# Patient Record
Sex: FEMALE | Race: BLACK OR AFRICAN AMERICAN | Employment: FULL TIME | URBAN - METROPOLITAN AREA
[De-identification: names, ages, dates, MRNs, and addresses within clinical notes are randomized per-mention and may not be internally consistent; named-entity substitution may affect disease eponyms.]

---

## 2017-12-13 ENCOUNTER — HOSPITAL ENCOUNTER (OUTPATIENT)
Dept: PHYSICAL THERAPY | Age: 42
Discharge: HOME | End: 2017-12-31
Admitting: NEUROLOGICAL SURGERY

## 2017-12-14 ENCOUNTER — HOSPITAL ENCOUNTER (OUTPATIENT)
Dept: NEUROLOGY | Age: 42
Discharge: HOME | End: 2017-12-14
Attending: EMERGENCY MEDICINE | Admitting: EMERGENCY MEDICINE

## 2017-12-14 DIAGNOSIS — R20.2 PARESTHESIA OF SKIN: ICD-10-CM

## 2017-12-21 ENCOUNTER — HOSPITAL ENCOUNTER (OUTPATIENT)
Dept: NEUROLOGY | Age: 42
Discharge: HOME | End: 2017-12-21
Attending: EMERGENCY MEDICINE | Admitting: EMERGENCY MEDICINE

## 2017-12-21 DIAGNOSIS — R20.2 PARESTHESIA OF SKIN: ICD-10-CM
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
## 2018-01-01 ENCOUNTER — HOSPITAL ENCOUNTER (OUTPATIENT)
Dept: OTHER | Age: 43
Discharge: HOME | End: 2018-01-01
Attending: NEUROLOGICAL SURGERY | Admitting: NEUROLOGICAL SURGERY

## 2019-09-26 ENCOUNTER — HOSPITAL ENCOUNTER (EMERGENCY)
Age: 44
Discharge: HOME | End: 2019-09-26
Payer: COMMERCIAL

## 2019-09-26 VITALS
SYSTOLIC BLOOD PRESSURE: 173 MMHG | BODY MASS INDEX: 31.56 KG/M2 | OXYGEN SATURATION: 99 % | HEART RATE: 70 BPM | DIASTOLIC BLOOD PRESSURE: 117 MMHG | RESPIRATION RATE: 16 BRPM | HEIGHT: 70 IN | WEIGHT: 220.46 LBS | TEMPERATURE: 97.4 F

## 2019-09-26 DIAGNOSIS — Z02.83 ENCOUNTER FOR DRUG SCREENING: ICD-10-CM

## 2019-09-26 DIAGNOSIS — Z02.83 ENCOUNTER FOR BLOOD-ALCOHOL TEST: Primary | ICD-10-CM

## 2019-09-26 LAB
AMPHETAMINE SCREEN, URINE: NORMAL
BARBITURATE SCREEN URINE: NORMAL
BENZODIAZEPINE SCREEN, URINE: NORMAL
CANNABINOID SCREEN URINE: NORMAL
COCAINE METABOLITE SCREEN URINE: NORMAL
ETHANOL: NORMAL MG/DL (ref 0–0.08)
Lab: NORMAL
METHADONE SCREEN, URINE: NORMAL
OPIATE SCREEN URINE: NORMAL
OXYCODONE URINE: NORMAL
PH UA: 5
PHENCYCLIDINE SCREEN URINE: NORMAL
PROPOXYPHENE SCREEN: NORMAL

## 2019-09-26 PROCEDURE — 80307 DRUG TEST PRSMV CHEM ANLYZR: CPT

## 2019-09-26 PROCEDURE — G0480 DRUG TEST DEF 1-7 CLASSES: HCPCS

## 2019-09-26 PROCEDURE — 99282 EMERGENCY DEPT VISIT SF MDM: CPT

## 2019-09-26 ASSESSMENT — PAIN SCALES - GENERAL: PAINLEVEL_OUTOF10: 0

## 2020-12-27 ENCOUNTER — HOSPITAL ENCOUNTER (EMERGENCY)
Age: 45
Discharge: HOME | DRG: 951 | End: 2020-12-28
Attending: EMERGENCY MEDICINE
Payer: COMMERCIAL

## 2020-12-27 DIAGNOSIS — F10.10 ETOH ABUSE: Primary | ICD-10-CM

## 2020-12-27 PROCEDURE — 99284 EMERGENCY DEPT VISIT MOD MDM: CPT

## 2020-12-27 RX ORDER — BUPRENORPHINE HYDROCHLORIDE AND NALOXONE HYDROCHLORIDE DIHYDRATE 8; 2 MG/1; MG/1
1 TABLET SUBLINGUAL DAILY
Status: ON HOLD | COMMUNITY
Start: 2020-10-12 | End: 2021-01-07 | Stop reason: HOSPADM

## 2020-12-27 RX ORDER — GABAPENTIN 300 MG/1
CAPSULE ORAL
COMMUNITY
Start: 2020-12-16

## 2020-12-27 RX ORDER — CLONIDINE HYDROCHLORIDE 0.1 MG/1
0.1 TABLET ORAL
Status: ON HOLD | COMMUNITY
End: 2020-12-28 | Stop reason: CLARIF

## 2020-12-27 RX ORDER — HYDROCHLOROTHIAZIDE 12.5 MG/1
TABLET ORAL
Status: ON HOLD | COMMUNITY
Start: 2020-09-22 | End: 2021-01-07 | Stop reason: HOSPADM

## 2020-12-27 ASSESSMENT — PAIN DESCRIPTION - FREQUENCY: FREQUENCY: CONTINUOUS

## 2020-12-27 ASSESSMENT — PAIN DESCRIPTION - DESCRIPTORS: DESCRIPTORS: SHARP;SHOOTING;ACHING

## 2020-12-27 ASSESSMENT — PAIN DESCRIPTION - PAIN TYPE: TYPE: ACUTE PAIN

## 2020-12-27 ASSESSMENT — PAIN SCALES - GENERAL: PAINLEVEL_OUTOF10: 7

## 2020-12-27 ASSESSMENT — PAIN - FUNCTIONAL ASSESSMENT: PAIN_FUNCTIONAL_ASSESSMENT: PREVENTS OR INTERFERES SOME ACTIVE ACTIVITIES AND ADLS

## 2020-12-28 ENCOUNTER — APPOINTMENT (OUTPATIENT)
Dept: CT IMAGING | Age: 45
DRG: 951 | End: 2020-12-28
Payer: COMMERCIAL

## 2020-12-28 ENCOUNTER — APPOINTMENT (OUTPATIENT)
Dept: GENERAL RADIOLOGY | Age: 45
DRG: 951 | End: 2020-12-28
Payer: COMMERCIAL

## 2020-12-28 ENCOUNTER — HOSPITAL ENCOUNTER (INPATIENT)
Age: 45
LOS: 10 days | Discharge: HOME | DRG: 951 | End: 2021-01-07
Attending: STUDENT IN AN ORGANIZED HEALTH CARE EDUCATION/TRAINING PROGRAM | Admitting: HOSPITALIST
Payer: COMMERCIAL

## 2020-12-28 VITALS
BODY MASS INDEX: 34.1 KG/M2 | RESPIRATION RATE: 18 BRPM | TEMPERATURE: 98 F | SYSTOLIC BLOOD PRESSURE: 122 MMHG | WEIGHT: 237.66 LBS | DIASTOLIC BLOOD PRESSURE: 71 MMHG | HEART RATE: 71 BPM | OXYGEN SATURATION: 98 %

## 2020-12-28 DIAGNOSIS — M62.82 NON-TRAUMATIC RHABDOMYOLYSIS: ICD-10-CM

## 2020-12-28 DIAGNOSIS — R45.851 SUICIDAL IDEATION: Primary | ICD-10-CM

## 2020-12-28 DIAGNOSIS — K80.20 SYMPTOMATIC CHOLELITHIASIS: ICD-10-CM

## 2020-12-28 DIAGNOSIS — F19.10 DRUG ABUSE (HCC): ICD-10-CM

## 2020-12-28 PROBLEM — F10.939 ALCOHOL WITHDRAWAL SYNDROME WITH COMPLICATION (HCC): Status: ACTIVE | Noted: 2020-12-28

## 2020-12-28 LAB
A/G RATIO: 1.1 (ref 1.1–2.2)
ACETAMINOPHEN LEVEL: <5 UG/ML (ref 10–30)
ALBUMIN SERPL-MCNC: 3.8 G/DL (ref 3.4–5)
ALP BLD-CCNC: 96 U/L (ref 40–129)
ALT SERPL-CCNC: 11 U/L (ref 10–40)
AMPHETAMINE SCREEN, URINE: ABNORMAL
ANION GAP SERPL CALCULATED.3IONS-SCNC: 15 MMOL/L (ref 3–16)
AST SERPL-CCNC: 23 U/L (ref 15–37)
BACTERIA: ABNORMAL /HPF
BARBITURATE SCREEN URINE: ABNORMAL
BASOPHILS ABSOLUTE: 0 K/UL (ref 0–0.2)
BASOPHILS RELATIVE PERCENT: 0.2 %
BENZODIAZEPINE SCREEN, URINE: ABNORMAL
BILIRUB SERPL-MCNC: 0.6 MG/DL (ref 0–1)
BILIRUBIN URINE: NEGATIVE
BLOOD, URINE: ABNORMAL
BUN BLDV-MCNC: 14 MG/DL (ref 7–20)
CALCIUM SERPL-MCNC: 9.1 MG/DL (ref 8.3–10.6)
CANNABINOID SCREEN URINE: ABNORMAL
CHLORIDE BLD-SCNC: 99 MMOL/L (ref 99–110)
CLARITY: CLEAR
CO2: 21 MMOL/L (ref 21–32)
COCAINE METABOLITE SCREEN URINE: POSITIVE
COLOR: YELLOW
CREAT SERPL-MCNC: 1 MG/DL (ref 0.6–1.1)
EOSINOPHILS ABSOLUTE: 0 K/UL (ref 0–0.6)
EOSINOPHILS RELATIVE PERCENT: 0.4 %
EPITHELIAL CELLS, UA: 1 /HPF (ref 0–5)
ETHANOL: 12 MG/DL (ref 0–0.08)
GFR AFRICAN AMERICAN: >60
GFR NON-AFRICAN AMERICAN: 59
GLOBULIN: 3.6 G/DL
GLUCOSE BLD-MCNC: 173 MG/DL (ref 70–99)
GLUCOSE URINE: NEGATIVE MG/DL
HCG(URINE) PREGNANCY TEST: NEGATIVE
HCT VFR BLD CALC: 41.2 % (ref 36–48)
HEMOGLOBIN: 14 G/DL (ref 12–16)
HYALINE CASTS: 0 /LPF (ref 0–8)
KETONES, URINE: NEGATIVE MG/DL
LACTIC ACID: 1.8 MMOL/L (ref 0.4–2)
LEUKOCYTE ESTERASE, URINE: ABNORMAL
LYMPHOCYTES ABSOLUTE: 1.4 K/UL (ref 1–5.1)
LYMPHOCYTES RELATIVE PERCENT: 12.8 %
Lab: ABNORMAL
MAGNESIUM: 1.2 MG/DL (ref 1.8–2.4)
MCH RBC QN AUTO: 31.5 PG (ref 26–34)
MCHC RBC AUTO-ENTMCNC: 33.9 G/DL (ref 31–36)
MCV RBC AUTO: 93.1 FL (ref 80–100)
METHADONE SCREEN, URINE: ABNORMAL
MICROSCOPIC EXAMINATION: YES
MONOCYTES ABSOLUTE: 1.4 K/UL (ref 0–1.3)
MONOCYTES RELATIVE PERCENT: 12.7 %
NEUTROPHILS ABSOLUTE: 8.1 K/UL (ref 1.7–7.7)
NEUTROPHILS RELATIVE PERCENT: 73.9 %
NITRITE, URINE: POSITIVE
OPIATE SCREEN URINE: ABNORMAL
OXYCODONE URINE: ABNORMAL
PDW BLD-RTO: 14.7 % (ref 12.4–15.4)
PH UA: 6
PH UA: 6 (ref 5–8)
PHENCYCLIDINE SCREEN URINE: ABNORMAL
PLATELET # BLD: 132 K/UL (ref 135–450)
PMV BLD AUTO: 9.9 FL (ref 5–10.5)
POTASSIUM REFLEX MAGNESIUM: 3.4 MMOL/L (ref 3.5–5.1)
PROCALCITONIN: 0.25 NG/ML (ref 0–0.15)
PROPOXYPHENE SCREEN: ABNORMAL
PROTEIN UA: 30 MG/DL
RBC # BLD: 4.43 M/UL (ref 4–5.2)
RBC UA: 4 /HPF (ref 0–4)
SALICYLATE, SERUM: <0.3 MG/DL (ref 15–30)
SARS-COV-2, NAAT: NOT DETECTED
SODIUM BLD-SCNC: 135 MMOL/L (ref 136–145)
SPECIFIC GRAVITY UA: >=1.03 (ref 1–1.03)
TOTAL CK: 613 U/L (ref 26–192)
TOTAL CK: 629 U/L (ref 26–192)
TOTAL PROTEIN: 7.4 G/DL (ref 6.4–8.2)
URINE TYPE: ABNORMAL
UROBILINOGEN, URINE: 1 E.U./DL
WBC # BLD: 11 K/UL (ref 4–11)
WBC UA: 343 /HPF (ref 0–5)

## 2020-12-28 PROCEDURE — 2580000003 HC RX 258: Performed by: HOSPITALIST

## 2020-12-28 PROCEDURE — 85025 COMPLETE CBC W/AUTO DIFF WBC: CPT

## 2020-12-28 PROCEDURE — 6370000000 HC RX 637 (ALT 250 FOR IP): Performed by: STUDENT IN AN ORGANIZED HEALTH CARE EDUCATION/TRAINING PROGRAM

## 2020-12-28 PROCEDURE — 99285 EMERGENCY DEPT VISIT HI MDM: CPT

## 2020-12-28 PROCEDURE — 84145 PROCALCITONIN (PCT): CPT

## 2020-12-28 PROCEDURE — 6360000004 HC RX CONTRAST MEDICATION: Performed by: STUDENT IN AN ORGANIZED HEALTH CARE EDUCATION/TRAINING PROGRAM

## 2020-12-28 PROCEDURE — 6360000002 HC RX W HCPCS: Performed by: HOSPITALIST

## 2020-12-28 PROCEDURE — 80307 DRUG TEST PRSMV CHEM ANLYZR: CPT

## 2020-12-28 PROCEDURE — 80053 COMPREHEN METABOLIC PANEL: CPT

## 2020-12-28 PROCEDURE — 96365 THER/PROPH/DIAG IV INF INIT: CPT

## 2020-12-28 PROCEDURE — 1200000000 HC SEMI PRIVATE

## 2020-12-28 PROCEDURE — 84703 CHORIONIC GONADOTROPIN ASSAY: CPT

## 2020-12-28 PROCEDURE — 81001 URINALYSIS AUTO W/SCOPE: CPT

## 2020-12-28 PROCEDURE — 6360000002 HC RX W HCPCS: Performed by: STUDENT IN AN ORGANIZED HEALTH CARE EDUCATION/TRAINING PROGRAM

## 2020-12-28 PROCEDURE — 82550 ASSAY OF CK (CPK): CPT

## 2020-12-28 PROCEDURE — G0480 DRUG TEST DEF 1-7 CLASSES: HCPCS

## 2020-12-28 PROCEDURE — 2580000003 HC RX 258: Performed by: STUDENT IN AN ORGANIZED HEALTH CARE EDUCATION/TRAINING PROGRAM

## 2020-12-28 PROCEDURE — 71045 X-RAY EXAM CHEST 1 VIEW: CPT

## 2020-12-28 PROCEDURE — 6370000000 HC RX 637 (ALT 250 FOR IP): Performed by: HOSPITALIST

## 2020-12-28 PROCEDURE — U0002 COVID-19 LAB TEST NON-CDC: HCPCS

## 2020-12-28 PROCEDURE — 83735 ASSAY OF MAGNESIUM: CPT

## 2020-12-28 PROCEDURE — 74177 CT ABD & PELVIS W/CONTRAST: CPT

## 2020-12-28 PROCEDURE — 83605 ASSAY OF LACTIC ACID: CPT

## 2020-12-28 RX ORDER — LORAZEPAM 1 MG/1
3 TABLET ORAL
Status: DISCONTINUED | OUTPATIENT
Start: 2020-12-28 | End: 2020-12-30

## 2020-12-28 RX ORDER — SPIRONOLACTONE 25 MG/1
50 TABLET ORAL DAILY
Status: DISCONTINUED | OUTPATIENT
Start: 2020-12-28 | End: 2021-01-07 | Stop reason: HOSPADM

## 2020-12-28 RX ORDER — SODIUM CHLORIDE 0.9 % (FLUSH) 0.9 %
10 SYRINGE (ML) INJECTION EVERY 12 HOURS SCHEDULED
Status: DISCONTINUED | OUTPATIENT
Start: 2020-12-28 | End: 2021-01-04 | Stop reason: SDUPTHER

## 2020-12-28 RX ORDER — POLYETHYLENE GLYCOL 3350 17 G/17G
17 POWDER, FOR SOLUTION ORAL DAILY PRN
Status: DISCONTINUED | OUTPATIENT
Start: 2020-12-28 | End: 2021-01-07 | Stop reason: HOSPADM

## 2020-12-28 RX ORDER — SERTRALINE HYDROCHLORIDE 100 MG/1
100 TABLET, FILM COATED ORAL DAILY
Status: DISCONTINUED | OUTPATIENT
Start: 2020-12-28 | End: 2020-12-29

## 2020-12-28 RX ORDER — GAUZE BANDAGE 2" X 2"
100 BANDAGE TOPICAL DAILY
Status: DISCONTINUED | OUTPATIENT
Start: 2020-12-28 | End: 2021-01-07 | Stop reason: HOSPADM

## 2020-12-28 RX ORDER — 0.9 % SODIUM CHLORIDE 0.9 %
1000 INTRAVENOUS SOLUTION INTRAVENOUS ONCE
Status: COMPLETED | OUTPATIENT
Start: 2020-12-28 | End: 2020-12-28

## 2020-12-28 RX ORDER — GABAPENTIN 300 MG/1
300 CAPSULE ORAL 2 TIMES DAILY
Status: DISCONTINUED | OUTPATIENT
Start: 2020-12-28 | End: 2021-01-07 | Stop reason: HOSPADM

## 2020-12-28 RX ORDER — LORAZEPAM 2 MG/ML
4 INJECTION INTRAMUSCULAR
Status: DISCONTINUED | OUTPATIENT
Start: 2020-12-28 | End: 2020-12-30

## 2020-12-28 RX ORDER — LORAZEPAM 2 MG/ML
1 INJECTION INTRAMUSCULAR
Status: DISCONTINUED | OUTPATIENT
Start: 2020-12-28 | End: 2020-12-30

## 2020-12-28 RX ORDER — PANTOPRAZOLE SODIUM 40 MG/1
40 TABLET, DELAYED RELEASE ORAL
Status: DISCONTINUED | OUTPATIENT
Start: 2020-12-29 | End: 2021-01-02

## 2020-12-28 RX ORDER — LORAZEPAM 2 MG/ML
2 INJECTION INTRAMUSCULAR
Status: DISCONTINUED | OUTPATIENT
Start: 2020-12-28 | End: 2020-12-30

## 2020-12-28 RX ORDER — LORAZEPAM 1 MG/1
1 TABLET ORAL
Status: DISCONTINUED | OUTPATIENT
Start: 2020-12-28 | End: 2020-12-30

## 2020-12-28 RX ORDER — ATENOLOL 50 MG/1
100 TABLET ORAL DAILY
Status: DISCONTINUED | OUTPATIENT
Start: 2020-12-28 | End: 2021-01-07 | Stop reason: HOSPADM

## 2020-12-28 RX ORDER — PROMETHAZINE HYDROCHLORIDE 25 MG/1
12.5 TABLET ORAL EVERY 6 HOURS PRN
Status: DISCONTINUED | OUTPATIENT
Start: 2020-12-28 | End: 2021-01-07 | Stop reason: HOSPADM

## 2020-12-28 RX ORDER — LORAZEPAM 1 MG/1
2 TABLET ORAL
Status: DISCONTINUED | OUTPATIENT
Start: 2020-12-28 | End: 2020-12-30

## 2020-12-28 RX ORDER — LORAZEPAM 1 MG/1
4 TABLET ORAL
Status: DISCONTINUED | OUTPATIENT
Start: 2020-12-28 | End: 2020-12-30

## 2020-12-28 RX ORDER — SODIUM CHLORIDE 0.9 % (FLUSH) 0.9 %
10 SYRINGE (ML) INJECTION PRN
Status: DISCONTINUED | OUTPATIENT
Start: 2020-12-28 | End: 2021-01-04 | Stop reason: SDUPTHER

## 2020-12-28 RX ORDER — ACETAMINOPHEN 325 MG/1
650 TABLET ORAL ONCE
Status: COMPLETED | OUTPATIENT
Start: 2020-12-28 | End: 2020-12-28

## 2020-12-28 RX ORDER — MULTIVITAMIN WITH IRON
1 TABLET ORAL DAILY
Status: DISCONTINUED | OUTPATIENT
Start: 2020-12-28 | End: 2021-01-07 | Stop reason: HOSPADM

## 2020-12-28 RX ORDER — FOLIC ACID 1 MG/1
1 TABLET ORAL DAILY
Status: DISCONTINUED | OUTPATIENT
Start: 2020-12-28 | End: 2021-01-07 | Stop reason: HOSPADM

## 2020-12-28 RX ORDER — LORAZEPAM 2 MG/ML
3 INJECTION INTRAMUSCULAR
Status: DISCONTINUED | OUTPATIENT
Start: 2020-12-28 | End: 2020-12-30

## 2020-12-28 RX ORDER — ACETAMINOPHEN 325 MG/1
650 TABLET ORAL EVERY 6 HOURS PRN
Status: DISCONTINUED | OUTPATIENT
Start: 2020-12-28 | End: 2021-01-07 | Stop reason: HOSPADM

## 2020-12-28 RX ORDER — ONDANSETRON 2 MG/ML
4 INJECTION INTRAMUSCULAR; INTRAVENOUS EVERY 6 HOURS PRN
Status: DISCONTINUED | OUTPATIENT
Start: 2020-12-28 | End: 2021-01-07 | Stop reason: HOSPADM

## 2020-12-28 RX ADMIN — SPIRONOLACTONE 50 MG: 25 TABLET ORAL at 14:40

## 2020-12-28 RX ADMIN — ACETAMINOPHEN 650 MG: 325 TABLET ORAL at 08:20

## 2020-12-28 RX ADMIN — SODIUM CHLORIDE, PRESERVATIVE FREE 10 ML: 5 INJECTION INTRAVENOUS at 22:46

## 2020-12-28 RX ADMIN — ACETAMINOPHEN 650 MG: 325 TABLET ORAL at 14:41

## 2020-12-28 RX ADMIN — GABAPENTIN 300 MG: 300 CAPSULE ORAL at 14:41

## 2020-12-28 RX ADMIN — ATENOLOL 100 MG: 50 TABLET ORAL at 14:40

## 2020-12-28 RX ADMIN — THIAMINE HCL TAB 100 MG 100 MG: 100 TAB at 14:41

## 2020-12-28 RX ADMIN — SODIUM CHLORIDE 1000 ML: 9 INJECTION, SOLUTION INTRAVENOUS at 09:00

## 2020-12-28 RX ADMIN — LORAZEPAM 1 MG: 2 INJECTION INTRAMUSCULAR; INTRAVENOUS at 22:44

## 2020-12-28 RX ADMIN — CEFTRIAXONE 1 G: 1 INJECTION, POWDER, FOR SOLUTION INTRAMUSCULAR; INTRAVENOUS at 10:59

## 2020-12-28 RX ADMIN — SODIUM CHLORIDE 1000 ML: 9 INJECTION, SOLUTION INTRAVENOUS at 10:58

## 2020-12-28 RX ADMIN — FOLIC ACID 1 MG: 1 TABLET ORAL at 14:41

## 2020-12-28 RX ADMIN — GABAPENTIN 300 MG: 300 CAPSULE ORAL at 20:29

## 2020-12-28 RX ADMIN — IOPAMIDOL 75 ML: 755 INJECTION, SOLUTION INTRAVENOUS at 10:39

## 2020-12-28 RX ADMIN — ACETAMINOPHEN 650 MG: 325 TABLET ORAL at 20:29

## 2020-12-28 RX ADMIN — LORAZEPAM 4 MG: 2 INJECTION INTRAMUSCULAR; INTRAVENOUS at 14:51

## 2020-12-28 ASSESSMENT — PAIN DESCRIPTION - PAIN TYPE
TYPE: ACUTE PAIN
TYPE: ACUTE PAIN

## 2020-12-28 ASSESSMENT — PAIN SCALES - GENERAL
PAINLEVEL_OUTOF10: 0
PAINLEVEL_OUTOF10: 4
PAINLEVEL_OUTOF10: 0
PAINLEVEL_OUTOF10: 8
PAINLEVEL_OUTOF10: 0

## 2020-12-28 ASSESSMENT — PAIN DESCRIPTION - ONSET: ONSET: GRADUAL

## 2020-12-28 ASSESSMENT — PAIN DESCRIPTION - LOCATION
LOCATION: HEAD

## 2020-12-28 ASSESSMENT — PAIN DESCRIPTION - FREQUENCY: FREQUENCY: CONTINUOUS

## 2020-12-28 ASSESSMENT — PAIN DESCRIPTION - DESCRIPTORS
DESCRIPTORS: ACHING
DESCRIPTORS: ACHING

## 2020-12-28 NOTE — ED NOTES
RN at bedside pt is AOX4 and walking around room with a steady gait. Pt updated on discharge plan of care.       Jack Abdullahi RN  12/28/20 9147

## 2020-12-28 NOTE — ED NOTES
Pt to CT with .       Keyur Danielson  12/28/20 8689 continue on zosyn  f/u cultures  pct >1 cont emp antibx and dc planning

## 2020-12-28 NOTE — ED PROVIDER NOTES
connections     Talks on phone: None     Gets together: None     Attends [employer] service: None     Active member of club or organization: None     Attends meetings of clubs or organizations: None     Relationship status: None    Intimate partner violence     Fear of current or ex partner: None     Emotionally abused: None     Physically abused: None     Forced sexual activity: None   Other Topics Concern    None   Social History Narrative    None        Review of Systems   10 total systems reviewed and found to be negative unless otherwise noted in HPI     Physical Exam   BP (!) 152/93   Pulse 66   Temp 99.2 °F (37.3 °C) (Oral)   Resp 18   Wt 239 lb 6.7 oz (108.6 kg)   SpO2 98%   BMI 34.35 kg/m²      CONSTITUTIONAL: Well appearing, in no acute distress   HEAD: atraumatic, normocephalic   EYES: PERRL, No injection, discharge or scleral icterus. ENT: Moist mucous membranes. NECK: Normal ROM, NO LAD   CARDIOVASCULAR: Regular rate and rhythm. No murmurs or gallop. PULMONARY/CHEST: Airway patent. No retractions. Breath sounds clear with good air entry bilaterally. ABDOMEN: Soft, Non-distended and non-tender, without guarding or rebound. SKIN: Acyanotic, warm, dry   MUSCULOSKELETAL: No swelling, tenderness or deformity   NEUROLOGICAL: Awake and oriented x 3. Pulses intact. Grossly nonfocal   Nursing note and vitals reviewed.      ED Course & Medical Decision Making   Medications   acetaminophen (TYLENOL) tablet 650 mg (650 mg Oral Given 12/29/20 1200)   atenolol (TENORMIN) tablet 100 mg (100 mg Oral Given 12/29/20 0838)   gabapentin (NEURONTIN) capsule 300 mg (300 mg Oral Given 12/29/20 2100)   pantoprazole (PROTONIX) tablet 40 mg (40 mg Oral Given 12/29/20 0512)   spironolactone (ALDACTONE) tablet 50 mg (50 mg Oral Given 12/29/20 0838)   sodium chloride flush 0.9 % injection 10 mL (10 mLs Intravenous Not Given 12/29/20 2114)   sodium chloride flush 0.9 % injection 10 mL (10 mLs Intravenous Given 12/28/20 1148)   iopamidol (ISOVUE-370) 76 % injection 75 mL (75 mLs Intravenous Given 12/28/20 1039)   acetaminophen (TYLENOL) tablet 650 mg (650 mg Oral Given 12/29/20 0114)   0.9 % sodium chloride infusion ( Intravenous New Bag 12/29/20 0113)   magnesium sulfate 4 g in 100 mL IVPB premix (0 g Intravenous Stopped 12/29/20 1844)      Labs Reviewed   CBC WITH AUTO DIFFERENTIAL - Abnormal; Notable for the following components:       Result Value    Platelets 513 (*)     Neutrophils Absolute 8.1 (*)     Monocytes Absolute 1.4 (*)     All other components within normal limits    Narrative:     Performed at:  [address] [facility] [employer]   Phone [phone]   COMPREHENSIVE METABOLIC PANEL W/ REFLEX TO MG FOR LOW K - Abnormal; Notable for the following components:    Sodium 135 (*)     Potassium reflex Magnesium 3.4 (*)     Glucose 173 (*)     GFR Non- 59 (*)     All other components within normal limits    Narrative:     Performed at:  [address] [facility] [employer]   Phone [phone]   [address] - Abnormal; Notable for the following components:    Cocaine Metabolite Screen, Urine POSITIVE (*)     All other components within normal limits    Narrative:     Performed at:  [address] [employer]   Phone [phone]   URINALYSIS - Abnormal; Notable for the following components:    Blood, Urine MODERATE (*)     Protein, UA 30 (*)     Nitrite, Urine POSITIVE (*)     Leukocyte Esterase, Urine SMALL (*)     All other components within normal limits    Narrative:     Performed at:  [address] [employer]   Phone [phone]   ACETAMINOPHEN LEVEL - Abnormal; Notable for the following components:    Acetaminophen Level <5 (*)     All other components components:    Sodium 133 (*)     Potassium reflex Magnesium 3.4 (*)     Glucose 140 (*)     GFR Non-African American 53 (*)     Calcium 7.8 (*)     Total Protein 6.2 (*)     Alb 3.1 (*)     Albumin/Globulin Ratio 1.0 (*)     All other components within normal limits    Narrative:     Performed at:  [facility]  [address] [facility] [employer] [address]   Phone [phone]   CBC WITH AUTO DIFFERENTIAL - Abnormal; Notable for the following components:    Platelets 155 (*)     Lymphocytes Absolute 0.9 (*)     All other components within normal limits    Narrative:     Performed at:  [address] [employer] [address]   Phone [phone]   PROTIME-INR - Abnormal; Notable for the following components:    Protime 14.4 (*)     INR 1.24 (*)     All other components within normal limits    Narrative:     Performed at:  [address] [employer] [address]   Phone [phone]   MAGNESIUM - Abnormal; Notable for the following components:    Magnesium 1.30 (*)     All other components within normal limits    Narrative:     Performed at:  [facility]  [address] [facility] [employer] [address]   Phone [phone]   ETHANOL    Narrative:     Performed at:  [facility]  [address] [place] [address] [employer] [address]   Phone [phone]   PREGNANCY, URINE    Narrative:     Performed at:  [facility]  [address] [place] [address] [employer] [address]   Phone [phone]    Narrative:     Performed at:  [facility]  [address] [place] [address] [employer] [address]   Phone [phone]   LACTIC ACID, PLASMA    Narrative:     Performed at:  [address] [employer] [address]   Phone [phone]   BASIC METABOLIC PANEL   MAGNESIUM      CT ABDOMEN PELVIS W IV CONTRAST Additional Contrast? None   Final Result   1. Acute left pyelonephritis; correlate with urinalysis. XR CHEST PORTABLE   Final Result   No active cardiopulmonary disease            PROCEDURES:   Procedures    ASSESSMENT AND PLAN:  Joseline Haynes is a 54 y.o. female with history of hypertension, gastric ulcers presenting this morning complaining of depression. States that she does not want to give her borders anybody and just wanted to run her car of the zion. On exam patient was very tearful and depressed. On her presentation she was evaluated for psychiatry admission. Labs were obtained including UDS and CK because she has some tremors. So far labs positive for cocaine and elevated CK. UA also showed UTI which patient has been given a dose of Rocephin. The rest of the labs are unremarkable. Given the findings of CK concerning for rhabdo it would be difficult to medically cleared patient for psychiatry admission. She remained suicidal with elevated CK. I am recommending she be admitted and transferred to psych when she is medically cleared. Specialist consulted patient admitted to their service for further evaluation and treatment. ClINICAL IMPRESSION:  1. Suicidal ideation    2. Drug abuse ([address]    3. Non-traumatic rhabdomyolysis        DISPOSITION Admitted 12/28/2020 12:43:37 PM   -Findings and recommendations explained to patient. She expressed understanding and agreed with the plan.   Soco Newberry MD (electronically signed)  12/29/2020  _________________________________________________________________________________________  Amount and/or Complexity of Data Reviewed:  Clinical lab tests: ordered and reviewed   Tests in the radiology section of CPT®: ordered and reviewed   Tests in the medicine section of CPT®: ordered and reviewed   Decide to obtain previous medical records or to obtain history from someone other than the patient: no  Obtain history from someone other than the patient: no  Review and summarize past medical records:yes  I looked up the patient in our electronic medical record:yes  Discuss the patient with other providers:yes  Independent visualization of images, tracings, or specimens:yes  Risk of Complications, Morbidity, and/or Mortality:Moderate  Presenting problems: moderate Diagnostic procedures: moderate yes Management options: moderate     _________________________________________________________________________________________  This record is transcribed utilizing voice recognition technology. There are inherent limitations in this technology. In addition, there may be limitations in editing of this report. If there are any discrepancies, please contact me directly.                Lauryn Narvaez MD  12/29/20 7969

## 2020-12-28 NOTE — ED NOTES
at bedside. Room made safe by removing all hazards. Bottle of alcohol found in pt's coat and given to care-giving RN.      Ayo Danielson  12/28/20 [place]  12/28/20 3994

## 2020-12-28 NOTE — ED NOTES
[facility] to be transported to Room 3254 via stretcher by ED tech. Patient transported with bedside cardiac monitor and with IV medications infusing. IV site clean, dry, and intact. MEWS score and pain assessed as 8/10 and documented. Updated patient on plan of care.  Awaiting call back about sitter     Azul Foote RN  12/28/20 6788

## 2020-12-28 NOTE — ED NOTES
Pt came in to the ER very tearful. She states that she is going to drive into traffic she states that everyone will better without me. She states her last drink of alcohol was last night, her last cocaine use was 2 days ago, and fentanyl was last week. She reports she snorts both. Her states she w3ants both.      When charge nurse RN, walked pt to her room she wrapped the BP cord around her neck       Sanjuanita Erazo RN  12/28/20 2045

## 2020-12-28 NOTE — ED NOTES
Pt had a bottle of alcohol in her coat jacket. Security called .       Anjali Melara RN  12/28/20 6840

## 2020-12-28 NOTE — PROGRESS NOTES
4 Eyes Skin Assessment     NAME:  Idania Betancourt OF BIRTH:  1975  MEDICAL RECORD NUMBER:  [id number]    The patient is being assess for  Admission    I agree that 2 RN's have performed a thorough Head to Toe Skin Assessment on the patient. ALL assessment sites listed below have been assessed. Areas assessed by both nurses:    Head, Face, Ears, Shoulders, Back, Chest, Arms, Elbows, Hands, Sacrum. Buttock, Coccyx, Ischium and Legs. Feet and Heels        Does the Patient have a Wound?  No noted wound(s)       Moe Prevention initiated:  No   Wound Care Orders initiated:  NA    Pressure Injury (Stage 3,4, Unstageable, DTI, NWPT, and Complex wounds) if present place consult order under [de-identified] No    New and Established Ostomies if present place consult order under : NA      Nurse 1 eSignature: Electronically signed by Dino Dumont RN on 12/28/20 at 3:09 PM EST    **SHARE this note so that the co-signing nurse is able to place an eSignature**    Nurse 2 eSignature: Electronically signed by Rhiannon Solomon RN on 12/28/20 at 3:11 PM EST

## 2020-12-28 NOTE — ED NOTES
Care-giving RN finished with triage. PIV placed and blood drawn per RN.       Mike Danielson  12/28/20 5016

## 2020-12-28 NOTE — ED NOTES
RN at bedside pt will awake to name, but is very sleepy. Pt was able to answer triage questions approprietly but kept falling asleep.       Geovany Marinelli RN  12/28/20 3638

## 2020-12-28 NOTE — LETTER
[facility]  [address] [address]  Phone: [phone]             January 7, 2021    Patient: Yasemin Vicente   YOB: 1975   Date of Visit: 12/28/2020       To Whom It May Concern:    Mayda Ward was seen and treated in our facility  beginning 12/28/2020 until 1/7/2021.       Sincerely,       Anderson Mock RN         Signature:__________________________________
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
## 2020-12-28 NOTE — ED NOTES
RN at bedside pt given two warm blankets and updated on plan of care.       Carmen Downey RN  12/28/20 0782
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
## 2020-12-28 NOTE — ED NOTES
Pt states that she started with abd about 30  mins ago rates pain 8/10 RUQ     Devante Fuentes, HITESH  12/28/20 0218

## 2020-12-28 NOTE — ED PROVIDER NOTES
Triage Chief Complaint:   Alcohol Problem (pt states she drinks a bottle of vodka every day and wants help. states she drank a bottle before coming to the ed tonight ) and Abdominal Pain    [place]:  Delma Heard is a 54 y.o. female that presents requesting \"alcohol treatment\". The patient states she is a chronic alcoholic. She states she drank a bottle of vodka before coming to the ED tonight. Despite triage note stating she has abdominal pain she has no abdominal pain at this time. ROS:  At least 12 systems reviewed and otherwise acutely negative except as in the [address] Past Medical History:   Diagnosis Date    Gastric ulcer     Hypertension      Past Surgical History:   Procedure Laterality Date     SECTION      ENDOMETRIAL ABLATION      GASTRIC BYPASS SURGERY       No family history on file.   Social History     Socioeconomic History    Marital status: Single     Spouse name: Not on file    Number of children: Not on file    Years of education: Not on file    Highest education level: Not on file   Occupational History    Not on file   Social Needs    Financial resource strain: Not on file    Food insecurity     Worry: Not on file     Inability: Not on file    Transportation needs     Medical: Not on file     Non-medical: Not on file   Tobacco Use    Smoking status: Current Every Day Smoker     Packs/day: 0.50     Years: 20.00     Pack years: 10.00     Types: Cigarettes   Substance and Sexual Activity    Alcohol use: No     Comment: sober 120 days    Drug use: No    Sexual activity: Not on file   Lifestyle    Physical activity     Days per week: Not on file     Minutes per session: Not on file    Stress: Not on file   Relationships    Social connections     Talks on phone: Not on file     Gets together: Not on file     Attends Ep[employer]opalian service: Not on file     Active member of club or organization: Not on file     Attends meetings of clubs or organizations: Not on file intoxicated but cooperative  HEAD: Normocephalic. Atraumatic. EYES: EOM's grossly intact. Sclera anicteric. ENT: Mucous membranes are moist. Tolerates saliva. No trismus. NECK: Supple. No meningismus. Trachea midline. HEART: RRR. Radial pulses 2+. LUNGS: Respirations unlabored. CTAB  ABDOMEN: Soft. Non-tender. No guarding or rebound. EXTREMITIES: No acute deformities. SKIN: Warm and dry. NEUROLOGICAL: No gross facial drooping. Moves all 4 extremities spontaneously. PSYCHIATRIC: Normal mood. Clinically intoxicated but cooperative    I have reviewed and interpreted all of the currently available lab results from this visit (if applicable):  No results found for this visit on 12/27/20. Radiographs (if obtained):  [] The following radiograph was interpreted by myself in the absence of a radiologist:  [x] Radiologist's Report Reviewed:  n/a    EKG (if obtained): (All EKG's are interpreted by myself in the absence of a cardiologist)  Initial EKG on my interpretation shows n/a    MDM:  Differential diagnosis: Intoxication, chronic alcohol abuse, request for assistance with alcohol detox    I have told the patient that we do not offer inpatient alcohol detox here at [facility].      As the patient is clinically intoxicated I kept her here for observation for an extended period of time, over 7 hours, and she is now clinically sober. Will discharge with outpatient resources to help her address her alcohol abuse issue. I estimate there is LOW risk for SUICIDAL BEHAVIOR, HOMICIDAL BEHAVIOR, PSYCHOSIS, DANGEROUS OR VIOLENT BEHAVIOR, DISORIENTATION, OR MENTAL HEALTH CONDITION REQUIRING HOSPITALIZATION, thus I consider the discharge disposition reasonable. The patient is clinically sober upon discharge. Patient discharged clinically sober with no evidence of intoxication or active withdrawal    Old records reviewed. Labs and imaging reviewed and results discussed with patient. .        Patient was given scripts for the following medications. I counseled patient how to take these medications. New Prescriptions    No medications on file         CRITICAL CARE TIME   Total Critical Care time was 0 minutes, excluding separately reportable procedures. There was a high probability of clinically significant/life threatening deterioration in the patient's condition which required my urgent intervention.       Clinical Impression:  Chronic alcohol abuse  (Please note that portions of this note may have been completed with a voice recognition program. Efforts were made to edit the dictations but occasionally words are mis-transcribed.)    MD Marcela Lea MD  12/28/20 8815

## 2020-12-28 NOTE — H&P
Hospital Medicine History & Physical      PCP: Delia Booth MD    Date of Admission: 2020    Date of Service: Pt seen/examined on 2020 and Admitted to Inpatient with expected LOS greater than two midnights once medically cleared would transition to inpatient psychiatric unit    Chief Complaint: Depression with suicidal thoughts      History Of Present Illness:    54 y.o. female who presented to [facility] complaining of depression stating that she is going to drive herself into traffic according to the notes everyone would be better without her. When the nurse walked her to her room she wrapped the blood pressure cord around her neck. Stated her last drink of alcohol was last night last used cocaine 2 days ago and fentanyl last week. Reportedly snorts both substances and she had a bottle of alcohol in her coat jacket. She requested alcohol treatment acknowledges being a chronic alcoholic and drank a bottle of vodka before coming to the ER. Complained of abdominal pain, has history of gastric ulcer  U tox positive cocaine, acetaminophen and salicylate negative, EtOH 12, LFT normal mild thrombocytopenia platelet 814. CD19 negative UA positive nitrite small LE, 343 WBC consistent with acute urinary tract infection. Beta-hCG negative    Past Medical History:          Diagnosis Date    Gastric ulcer     Hypertension     Suicidal ideation        Past Surgical History:          Procedure Laterality Date     SECTION      ENDOMETRIAL ABLATION      GASTRIC BYPASS SURGERY         Medications Prior to Admission:      Prior to Admission medications    Medication Sig Start Date End Date Taking? Authorizing Provider   buprenorphine-naloxone (SUBOXONE) 8-2 MG SUBL SL tablet 1 tablet daily.   10/12/20   Historical Provider, MD   gabapentin (NEURONTIN) 300 MG capsule  20   Historical Provider, MD   hydroCHLOROthiazide (HYDRODIURIL) 12.5 MG tablet  20   Historical Provider, MD   lansoprazole (PREVACID) 30 MG capsule Take 30 mg by mouth daily    Historical Provider, MD   ibuprofen (ADVIL;MOTRIN) 600 MG tablet Take 1 tablet by mouth every 6 hours as needed for Pain 9/5/16   Hung Yadav MD   acetaminophen (TYLENOL) 325 MG tablet Take 2 tablets by mouth every 6 hours as needed for Pain 8/5/16   Thomas ROSY Morales   sucralfate (CARAFATE) 1 GM/10ML suspension Take 10 mLs by mouth 4 times daily (before meals and nightly) for 10 days 7/3/16 7/13/16  Thomas ROSY Morales   oxyCODONE-acetaminophen (PERCOCET) 5-325 MG per tablet Take 1 tablet by mouth every 8 hours as needed for Pain 7/3/16   Thomas ROSY Carrillo   atenolol (TENORMIN) 100 MG tablet Take 100 mg by mouth daily    Historical Provider, MD   hydrOXYzine (VISTARIL) 25 MG capsule Take 25 mg by mouth 2 times daily    Historical Provider, MD   sertraline (ZOLOFT) 100 MG tablet Take 100 mg by mouth daily    Historical Provider, MD   naltrexone (DEPADE) 50 MG tablet Take 50 mg by mouth daily    Historical Provider, MD   spironolactone (ALDACTONE) 50 MG tablet Take 50 mg by mouth daily    Historical Provider, MD       Allergies:  Ace inhibitors, Ibuprofen, Pcn [penicillins], and Pork (porcine) protein    Social History:      TOBACCO:   reports that she has been smoking cigarettes. She has a 10.00 pack-year smoking history. She has never used smokeless tobacco.  ETOH:   reports current alcohol use. E-Cigarettes/Vaping Use     Questions Responses    E-Cigarette/Vaping Use     Start Date     Passive Exposure     Quit Date     Counseling Given     Comments             Family History:        History reviewed. No pertinent family history. REVIEW OF SYSTEMS:   Pertinent positives as noted in the HPI. Right upper quadrant abdominal pain all other systems reviewed and negative.     PHYSICAL EXAM PERFORMED:    /76   Pulse 70   Temp 100 °F (37.8 °C) (Oral)   Resp 18   Wt 233 lb 4 oz (105.8 kg)   SpO2 97%   BMI 33.47 kg/m² General appearance:  No distress, appears stated age and cooperative. HEENT:  Normal cephalic, atraumatic without obvious deformity. Pupils equal, round, and reactive to light. Extra ocular muscles intact. anicteric  Neck: Supple, full range of motion. No jugular venous distention. Trachea midline. Respiratory:  Normal effort. Clear to auscultation, bilaterally   Cardiovascular:  Regular rate and rhythm with normal S1/S2 without murmurs, rubs or gallops. Abdomen: Soft, non-tender, non-distended with normal bowel sounds. Musculoskeletal:  No clubbing, cyanosis or edema bilaterally. Full range of motion without deformity. Skin: Skin color, texture, turgor normal.  No rashes or lesions. Neurologic:  Cranial nerves: II-XII intact, grossly non-focal.  Psychiatric:  Alert and oriented,  Peripheral Pulses: +2 palpable, equal bilaterally       Labs:     Recent Labs     12/28/20  0803   WBC 11.0   HGB 14.0   HCT 41.2   *     Recent Labs     12/28/20  0803   *   K 3.4*   CL 99   CO2 21   BUN 14   CREATININE 1.0   CALCIUM 9.1     Recent Labs     12/28/20  0803   AST 23   ALT 11   BILITOT 0.6   ALKPHOS 96     No results for input(s): INR in the last 72 hours. Recent Labs     12/28/20  0803   CKTOTAL 629*  613*       Urinalysis:      Lab Results   Component Value Date    NITRU POSITIVE 12/28/2020    WBCUA 343 12/28/2020    BACTERIA 4+ 12/28/2020    RBCUA 4 12/28/2020    BLOODU MODERATE 12/28/2020    SPECGRAV >=1.030 12/28/2020    GLUCOSEU Negative 12/28/2020       Radiology:         CT ABDOMEN PELVIS W IV CONTRAST Additional Contrast? None   Final Result   1. Acute left pyelonephritis; correlate with urinalysis.          XR CHEST PORTABLE   Final Result   No active cardiopulmonary disease             ASSESSMENT:    Active Hospital Problems    Diagnosis Date Noted    Alcohol withdrawal syndrome with complication [facility]) [M43.219] 12/28/2020    Suicidal ideation [R45.851] 12/28/2020   Suicidal ideation  Fever  Acute urinary tract infection  Essential hypertension  Alcohol abuse  Cocaine dependence/abuse  Opioid dependence/abuse    --CIWA protocol  --MVI thiamin e folate po daily  --treat IV rocephin for UTI  --Can convert to po if still needs further tx by dc  --FU urine cx  --1:1 sitter strict (she tried to wrap a cord around her neck in ER)   --when cleared to go to inpt psych   --no hrt dz should not need tele       DVT Prophylaxis: SCD  Diet: DIET GENERAL;  Dietary Nutrition Supplements: Other Oral Supplement (see comment)  Code Status: Full Code    PT/OT Eval Status: independent     Dispo - to inpt psych when medically cleared     75 minutes    Tomy Allen MD    Thank you Mike Funk MD for the opportunity to be involved in this patient's care. If you have any questions or concerns please feel free to contact me at [phone].

## 2020-12-29 PROBLEM — N12 PYELONEPHRITIS: Status: ACTIVE | Noted: 2020-12-29

## 2020-12-29 LAB
A/G RATIO: 1 (ref 1.1–2.2)
ALBUMIN SERPL-MCNC: 3.1 G/DL (ref 3.4–5)
ALP BLD-CCNC: 63 U/L (ref 40–129)
ALT SERPL-CCNC: 10 U/L (ref 10–40)
ANION GAP SERPL CALCULATED.3IONS-SCNC: 9 MMOL/L (ref 3–16)
AST SERPL-CCNC: 17 U/L (ref 15–37)
BASOPHILS ABSOLUTE: 0 K/UL (ref 0–0.2)
BASOPHILS RELATIVE PERCENT: 0.6 %
BILIRUB SERPL-MCNC: 0.6 MG/DL (ref 0–1)
BUN BLDV-MCNC: 15 MG/DL (ref 7–20)
CALCIUM SERPL-MCNC: 7.8 MG/DL (ref 8.3–10.6)
CHLORIDE BLD-SCNC: 100 MMOL/L (ref 99–110)
CO2: 24 MMOL/L (ref 21–32)
CREAT SERPL-MCNC: 1.1 MG/DL (ref 0.6–1.1)
EOSINOPHILS ABSOLUTE: 0 K/UL (ref 0–0.6)
EOSINOPHILS RELATIVE PERCENT: 0.3 %
GFR AFRICAN AMERICAN: >60
GFR NON-AFRICAN AMERICAN: 53
GLOBULIN: 3.1 G/DL
GLUCOSE BLD-MCNC: 140 MG/DL (ref 70–99)
HCT VFR BLD CALC: 39.3 % (ref 36–48)
HEMOGLOBIN: 13 G/DL (ref 12–16)
INR BLD: 1.24 (ref 0.86–1.14)
LYMPHOCYTES ABSOLUTE: 0.9 K/UL (ref 1–5.1)
LYMPHOCYTES RELATIVE PERCENT: 12.8 %
MAGNESIUM: 1.3 MG/DL (ref 1.8–2.4)
MCH RBC QN AUTO: 30.9 PG (ref 26–34)
MCHC RBC AUTO-ENTMCNC: 32.9 G/DL (ref 31–36)
MCV RBC AUTO: 93.8 FL (ref 80–100)
MONOCYTES ABSOLUTE: 0.6 K/UL (ref 0–1.3)
MONOCYTES RELATIVE PERCENT: 9.1 %
NEUTROPHILS ABSOLUTE: 5.4 K/UL (ref 1.7–7.7)
NEUTROPHILS RELATIVE PERCENT: 77.2 %
PDW BLD-RTO: 15 % (ref 12.4–15.4)
PLATELET # BLD: 127 K/UL (ref 135–450)
PMV BLD AUTO: 9.9 FL (ref 5–10.5)
POTASSIUM REFLEX MAGNESIUM: 3.4 MMOL/L (ref 3.5–5.1)
PROTHROMBIN TIME: 14.4 SEC (ref 10–13.2)
RBC # BLD: 4.19 M/UL (ref 4–5.2)
SODIUM BLD-SCNC: 133 MMOL/L (ref 136–145)
TOTAL PROTEIN: 6.2 G/DL (ref 6.4–8.2)
WBC # BLD: 7 K/UL (ref 4–11)

## 2020-12-29 PROCEDURE — 80053 COMPREHEN METABOLIC PANEL: CPT

## 2020-12-29 PROCEDURE — 99222 1ST HOSP IP/OBS MODERATE 55: CPT | Performed by: PSYCHIATRY & NEUROLOGY

## 2020-12-29 PROCEDURE — 85610 PROTHROMBIN TIME: CPT

## 2020-12-29 PROCEDURE — 1200000000 HC SEMI PRIVATE

## 2020-12-29 PROCEDURE — 2580000003 HC RX 258: Performed by: ANESTHESIOLOGY

## 2020-12-29 PROCEDURE — 6360000002 HC RX W HCPCS: Performed by: HOSPITALIST

## 2020-12-29 PROCEDURE — 83735 ASSAY OF MAGNESIUM: CPT

## 2020-12-29 PROCEDURE — 6370000000 HC RX 637 (ALT 250 FOR IP): Performed by: PSYCHIATRY & NEUROLOGY

## 2020-12-29 PROCEDURE — 6370000000 HC RX 637 (ALT 250 FOR IP): Performed by: ANESTHESIOLOGY

## 2020-12-29 PROCEDURE — 6370000000 HC RX 637 (ALT 250 FOR IP): Performed by: HOSPITALIST

## 2020-12-29 PROCEDURE — 2580000003 HC RX 258: Performed by: HOSPITALIST

## 2020-12-29 PROCEDURE — 36415 COLL VENOUS BLD VENIPUNCTURE: CPT

## 2020-12-29 PROCEDURE — 85025 COMPLETE CBC W/AUTO DIFF WBC: CPT

## 2020-12-29 RX ORDER — MAGNESIUM SULFATE 1 G/100ML
1 INJECTION INTRAVENOUS PRN
Status: DISCONTINUED | OUTPATIENT
Start: 2020-12-29 | End: 2021-01-07 | Stop reason: HOSPADM

## 2020-12-29 RX ORDER — SODIUM CHLORIDE 9 MG/ML
INJECTION, SOLUTION INTRAVENOUS CONTINUOUS
Status: DISCONTINUED | OUTPATIENT
Start: 2020-12-29 | End: 2020-12-30

## 2020-12-29 RX ORDER — MIRTAZAPINE 15 MG/1
7.5 TABLET, FILM COATED ORAL NIGHTLY
Status: DISCONTINUED | OUTPATIENT
Start: 2020-12-29 | End: 2021-01-07 | Stop reason: HOSPADM

## 2020-12-29 RX ORDER — ACETAMINOPHEN 325 MG/1
650 TABLET ORAL ONCE
Status: COMPLETED | OUTPATIENT
Start: 2020-12-29 | End: 2020-12-29

## 2020-12-29 RX ORDER — SODIUM CHLORIDE 9 MG/ML
INJECTION, SOLUTION INTRAVENOUS ONCE
Status: COMPLETED | OUTPATIENT
Start: 2020-12-29 | End: 2020-12-29

## 2020-12-29 RX ORDER — SODIUM CHLORIDE 9 MG/ML
INJECTION, SOLUTION INTRAVENOUS ONCE
Status: DISCONTINUED | OUTPATIENT
Start: 2020-12-29 | End: 2020-12-29

## 2020-12-29 RX ORDER — MAGNESIUM SULFATE IN WATER 40 MG/ML
4 INJECTION, SOLUTION INTRAVENOUS ONCE
Status: COMPLETED | OUTPATIENT
Start: 2020-12-29 | End: 2020-12-29

## 2020-12-29 RX ORDER — POTASSIUM CHLORIDE 20 MEQ/1
40 TABLET, EXTENDED RELEASE ORAL PRN
Status: DISCONTINUED | OUTPATIENT
Start: 2020-12-29 | End: 2021-01-07 | Stop reason: HOSPADM

## 2020-12-29 RX ADMIN — LORAZEPAM 2 MG: 2 INJECTION INTRAMUSCULAR; INTRAVENOUS at 08:55

## 2020-12-29 RX ADMIN — THIAMINE HCL TAB 100 MG 100 MG: 100 TAB at 08:38

## 2020-12-29 RX ADMIN — LORAZEPAM 2 MG: 2 INJECTION INTRAMUSCULAR; INTRAVENOUS at 16:11

## 2020-12-29 RX ADMIN — Medication 10 ML: at 18:48

## 2020-12-29 RX ADMIN — Medication 10 ML: at 18:51

## 2020-12-29 RX ADMIN — LORAZEPAM 4 MG: 1 TABLET ORAL at 21:11

## 2020-12-29 RX ADMIN — ATENOLOL 100 MG: 50 TABLET ORAL at 08:38

## 2020-12-29 RX ADMIN — LORAZEPAM 2 MG: 2 INJECTION INTRAMUSCULAR; INTRAVENOUS at 18:50

## 2020-12-29 RX ADMIN — GABAPENTIN 300 MG: 300 CAPSULE ORAL at 08:37

## 2020-12-29 RX ADMIN — SODIUM CHLORIDE: 9 INJECTION, SOLUTION INTRAVENOUS at 01:13

## 2020-12-29 RX ADMIN — MIRTAZAPINE 7.5 MG: 15 TABLET, FILM COATED ORAL at 21:00

## 2020-12-29 RX ADMIN — ACETAMINOPHEN 650 MG: 325 TABLET ORAL at 05:10

## 2020-12-29 RX ADMIN — GABAPENTIN 300 MG: 300 CAPSULE ORAL at 21:00

## 2020-12-29 RX ADMIN — ACETAMINOPHEN 650 MG: 325 TABLET ORAL at 01:14

## 2020-12-29 RX ADMIN — Medication 10 ML: at 16:14

## 2020-12-29 RX ADMIN — Medication 10 ML: at 16:10

## 2020-12-29 RX ADMIN — ACETAMINOPHEN 650 MG: 325 TABLET ORAL at 12:00

## 2020-12-29 RX ADMIN — LORAZEPAM 1 MG: 2 INJECTION INTRAMUSCULAR; INTRAVENOUS at 05:12

## 2020-12-29 RX ADMIN — FOLIC ACID 1 MG: 1 TABLET ORAL at 08:37

## 2020-12-29 RX ADMIN — PANTOPRAZOLE SODIUM 40 MG: 40 TABLET, DELAYED RELEASE ORAL at 05:12

## 2020-12-29 RX ADMIN — SODIUM CHLORIDE: 9 INJECTION, SOLUTION INTRAVENOUS at 13:12

## 2020-12-29 RX ADMIN — POTASSIUM CHLORIDE 40 MEQ: 1500 TABLET, EXTENDED RELEASE ORAL at 14:30

## 2020-12-29 RX ADMIN — SPIRONOLACTONE 50 MG: 25 TABLET ORAL at 08:38

## 2020-12-29 RX ADMIN — CEFTRIAXONE 1 G: 1 INJECTION, POWDER, FOR SOLUTION INTRAMUSCULAR; INTRAVENOUS at 09:02

## 2020-12-29 RX ADMIN — SODIUM CHLORIDE, PRESERVATIVE FREE 10 ML: 5 INJECTION INTRAVENOUS at 09:07

## 2020-12-29 RX ADMIN — MAGNESIUM SULFATE HEPTAHYDRATE 4 G: 40 INJECTION, SOLUTION INTRAVENOUS at 14:37

## 2020-12-29 ASSESSMENT — PAIN DESCRIPTION - LOCATION: LOCATION: ABDOMEN

## 2020-12-29 ASSESSMENT — PAIN SCALES - GENERAL: PAINLEVEL_OUTOF10: 2
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
## 2020-12-29 NOTE — PROGRESS NOTES
Pt awake and c/o 8/10 H/A and CIWA 11. Medicated with Tylenol per prn orders. T 100.0 as well. HL to R FA removed after attempting to flush without success. Ativan to be given per CIWA score once new HL inserted. Attempted x2 into R FA without success. Notified Charge RN and [id number] [address] Supervisor to try. Pt tolerating well. Pt admitted with suicidal thoughts and etoh withdrawal. Suicide precautions in place and sitter in room. Lungs clear. No cough. No sob noted. On RA. On telemetry. Belly round and soft with active BS. Voids without difficulty. No edema. Pt up to BR with A x1.

## 2020-12-29 NOTE — CARE COORDINATION
ANTON rec'd message from bedside RN who states patient voiced desire to go to [facility] upon discharge. ANTON reviewed chart. Per Dr Thomas Stacy Note, she will need inpt psych unit for dc plan. MD ordered psych eval today.   [facility]     Case Management   [phone]    12/29/2020  9:18 AM

## 2020-12-29 NOTE — PROGRESS NOTES
States she should qualify for Ativan, unsure if answering questions to get Ativan, will redo CIWA scale.
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
## 2020-12-29 NOTE — PLAN OF CARE
Problem: Suicide risk  Goal: Provide patient with safe environment  Description: Provide patient with safe environment  Outcome: Ongoing  Note: Has sitter, no attempts, has been pleasant. Problem: Falls - Risk of:  Goal: Will remain free from falls  Description: Will remain free from falls  Outcome: Ongoing  Note: Needs assist to bedside commode, can be sleepy after Ativan.

## 2020-12-29 NOTE — PROGRESS NOTES
Late entry for 12/28 1800: rested in bed this shift. CIWA 25 with 4mg IVP ativan given per scale with positive effects. Sitter at bedside this shift with no attempts made to harm self. Sitter remains at bedside.

## 2020-12-29 NOTE — PROGRESS NOTES
Recheck temp and 100.7 oral. Medicated with Tylenol per order. Pt up to BR to void urine. CMU called and reported 8 beats SVT while up to BR. Pt asymptomatic. 0.9% NS @ 75ml/hr started in L FA HL. Will monitor.

## 2020-12-30 LAB
ANION GAP SERPL CALCULATED.3IONS-SCNC: 11 MMOL/L (ref 3–16)
BUN BLDV-MCNC: 12 MG/DL (ref 7–20)
CALCIUM SERPL-MCNC: 7.9 MG/DL (ref 8.3–10.6)
CHLORIDE BLD-SCNC: 100 MMOL/L (ref 99–110)
CO2: 20 MMOL/L (ref 21–32)
CREAT SERPL-MCNC: 1 MG/DL (ref 0.6–1.1)
GFR AFRICAN AMERICAN: >60
GFR NON-AFRICAN AMERICAN: 59
GLUCOSE BLD-MCNC: 116 MG/DL (ref 70–99)
MAGNESIUM: 1.8 MG/DL (ref 1.8–2.4)
POTASSIUM SERPL-SCNC: 3.8 MMOL/L (ref 3.5–5.1)
SODIUM BLD-SCNC: 131 MMOL/L (ref 136–145)

## 2020-12-30 PROCEDURE — 83735 ASSAY OF MAGNESIUM: CPT

## 2020-12-30 PROCEDURE — 6370000000 HC RX 637 (ALT 250 FOR IP): Performed by: NURSE PRACTITIONER

## 2020-12-30 PROCEDURE — 99231 SBSQ HOSP IP/OBS SF/LOW 25: CPT | Performed by: PSYCHIATRY & NEUROLOGY

## 2020-12-30 PROCEDURE — 80048 BASIC METABOLIC PNL TOTAL CA: CPT

## 2020-12-30 PROCEDURE — 2580000003 HC RX 258: Performed by: HOSPITALIST

## 2020-12-30 PROCEDURE — 1200000000 HC SEMI PRIVATE

## 2020-12-30 PROCEDURE — 36415 COLL VENOUS BLD VENIPUNCTURE: CPT

## 2020-12-30 PROCEDURE — 6360000002 HC RX W HCPCS: Performed by: HOSPITALIST

## 2020-12-30 PROCEDURE — 6370000000 HC RX 637 (ALT 250 FOR IP): Performed by: PSYCHIATRY & NEUROLOGY

## 2020-12-30 PROCEDURE — 6370000000 HC RX 637 (ALT 250 FOR IP): Performed by: HOSPITALIST

## 2020-12-30 RX ORDER — LORAZEPAM 2 MG/ML
1 INJECTION INTRAMUSCULAR EVERY 4 HOURS PRN
Status: DISCONTINUED | OUTPATIENT
Start: 2020-12-30 | End: 2021-01-07 | Stop reason: HOSPADM

## 2020-12-30 RX ORDER — NICOTINE 21 MG/24HR
1 PATCH, TRANSDERMAL 24 HOURS TRANSDERMAL DAILY
Status: DISCONTINUED | OUTPATIENT
Start: 2020-12-30 | End: 2021-01-07 | Stop reason: HOSPADM

## 2020-12-30 RX ORDER — HYDROXYZINE HYDROCHLORIDE 10 MG/1
10 TABLET, FILM COATED ORAL 4 TIMES DAILY PRN
Status: DISCONTINUED | OUTPATIENT
Start: 2020-12-30 | End: 2021-01-07 | Stop reason: HOSPADM

## 2020-12-30 RX ORDER — ESCITALOPRAM OXALATE 10 MG/1
10 TABLET ORAL DAILY
Status: DISCONTINUED | OUTPATIENT
Start: 2020-12-30 | End: 2021-01-06

## 2020-12-30 RX ADMIN — GABAPENTIN 300 MG: 300 CAPSULE ORAL at 09:11

## 2020-12-30 RX ADMIN — MIRTAZAPINE 7.5 MG: 15 TABLET, FILM COATED ORAL at 20:20

## 2020-12-30 RX ADMIN — FOLIC ACID 1 MG: 1 TABLET ORAL at 09:11

## 2020-12-30 RX ADMIN — THIAMINE HCL TAB 100 MG 100 MG: 100 TAB at 09:12

## 2020-12-30 RX ADMIN — THERA TABS 1 TABLET: TAB at 09:11

## 2020-12-30 RX ADMIN — SODIUM CHLORIDE, PRESERVATIVE FREE 10 ML: 5 INJECTION INTRAVENOUS at 20:26

## 2020-12-30 RX ADMIN — LORAZEPAM 2 MG: 1 TABLET ORAL at 05:07

## 2020-12-30 RX ADMIN — ATENOLOL 100 MG: 50 TABLET ORAL at 09:12

## 2020-12-30 RX ADMIN — LORAZEPAM 1 MG: 2 INJECTION INTRAMUSCULAR; INTRAVENOUS at 20:20

## 2020-12-30 RX ADMIN — LORAZEPAM 3 MG: 2 INJECTION INTRAMUSCULAR; INTRAVENOUS at 09:24

## 2020-12-30 RX ADMIN — SPIRONOLACTONE 50 MG: 25 TABLET ORAL at 09:11

## 2020-12-30 RX ADMIN — HYDROXYZINE HYDROCHLORIDE 10 MG: 10 TABLET, FILM COATED ORAL at 16:36

## 2020-12-30 RX ADMIN — SODIUM CHLORIDE, PRESERVATIVE FREE 10 ML: 5 INJECTION INTRAVENOUS at 09:29

## 2020-12-30 RX ADMIN — PANTOPRAZOLE SODIUM 40 MG: 40 TABLET, DELAYED RELEASE ORAL at 05:07

## 2020-12-30 RX ADMIN — ESCITALOPRAM OXALATE 10 MG: 10 TABLET ORAL at 20:20

## 2020-12-30 RX ADMIN — CEFTRIAXONE 1 G: 1 INJECTION, POWDER, FOR SOLUTION INTRAMUSCULAR; INTRAVENOUS at 09:30

## 2020-12-30 RX ADMIN — GABAPENTIN 300 MG: 300 CAPSULE ORAL at 20:20

## 2020-12-30 NOTE — PROGRESS NOTES
States feeling better, but still very concerned about Ativan, explained new orders and new medication.

## 2020-12-30 NOTE — PROGRESS NOTES
Did have shower, asking for pills but unable to tell nurse what she wants, gave Atarax handout, c/o difficulty swallowing but does not have head of bed up.

## 2020-12-30 NOTE — CARE COORDINATION
LSW reviewed chart. Dr Linnea Otero has seen this patient and is recommending Inpt Psych Unit if discharging in near future. If not ready for DC, he recommends inpt Substance [facility]. Pt has told staff she wants [facility] following for DC disposition.   Vi Benedict [facility]     Case Management   [phone]    12/30/2020  10:19 AM

## 2020-12-30 NOTE — PROGRESS NOTES
GFR Non- 59 (A) >60    GFR African American >60 >60    Calcium 9.1 8.3 - 10.6 mg/dL    Total Protein 7.4 6.4 - 8.2 g/dL    Alb 3.8 3.4 - 5.0 g/dL    Albumin/Globulin Ratio 1.1 1.1 - 2.2    Total Bilirubin 0.6 0.0 - 1.0 mg/dL    Alkaline Phosphatase 96 40 - 129 U/L    ALT 11 10 - 40 U/L    AST 23 15 - 37 U/L    Globulin 3.6 g/dL   Ethanol    Collection Time: 12/28/20  8:03 AM   Result Value Ref Range    Ethanol Lvl 12 mg/dL   Acetaminophen (TYLENOL) level    Collection Time: 12/28/20  8:03 AM   Result Value Ref Range    Acetaminophen Level <5 (L) 10 - 30 ug/mL   Salicylate    Collection Time: 12/28/20  8:03 AM   Result Value Ref Range    Salicylate, Serum <8.6 (L) 15.0 - 30.0 mg/dL   COVID-19    Collection Time: 12/28/20  8:03 AM   Result Value Ref Range    SARS-CoV-2, NAAT Not Detected Not Detected   CK    Collection Time: 12/28/20  8:03 AM   Result Value Ref Range    Total  (H) 26 - 192 U/L   Lactic Acid, Plasma    Collection Time: 12/28/20  8:03 AM   Result Value Ref Range    Lactic Acid 1.8 0.4 - 2.0 mmol/L   Procalcitonin    Collection Time: 12/28/20  8:03 AM   Result Value Ref Range    Procalcitonin 0.25 (H) 0.00 - 0.15 ng/mL   Magnesium    Collection Time: 12/28/20  8:03 AM   Result Value Ref Range    Magnesium 1.20 (L) 1.80 - 2.40 mg/dL   CK    Collection Time: 12/28/20  8:03 AM   Result Value Ref Range    Total  (H) 26 - 192 U/L   DRUG SCREEN MULTI URINE    Collection Time: 12/28/20  8:30 AM   Result Value Ref Range    Amphetamine Screen, Urine Neg Negative <1000ng/mL    Barbiturate Screen, Ur Neg Negative <200 ng/mL    Benzodiazepine Screen, Urine Neg Negative <200 ng/mL    Cannabinoid Scrn, Ur Neg Negative <50 ng/mL    Cocaine Metabolite Screen, Urine POSITIVE (A) Negative <300 ng/mL    Opiate Scrn, Ur Neg Negative <300 ng/mL    PCP Screen, Urine Neg Negative <25 ng/mL    Methadone Screen, Urine Neg Negative <300 ng/mL    Propoxyphene Scrn, Ur Neg Negative <300 ng/mL Oxycodone Urine Neg Negative <100 ng/ml    pH, UA 6.0     Drug Screen Comment: see below    Urinalysis    Collection Time: 12/28/20  8:30 AM   Result Value Ref Range    Color, UA Yellow Straw/Yellow    Clarity, UA Clear Clear    Glucose, Ur Negative Negative mg/dL    Bilirubin Urine Negative Negative    Ketones, Urine Negative Negative mg/dL    Specific Gravity, UA >=1.030 1.005 - 1.030    Blood, Urine MODERATE (A) Negative    pH, UA 6.0 5.0 - 8.0    Protein, UA 30 (A) Negative mg/dL    Urobilinogen, Urine 1.0 <2.0 E.U./dL    Nitrite, Urine POSITIVE (A) Negative    Leukocyte Esterase, Urine SMALL (A) Negative    Microscopic Examination YES     Urine Type NotGiven    Pregnancy, urine    Collection Time: 12/28/20  8:30 AM   Result Value Ref Range    HCG(Urine) Pregnancy Test Negative Detects HCG level >20 MIU/mL   Microscopic Urinalysis    Collection Time: 12/28/20  8:30 AM   Result Value Ref Range    Bacteria, UA 4+ (A) None Seen /HPF    Hyaline Casts, UA 0 0 - 8 /LPF    WBC,  (H) 0 - 5 /HPF    RBC, UA 4 0 - 4 /HPF    Epithelial Cells, UA 1 0 - 5 /HPF   Comprehensive Metabolic Panel w/ Reflex to MG    Collection Time: 12/29/20  7:28 AM   Result Value Ref Range    Sodium 133 (L) 136 - 145 mmol/L    Potassium reflex Magnesium 3.4 (L) 3.5 - 5.1 mmol/L    Chloride 100 99 - 110 mmol/L    CO2 24 21 - 32 mmol/L    Anion Gap 9 3 - 16    Glucose 140 (H) 70 - 99 mg/dL    BUN 15 7 - 20 mg/dL    CREATININE 1.1 0.6 - 1.1 mg/dL    GFR Non- 53 (A) >60    GFR African American >60 >60    Calcium 7.8 (L) 8.3 - 10.6 mg/dL    Total Protein 6.2 (L) 6.4 - 8.2 g/dL    Alb 3.1 (L) 3.4 - 5.0 g/dL    Albumin/Globulin Ratio 1.0 (L) 1.1 - 2.2    Total Bilirubin 0.6 0.0 - 1.0 mg/dL    Alkaline Phosphatase 63 40 - 129 U/L    ALT 10 10 - 40 U/L    AST 17 15 - 37 U/L    Globulin 3.1 g/dL   CBC auto differential    Collection Time: 12/29/20  7:28 AM   Result Value Ref Range    WBC 7.0 4.0 - 11.0 K/uL    RBC 4.19 4.00 - 5.20 M/uL    Hemoglobin 13.0 12.0 - 16.0 g/dL    Hematocrit 39.3 36.0 - 48.0 %    MCV 93.8 80.0 - 100.0 fL    MCH 30.9 26.0 - 34.0 pg    MCHC 32.9 31.0 - 36.0 g/dL    RDW 15.0 12.4 - 15.4 %    Platelets 959 (L) 593 - 450 K/uL    MPV 9.9 5.0 - 10.5 fL    Neutrophils % 77.2 %    Lymphocytes % 12.8 %    Monocytes % 9.1 %    Eosinophils % 0.3 %    Basophils % 0.6 %    Neutrophils Absolute 5.4 1.7 - 7.7 K/uL    Lymphocytes Absolute 0.9 (L) 1.0 - 5.1 K/uL    Monocytes Absolute 0.6 0.0 - 1.3 K/uL    Eosinophils Absolute 0.0 0.0 - 0.6 K/uL    Basophils Absolute 0.0 0.0 - 0.2 K/uL   Protime-INR    Collection Time: 12/29/20  7:28 AM   Result Value Ref Range    Protime 14.4 (H) 10.0 - 13.2 sec    INR 1.24 (H) 0.86 - 1.14   Magnesium    Collection Time: 12/29/20  7:28 AM   Result Value Ref Range    Magnesium 1.30 (L) 1.80 - 2.40 mg/dL   Basic Metabolic Panel    Collection Time: 12/30/20  7:43 AM   Result Value Ref Range    Sodium 131 (L) 136 - 145 mmol/L    Potassium 3.8 3.5 - 5.1 mmol/L    Chloride 100 99 - 110 mmol/L    CO2 20 (L) 21 - 32 mmol/L    Anion Gap 11 3 - 16    Glucose 116 (H) 70 - 99 mg/dL    BUN 12 7 - 20 mg/dL    CREATININE 1.0 0.6 - 1.1 mg/dL    GFR Non- 59 (A) >60    GFR African American >60 >60    Calcium 7.9 (L) 8.3 - 10.6 mg/dL   Magnesium    Collection Time: 12/30/20  7:43 AM   Result Value Ref Range    Magnesium 1.80 1.80 - 2.40 mg/dL       Current Facility-Administered Medications   Medication Dose Route Frequency Provider Last Rate Last Admin    LORazepam (ATIVAN) injection 1 mg  1 mg Intravenous Q4H PRN Cece Oviedo MD        hydrOXYzine (ATARAX) tablet 10 mg  10 mg Oral 4x Daily PRN Cece Oviedo MD   10 mg at 12/30/20 1636    cefTRIAXone (ROCEPHIN) 1 g IVPB in 50 mL D5W minibag  1 g Intravenous Daily Cece Oviedo MD   Stopped at 12/30/20 1025    potassium chloride (KLOR-CON M) extended release tablet 40 mEq  40 mEq Oral PRN Cece Oviedo MD   40 mEq at 12/29/20 1430    Or    potassium bicarb-citric acid (EFFER-K) effervescent tablet 40 mEq  40 mEq Oral PRN Aditi Maher MD        magnesium sulfate 1 g in dextrose 5% 100 mL IVPB  1 g Intravenous PRN Aditi Maher MD        mirtazapine (REMERON) tablet 7.5 mg  7.5 mg Oral Nightly Suzann Osler, MD   7.5 mg at 12/29/20 2100    acetaminophen (TYLENOL) tablet 650 mg  650 mg Oral Q6H PRN Aditi Maher MD   650 mg at 12/29/20 1200    atenolol (TENORMIN) tablet 100 mg  100 mg Oral Daily Aditi Maher MD   100 mg at 12/30/20 0912    gabapentin (NEURONTIN) capsule 300 mg  300 mg Oral BID Aditi Maher MD   300 mg at 12/30/20 0911    pantoprazole (PROTONIX) tablet 40 mg  40 mg Oral QAM AC Aditi Maher MD   40 mg at 12/30/20 0507    spironolactone (ALDACTONE) tablet 50 mg  50 mg Oral Daily Aditi Maher MD   50 mg at 12/30/20 0911    sodium chloride flush 0.9 % injection 10 mL  10 mL Intravenous 2 times per day Aditi Maher MD   10 mL at 12/30/20 0929    sodium chloride flush 0.9 % injection 10 mL  10 mL Intravenous PRN Aditi Maher MD   10 mL at 12/29/20 1851    promethazine (PHENERGAN) tablet 12.5 mg  12.5 mg Oral Q6H PRN Aditi Maher MD        Or    ondansetron [facility] injection 4 mg  4 mg Intravenous Q6H PRN Aditi Maher MD        polyethylene glycol [facility]) packet 17 g  17 g Oral Daily PRN Aditi Maher MD        thiamine mononitrate tablet 100 mg  100 mg Oral Daily Aditi Maher MD   100 mg at 12/30/20 3324    multivitamin 1 tablet  1 tablet Oral Daily Aditi Maher MD   1 tablet at 75/20/02 5287    folic acid (FOLVITE) tablet 1 mg  1 mg Oral Daily Aditi Maher MD   1 mg at 12/30/20 0911       ROS:  No tremor, gait is a little tentative. MSE:  A-in gowns, good Ec, pleasant and engageable  A-tearful  M-depressed  S-grossly A + O  I/J-fair/fair  T-linear, goal-directed. Speech c dec tone and amnt. SI at times. No resp to int stim. Recs:  1.   Depression NOS, suicidality-This pt is still pretty depressed and suicidal.  If medically stable anytime soon she'll still require psych admit. At this point pt would like to try lexapro instead of zoloft, so I'll put her on 10. I agree with hydroxyzine. 2.  Drug abuse-This pt desperately needs sobriety. She is apparently rx'd suboxone as an o/p. She would like to be on it again, though I'm unsure of the rules of rxing it in this setting. I'll leave that to the primary team if you feel it's warranted.

## 2020-12-30 NOTE — PLAN OF CARE
Problem: Suicide risk  Goal: Provide patient with safe environment  Description: Provide patient with safe environment  Outcome: Ongoing  Note: Has sitter, monitor safety. Problem: Falls - Risk of:  Goal: Will remain free from falls  Description: Will remain free from falls  Outcome: Ongoing  Note: Has sitter, did walk to bathroom with assist and take shower.

## 2020-12-30 NOTE — PROGRESS NOTES
Hospitalist Progress Note      PCP: Jj Corral MD    Date of Admission: 12/28/2020    Chief Complaint: Depression with suicidal thoughts    Hospital Course:   54 y.o. female presented to [facility] tearful complaining of depression stating that she is going to drive herself into traffic according to the notes everyone would be better without her. When the nurse walked her to her room she wrapped the blood pressure cord around her neck. Stated her last drink of alcohol was last nigh,t last used cocaine 2 days ago and fentanyl last week. Reportedly snorts both substances. she had a bottle of alcohol in her coat jacket. She requested alcohol treatment, acknowledges being a chronic alcoholic, and drank a bottle of vodka before coming to the ER. Complained of abdominal pain, has history of gastric ulcer. U tox positive cocaine. acetaminophen and salicylate negative, EtOH 12, LFT normal mild thrombocytopenia platelet 627. CV19 negative, UA + nitrite, small LE, 343 WBC consistent with acute urinary tract infection. Beta-hCG negative. CT abdomen shows stranding left perinephric or pyelonephritis. Given rocephin IVPB and 2L IV NS bolus in ER. Admitted inpatient status with 1:1 sitter. Treated IV rocephin for pyelonephritis, hydration IV  NS. Placed on CIWA protocol. Seen by psychiatry who recommends inpatient psych placement when medically cleared.        Subjective:  She is asking for the high doses of ativan despite no tremors. Seems less withdrawal related more anxiety self medicating and eye rollback dozes off cant remember events when on this. Discussed wit RN will dc CIWA change to a low dose ativan 1mg IV Q4hr prn anxiety and can try instead to use hydroxyzine low dose  all other systems neg      substance abuse and social history obtained:  Retired [employer]    drinks 1/5 of vodka per day was residing in sober living facility [address] [place] [address],[address] for women. Is otherwise homeless.   Has a chloride flush  10 mL Intravenous 2 times per day    thiamine mononitrate  100 mg Oral Daily    multivitamin  1 tablet Oral Daily    folic acid  1 mg Oral Daily     PRN Meds: LORazepam, potassium chloride **OR** potassium alternative oral replacement, magnesium sulfate, acetaminophen, sodium chloride flush, promethazine **OR** ondansetron, polyethylene glycol      Intake/Output Summary (Last 24 hours) at 12/30/2020 0958  Last data filed at 12/30/2020 0854  Gross per 24 hour   Intake 1075.28 ml   Output --   Net 1075.28 ml       Physical Exam Performed:    /78   Pulse 70   Temp 99.1 °F (37.3 °C) (Axillary)   Resp 16   Wt 239 lb 10.2 oz (108.7 kg)   SpO2 97%   BMI 34.38 kg/m²     General appearance: medicated , HOB elevated  HEENT: Pupils equal, round, and reactive to light. Anicteric sclerae. Her eyes roll up she arouses then dozes back off   Neck: Supple full range of motion. No JVD. Respiratory:  Normal  effort. Clear to auscultation   Cardiovascular:  S1/S2 RRR no m/r/g   Abdomen: Soft, non-tender, non-distended with normal bowel sounds. Musculoskeletal: No clubbing, cyanosis or edema bilaterally. Skin: Skin color, texture, turgor normal.  No rashes or lesions.   Neurologic:   Cranial nerves: II-XII intact, grossly non-focal.no tremors noted   Psychiatric: Alert and oriented, poor insight  Sedated will dc the CIWA ativan    Peripheral Pulses: +2 palpable, equal bilaterally       Labs:   Recent Labs     12/28/20  0803 12/29/20  0728   WBC 11.0 7.0   HGB 14.0 13.0   HCT 41.2 39.3   * 127*     Recent Labs     12/28/20  0803 12/29/20  0728 12/30/20  0743   * 133* 131*   K 3.4* 3.4* 3.8   CL 99 100 100   CO2 21 24 20*   BUN 14 15 12   CREATININE 1.0 1.1 1.0   CALCIUM 9.1 7.8* 7.9*     Recent Labs     12/28/20  0803 12/29/20  0728   AST 23 17   ALT 11 10   BILITOT 0.6 0.6   ALKPHOS 96 63     Recent Labs     12/29/20  0728   INR 1.24*     Recent Labs     12/28/20  0803   CKTOTAL 629* 613*       Urinalysis:      Lab Results   Component Value Date    NITRU POSITIVE 12/28/2020    WBCUA 343 12/28/2020    BACTERIA 4+ 12/28/2020    RBCUA 4 12/28/2020    BLOODU MODERATE 12/28/2020    SPECGRAV >=1.030 12/28/2020    GLUCOSEU Negative 12/28/2020       Radiology:  CT ABDOMEN PELVIS W IV CONTRAST Additional Contrast? None   Final Result   1. Acute left pyelonephritis; correlate with urinalysis. XR CHEST PORTABLE   Final Result   No active cardiopulmonary disease           Assessment/Plan:    Active Hospital Problems    Diagnosis    Pyelonephritis [N12]    Alcohol withdrawal syndrome with complication ([facility] [address] [E70.743]    Suicidal ideation [R45.851]     Suicidal ideation  --1:1sitter-strict  --plan when medically cleared to go to inpatient psych  --she tried to grab a cord in ER and wrap it around her neck   Psychiatrist restarted her zoloft  And added mirtazapine    PTSD    Pyelonephritis left  --UA+, per CT stranding left perinephric  --IV Rocephin  Urine cx wasn't sent on admission from ER clinically she is improving , temps low grade 99.1  Will need 10 day additional course po abx on dc    Cocaine abuse  --utox +, needs substance abuse counseling and support on dc as well  --consult MSW  Would benefit from a drug rehab program inpt as well but will be after her psych hospitalization (she has already attended multipe programs in past)     Alcohol dependence  Acute alcohol withdrawal  -no tremors she is simply self medicating with ativan at this point  --dc CIWA  Ativan 1mg iV Q4hr prn anxiety/tremors  --try hydroxyzine low dose 10mg QID prn    Reassess mid day if stable for clearance to inpt psych   DVT Prophylaxis: SCD  Diet: DIET GENERAL;  Dietary Nutrition Supplements: Other Oral Supplement (see comment)  Code Status: Full Code    PT/OT Eval Status: independent    Dispo - TBD .  MSW for referrals to inpt psych placement   40 minutes time     Jakob Tripp MD

## 2020-12-30 NOTE — PROGRESS NOTES
Pt noted to be agitated earlier in shift. CIWA score of 23 noted. 4mg of ativan given PO. IV medication attempted however lost IV access during flushing of IV. Pt currently resting in bed with sitter at bedside r/t to suicidal ideation. Pt monitored frequently.

## 2020-12-30 NOTE — PROGRESS NOTES
Very agitated, wants to smoke, did stand up and PCA reports she lost balance and PCA did assist, patient on phone, per PCA patient talking about leaving AMA, nurse trying to offer medication but will not answer.

## 2020-12-30 NOTE — PROGRESS NOTES
Friend here now, patient earlier stated wanted to leave AMA, wants to smoke, Dr. Zafar Treviño aware. Patient being supervised by nurse.

## 2020-12-31 PROCEDURE — 2580000003 HC RX 258: Performed by: HOSPITALIST

## 2020-12-31 PROCEDURE — 6370000000 HC RX 637 (ALT 250 FOR IP): Performed by: HOSPITALIST

## 2020-12-31 PROCEDURE — 6370000000 HC RX 637 (ALT 250 FOR IP): Performed by: NURSE PRACTITIONER

## 2020-12-31 PROCEDURE — 6360000002 HC RX W HCPCS: Performed by: FAMILY MEDICINE

## 2020-12-31 PROCEDURE — 87086 URINE CULTURE/COLONY COUNT: CPT

## 2020-12-31 PROCEDURE — 6370000000 HC RX 637 (ALT 250 FOR IP): Performed by: PSYCHIATRY & NEUROLOGY

## 2020-12-31 PROCEDURE — 6360000002 HC RX W HCPCS: Performed by: HOSPITALIST

## 2020-12-31 PROCEDURE — 1200000000 HC SEMI PRIVATE

## 2020-12-31 RX ORDER — LEVOFLOXACIN 5 MG/ML
750 INJECTION, SOLUTION INTRAVENOUS EVERY 24 HOURS
Status: DISCONTINUED | OUTPATIENT
Start: 2020-12-31 | End: 2021-01-03

## 2020-12-31 RX ADMIN — ATENOLOL 100 MG: 50 TABLET ORAL at 08:08

## 2020-12-31 RX ADMIN — PANTOPRAZOLE SODIUM 40 MG: 40 TABLET, DELAYED RELEASE ORAL at 05:24

## 2020-12-31 RX ADMIN — ACETAMINOPHEN 650 MG: 325 TABLET ORAL at 14:57

## 2020-12-31 RX ADMIN — HYDROXYZINE HYDROCHLORIDE 10 MG: 10 TABLET, FILM COATED ORAL at 05:24

## 2020-12-31 RX ADMIN — CEFTRIAXONE 1 G: 1 INJECTION, POWDER, FOR SOLUTION INTRAMUSCULAR; INTRAVENOUS at 08:08

## 2020-12-31 RX ADMIN — LEVOFLOXACIN 750 MG: 5 INJECTION, SOLUTION INTRAVENOUS at 16:34

## 2020-12-31 RX ADMIN — ESCITALOPRAM OXALATE 10 MG: 10 TABLET ORAL at 08:08

## 2020-12-31 RX ADMIN — LORAZEPAM 1 MG: 2 INJECTION INTRAMUSCULAR; INTRAVENOUS at 08:07

## 2020-12-31 RX ADMIN — LORAZEPAM 1 MG: 2 INJECTION INTRAMUSCULAR; INTRAVENOUS at 20:48

## 2020-12-31 RX ADMIN — GABAPENTIN 300 MG: 300 CAPSULE ORAL at 20:48

## 2020-12-31 RX ADMIN — THIAMINE HCL TAB 100 MG 100 MG: 100 TAB at 08:08

## 2020-12-31 RX ADMIN — GABAPENTIN 300 MG: 300 CAPSULE ORAL at 08:08

## 2020-12-31 RX ADMIN — FOLIC ACID 1 MG: 1 TABLET ORAL at 08:08

## 2020-12-31 RX ADMIN — SODIUM CHLORIDE, PRESERVATIVE FREE 10 ML: 5 INJECTION INTRAVENOUS at 20:48

## 2020-12-31 RX ADMIN — THERA TABS 1 TABLET: TAB at 08:08

## 2020-12-31 RX ADMIN — SODIUM CHLORIDE, PRESERVATIVE FREE 10 ML: 5 INJECTION INTRAVENOUS at 08:45

## 2020-12-31 RX ADMIN — SPIRONOLACTONE 50 MG: 25 TABLET ORAL at 08:08

## 2020-12-31 RX ADMIN — ACETAMINOPHEN 650 MG: 325 TABLET ORAL at 05:24

## 2020-12-31 RX ADMIN — ACETAMINOPHEN 650 MG: 325 TABLET ORAL at 20:48

## 2020-12-31 RX ADMIN — LORAZEPAM 1 MG: 2 INJECTION INTRAMUSCULAR; INTRAVENOUS at 14:58

## 2020-12-31 RX ADMIN — MIRTAZAPINE 7.5 MG: 15 TABLET, FILM COATED ORAL at 20:48

## 2020-12-31 ASSESSMENT — PAIN DESCRIPTION - LOCATION: LOCATION: HEAD

## 2020-12-31 ASSESSMENT — PAIN SCALES - GENERAL
PAINLEVEL_OUTOF10: 4
PAINLEVEL_OUTOF10: 0
PAINLEVEL_OUTOF10: 1

## 2020-12-31 ASSESSMENT — PAIN DESCRIPTION - ONSET
ONSET: ON-GOING
ONSET: ON-GOING

## 2020-12-31 ASSESSMENT — PAIN DESCRIPTION - FREQUENCY
FREQUENCY: INTERMITTENT
FREQUENCY: INTERMITTENT

## 2020-12-31 ASSESSMENT — PAIN DESCRIPTION - PAIN TYPE: TYPE: ACUTE PAIN

## 2020-12-31 ASSESSMENT — PAIN - FUNCTIONAL ASSESSMENT: PAIN_FUNCTIONAL_ASSESSMENT: PREVENTS OR INTERFERES SOME ACTIVE ACTIVITIES AND ADLS

## 2020-12-31 NOTE — PROGRESS NOTES
Pt wanting to go outside and smoke. Getting agitated that she cant leave for a \"smoke break\".   Perfect serve message sent requesting nicotine patch per pt request.

## 2020-12-31 NOTE — PROGRESS NOTES
Patient lying in bed sleeping, RR 14/min. NO pain or discomfort noted, sitter at bedside. Will cont to monitor.

## 2020-12-31 NOTE — CARE COORDINATION
PHILLIPW reviewed chart. PHILLIPW spoke with Dr Placido León who reports she had a fever this morning and is not ready for transfer. She does anticipate Psych Inpt at discharge. She continues with sitter due to suicide ideations.   Latonia Garcia [place]     Case Management   [phone]    12/31/2020  4:47 PM

## 2020-12-31 NOTE — PROGRESS NOTES
Order received for nicotine patch. Place to left upper arm. Pt thankful. Pt becoming less anxious. Will continue to monitor.

## 2020-12-31 NOTE — PROGRESS NOTES
Hospitalist Progress Note      PCP: Jeannette Betancourt MD    Date of Admission: 12/28/2020      Hospital Course:   54 y.o. female presented to [facility] complaining of depression stating that she is going to drive herself into traffic according to the notes everyone would be better without her.  When the nurse walked her to her room she wrapped the blood pressure cord around her neck.  Stated her last drink of alcohol was last nigh,t last used cocaine 2 days ago and fentanyl last week.  Reportedly snorts both substances. she had a bottle of alcohol in her coat jacket. She requested alcohol treatment, acknowledges being a chronic alcoholic, and drank a bottle of vodka before coming to the ER.  Complained of abdominal pain, has history of gastric ulcer. U tox positive cocaine. acetaminophen and salicylate negative, EtOH 12, LFT normal mild thrombocytopenia platelet 123.  CV52 negative, UA + nitrite, small LE, 343 WBC consistent with acute urinary tract infection.  Beta-hCG negative. CT abdomen shows stranding left perinephric or pyelonephritis. Given rocephin IVPB and 2L IV NS bolus in ER.       Admitted inpatient status with 1:1 sitter. Treated IV rocephin for pyelonephritis, hydration IV  NS. Placed on CIWA protocol. Seen by psychiatry who recommends inpatient psych placement when medically cleared. 12/31 - low grade fever today, abx switched to Levaquin    Subjective:    Patient seen and examined. Complains of dysuria and low back pain worse on left. No n/v. Also complains of RUQ pain.      Medications:  Reviewed    Infusion Medications   Scheduled Medications    levofloxacin  750 mg Intravenous Q24H    escitalopram  10 mg Oral Daily    nicotine  1 patch Transdermal Daily    mirtazapine  7.5 mg Oral Nightly    atenolol  100 mg Oral Daily    gabapentin  300 mg Oral BID    pantoprazole  40 mg Oral QAM AC    spironolactone  50 mg Oral Daily    sodium chloride flush  10 mL Intravenous 2 times per day    thiamine mononitrate  100 mg Oral Daily    multivitamin  1 tablet Oral Daily    folic acid  1 mg Oral Daily     PRN Meds: LORazepam, hydrOXYzine, potassium chloride **OR** potassium alternative oral replacement, magnesium sulfate, acetaminophen, sodium chloride flush, promethazine **OR** ondansetron, polyethylene glycol      Intake/Output Summary (Last 24 hours) at 12/31/2020 1827  Last data filed at 12/31/2020 0800  Gross per 24 hour   Intake 670 ml   Output --   Net 670 ml       Physical Exam Performed:    /80   Pulse 53   Temp 97.5 °F (36.4 °C) (Oral)   Resp 16   Ht 5' 10\" (1.778 m)   Wt 247 lb 9.2 oz (112.3 kg)   SpO2 98%   BMI 35.52 kg/m²       General appearance: No apparent distress, alert and cooperative. HEENT: Conjunctivae/corneas clear, neck supple w/ full ROM  Respiratory:  Normal respiratory effort. Clear to auscultation, bilaterally without Rales/Wheezes/Rhonchi. Cardiovascular: Regular rate and rhythm, normal S1/S2, no murmurs  Abdomen: Soft, RUQ ttp, non-distended with normal bowel sounds. Musculoskeletal: No edema bilaterally, mild low back bilateral ttp  Neurologic:  No new focal deficits  Psychiatric: Alert and oriented, normal insight  Capillary Refill: Brisk,< 3 seconds   Peripheral Pulses: +2 palpable, equal bilaterally       Labs:   Recent Labs     12/29/20  0728   WBC 7.0   HGB 13.0   HCT 39.3   *     Recent Labs     12/29/20  0728 12/30/20  0743   * 131*   K 3.4* 3.8    100   CO2 24 20*   BUN 15 12   CREATININE 1.1 1.0   CALCIUM 7.8* 7.9*     Recent Labs     12/29/20  0728   AST 17   ALT 10   BILITOT 0.6   ALKPHOS 63     Recent Labs     12/29/20  0728   INR 1.24*     No results for input(s): [place] Mcelroy in the last 72 hours.     Urinalysis:      Lab Results   Component Value Date    NITRU POSITIVE 12/28/2020    WBCUA 343 12/28/2020    BACTERIA 4+ 12/28/2020    RBCUA 4 12/28/2020    BLOODU MODERATE 12/28/2020    SPECGRAV >=1.030 12/28/2020    GLUCOSEU Negative 12/28/2020       Radiology:  CT ABDOMEN PELVIS W IV CONTRAST Additional Contrast? None   Final Result   1. Acute left pyelonephritis; correlate with urinalysis.          XR CHEST PORTABLE   Final Result   No active cardiopulmonary disease                 Assessment/Plan:    Active Hospital Problems    Diagnosis    Pyelonephritis [N12]    Alcohol withdrawal syndrome with complication ([facility] [address] [Y91.792]    Suicidal ideation [R45.851]     Left Pyelonephritis  - febrile this morning  - will switch rocephin to levaquin  - urine culture sent    LUQ pain  - repeat lfts   - US ordered    Suicidal ideation  - 1:1 sitter  - IP psych when medically stable for discharge    PTSD  Cocaine abuse  - would benefit from drug rehab program  - previously on subutex, recommend OP f/u    Alcohol dependence  - no in withdrawal  - ativn prn for anxiety      DVT Prophylaxis: scd  Diet: DIET GENERAL;  Dietary Nutrition Supplements: Other Oral Supplement (see comment)  Code Status: Full Code      Dispo - continue care, IP psych on discharge    Kenzie Miles MD

## 2021-01-01 LAB
A/G RATIO: 0.9 (ref 1.1–2.2)
ALBUMIN SERPL-MCNC: 3 G/DL (ref 3.4–5)
ALP BLD-CCNC: 64 U/L (ref 40–129)
ALT SERPL-CCNC: 9 U/L (ref 10–40)
ANION GAP SERPL CALCULATED.3IONS-SCNC: 12 MMOL/L (ref 3–16)
AST SERPL-CCNC: 16 U/L (ref 15–37)
BASOPHILS ABSOLUTE: 0 K/UL (ref 0–0.2)
BASOPHILS RELATIVE PERCENT: 0.7 %
BILIRUB SERPL-MCNC: 0.3 MG/DL (ref 0–1)
BUN BLDV-MCNC: 12 MG/DL (ref 7–20)
CALCIUM SERPL-MCNC: 7.9 MG/DL (ref 8.3–10.6)
CHLORIDE BLD-SCNC: 102 MMOL/L (ref 99–110)
CO2: 20 MMOL/L (ref 21–32)
CREAT SERPL-MCNC: 0.7 MG/DL (ref 0.6–1.1)
EOSINOPHILS ABSOLUTE: 0.2 K/UL (ref 0–0.6)
EOSINOPHILS RELATIVE PERCENT: 3.5 %
GFR AFRICAN AMERICAN: >60
GFR NON-AFRICAN AMERICAN: >60
GLOBULIN: 3.3 G/DL
GLUCOSE BLD-MCNC: 90 MG/DL (ref 70–99)
HCT VFR BLD CALC: 39.4 % (ref 36–48)
HEMOGLOBIN: 13 G/DL (ref 12–16)
LYMPHOCYTES ABSOLUTE: 2.3 K/UL (ref 1–5.1)
LYMPHOCYTES RELATIVE PERCENT: 35.7 %
MCH RBC QN AUTO: 30.8 PG (ref 26–34)
MCHC RBC AUTO-ENTMCNC: 32.9 G/DL (ref 31–36)
MCV RBC AUTO: 93.6 FL (ref 80–100)
MONOCYTES ABSOLUTE: 1.1 K/UL (ref 0–1.3)
MONOCYTES RELATIVE PERCENT: 18.1 %
NEUTROPHILS ABSOLUTE: 2.7 K/UL (ref 1.7–7.7)
NEUTROPHILS RELATIVE PERCENT: 42 %
ORGANISM: ABNORMAL
PDW BLD-RTO: 14.9 % (ref 12.4–15.4)
PLATELET # BLD: 157 K/UL (ref 135–450)
PMV BLD AUTO: 10 FL (ref 5–10.5)
POTASSIUM SERPL-SCNC: 3.8 MMOL/L (ref 3.5–5.1)
RBC # BLD: 4.21 M/UL (ref 4–5.2)
SODIUM BLD-SCNC: 134 MMOL/L (ref 136–145)
TOTAL PROTEIN: 6.3 G/DL (ref 6.4–8.2)
URINE CULTURE, ROUTINE: ABNORMAL
WBC # BLD: 6.3 K/UL (ref 4–11)

## 2021-01-01 PROCEDURE — 6360000002 HC RX W HCPCS: Performed by: FAMILY MEDICINE

## 2021-01-01 PROCEDURE — 99231 SBSQ HOSP IP/OBS SF/LOW 25: CPT | Performed by: PSYCHIATRY & NEUROLOGY

## 2021-01-01 PROCEDURE — 80053 COMPREHEN METABOLIC PANEL: CPT

## 2021-01-01 PROCEDURE — 6370000000 HC RX 637 (ALT 250 FOR IP): Performed by: PSYCHIATRY & NEUROLOGY

## 2021-01-01 PROCEDURE — 85025 COMPLETE CBC W/AUTO DIFF WBC: CPT

## 2021-01-01 PROCEDURE — 6370000000 HC RX 637 (ALT 250 FOR IP): Performed by: NURSE PRACTITIONER

## 2021-01-01 PROCEDURE — 1200000000 HC SEMI PRIVATE

## 2021-01-01 PROCEDURE — 36415 COLL VENOUS BLD VENIPUNCTURE: CPT

## 2021-01-01 PROCEDURE — 2580000003 HC RX 258: Performed by: HOSPITALIST

## 2021-01-01 PROCEDURE — 6370000000 HC RX 637 (ALT 250 FOR IP): Performed by: HOSPITALIST

## 2021-01-01 PROCEDURE — 6360000002 HC RX W HCPCS: Performed by: HOSPITALIST

## 2021-01-01 RX ADMIN — ESCITALOPRAM OXALATE 10 MG: 10 TABLET ORAL at 11:35

## 2021-01-01 RX ADMIN — FOLIC ACID 1 MG: 1 TABLET ORAL at 11:35

## 2021-01-01 RX ADMIN — THIAMINE HCL TAB 100 MG 100 MG: 100 TAB at 11:35

## 2021-01-01 RX ADMIN — ACETAMINOPHEN 650 MG: 325 TABLET ORAL at 19:48

## 2021-01-01 RX ADMIN — MIRTAZAPINE 7.5 MG: 15 TABLET, FILM COATED ORAL at 19:48

## 2021-01-01 RX ADMIN — LORAZEPAM 1 MG: 2 INJECTION INTRAMUSCULAR; INTRAVENOUS at 19:48

## 2021-01-01 RX ADMIN — ATENOLOL 100 MG: 50 TABLET ORAL at 11:34

## 2021-01-01 RX ADMIN — GABAPENTIN 300 MG: 300 CAPSULE ORAL at 19:48

## 2021-01-01 RX ADMIN — LORAZEPAM 1 MG: 2 INJECTION INTRAMUSCULAR; INTRAVENOUS at 13:01

## 2021-01-01 RX ADMIN — ACETAMINOPHEN 650 MG: 325 TABLET ORAL at 05:33

## 2021-01-01 RX ADMIN — LEVOFLOXACIN 750 MG: 5 INJECTION, SOLUTION INTRAVENOUS at 15:51

## 2021-01-01 RX ADMIN — LORAZEPAM 1 MG: 2 INJECTION INTRAMUSCULAR; INTRAVENOUS at 05:33

## 2021-01-01 RX ADMIN — SODIUM CHLORIDE, PRESERVATIVE FREE 10 ML: 5 INJECTION INTRAVENOUS at 11:46

## 2021-01-01 RX ADMIN — PANTOPRAZOLE SODIUM 40 MG: 40 TABLET, DELAYED RELEASE ORAL at 05:33

## 2021-01-01 RX ADMIN — GABAPENTIN 300 MG: 300 CAPSULE ORAL at 11:35

## 2021-01-01 RX ADMIN — SPIRONOLACTONE 50 MG: 25 TABLET ORAL at 11:34

## 2021-01-01 RX ADMIN — Medication 10 ML: at 13:00

## 2021-01-01 RX ADMIN — HYDROXYZINE HYDROCHLORIDE 10 MG: 10 TABLET, FILM COATED ORAL at 11:43

## 2021-01-01 RX ADMIN — SODIUM CHLORIDE, PRESERVATIVE FREE 10 ML: 5 INJECTION INTRAVENOUS at 19:48

## 2021-01-01 ASSESSMENT — PAIN - FUNCTIONAL ASSESSMENT: PAIN_FUNCTIONAL_ASSESSMENT: ACTIVITIES ARE NOT PREVENTED

## 2021-01-01 ASSESSMENT — PAIN DESCRIPTION - ONSET: ONSET: GRADUAL

## 2021-01-01 ASSESSMENT — PAIN DESCRIPTION - LOCATION: LOCATION: HEAD

## 2021-01-01 ASSESSMENT — PAIN DESCRIPTION - PROGRESSION: CLINICAL_PROGRESSION: GRADUALLY IMPROVING

## 2021-01-01 ASSESSMENT — PAIN SCALES - GENERAL
PAINLEVEL_OUTOF10: 0
PAINLEVEL_OUTOF10: 8

## 2021-01-01 NOTE — PROGRESS NOTES
Dr. Connie Law here, no longer needs suicide precautions, alek zamorano, reviewed with patient safety concerns at to call staff for needs.

## 2021-01-01 NOTE — PROGRESS NOTES
Pt requesting assistance for bathroom. SBA provided to bathroom. Continent of urine. IV to LFA patent. IV ATB ordered. Pt educated regarding NPO status at midnight for US tomorrow. Pt verbalizes understanding. Sitter no longer required. Call light within in reach. Pt educated to use. Will monitor.

## 2021-01-01 NOTE — PLAN OF CARE
Problem: Suicide risk  Goal: Provide patient with safe environment  Description: Provide patient with safe environment  Outcome: Completed     Problem: Falls - Risk of:  Goal: Will remain free from falls  Description: Will remain free from falls  Outcome: Ongoing  Note: No longer has sitter, reviewed safety concerns to call for staff.

## 2021-01-01 NOTE — PROGRESS NOTES
Hospitalist Progress Note      PCP: Mt Ratliff MD    Date of Admission: 12/28/2020      Hospital Course:   54 y.o. female presented to [facility] complaining of depression stating that she is going to drive herself into traffic according to the notes everyone would be better without her.  When the nurse walked her to her room she wrapped the blood pressure cord around her neck.  Stated her last drink of alcohol was last nigh,t last used cocaine 2 days ago and fentanyl last week.  Reportedly snorts both substances. she had a bottle of alcohol in her coat jacket. She requested alcohol treatment, acknowledges being a chronic alcoholic, and drank a bottle of vodka before coming to the ER.  Complained of abdominal pain, has history of gastric ulcer. U tox positive cocaine. acetaminophen and salicylate negative, EtOH 12, LFT normal mild thrombocytopenia platelet 099.  JR10 negative, UA + nitrite, small LE, 343 WBC consistent with acute urinary tract infection.  Beta-hCG negative. CT abdomen shows stranding left perinephric or pyelonephritis. Given rocephin IVPB and 2L IV NS bolus in ER.       Admitted inpatient status with 1:1 sitter. Treated IV rocephin for pyelonephritis, hydration IV  NS. Placed on CIWA protocol. Seen by psychiatry who recommends inpatient psych placement when medically cleared. 12/31 - low grade fever today, abx switched to [address]    12/1 - having right sided abdominal pain, intermittent, 8/10, with food making the pain better    Subjective:    Patient seen and examined. Complains of right sided abdominal pain. Better with eating. Still some lower left back pain. No fevers overnight.     Medications:  Reviewed    Infusion Medications   Scheduled Medications    levofloxacin  750 mg Intravenous Q24H    escitalopram  10 mg Oral Daily    nicotine  1 patch Transdermal Daily    mirtazapine  7.5 mg Oral Nightly    atenolol  100 mg Oral Daily    gabapentin  300 mg Oral BID    pantoprazole  40 mg Oral QAM AC    spironolactone  50 mg Oral Daily    sodium chloride flush  10 mL Intravenous 2 times per day    thiamine mononitrate  100 mg Oral Daily    multivitamin  1 tablet Oral Daily    folic acid  1 mg Oral Daily     PRN Meds: LORazepam, hydrOXYzine, potassium chloride **OR** potassium alternative oral replacement, magnesium sulfate, acetaminophen, sodium chloride flush, promethazine **OR** ondansetron, polyethylene glycol    No intake or output data in the 24 hours ending 01/01/21 0955    Physical Exam Performed:    BP (!) 147/96   Pulse 70   Temp 97.3 °F (36.3 °C) (Oral)   Resp 17   Ht 5' 10\" (1.778 m)   Wt 248 lb 0.3 oz (112.5 kg)   SpO2 98%   BMI 35.59 kg/m²       General appearance: No apparent distress, alert and cooperative. HEENT: Conjunctivae/corneas clear, neck supple w/ full ROM  Respiratory:  Normal respiratory effort. Clear to auscultation, bilaterally without Rales/Wheezes/Rhonchi. Cardiovascular: Regular rate and rhythm, normal S1/S2, no murmurs  Abdomen: Soft, RUQ ttp, negative Powers's sign, non-distended with normal bowel sounds. Musculoskeletal: No edema bilaterally, mild low back bilateral ttp  Neurologic:  No new focal deficits  Psychiatric: Alert and oriented, normal insight  Capillary Refill: Brisk,< 3 seconds   Peripheral Pulses: +2 palpable, equal bilaterally       Labs:   Recent Labs     01/01/21  0814   WBC 6.3   HGB 13.0   HCT 39.4        Recent Labs     12/30/20  0743 01/01/21  0814   * 134*   K 3.8 3.8    102   CO2 20* 20*   BUN 12 12   CREATININE 1.0 0.7   CALCIUM 7.9* 7.9*     Recent Labs     01/01/21  0814   AST 16   ALT 9*   BILITOT 0.3   ALKPHOS 64     No results for input(s): INR in the last 72 hours. No results for input(s): Neldon Docker in the last 72 hours.     Urinalysis:      Lab Results   Component Value Date    NITRU POSITIVE 12/28/2020    WBCUA 343 12/28/2020    BACTERIA 4+ 12/28/2020    RBCUA 4 12/28/2020    BLOODU MODERATE 12/28/2020    SPECGRAV >=1.030 12/28/2020    GLUCOSEU Negative 12/28/2020       Radiology:  CT ABDOMEN PELVIS W IV CONTRAST Additional Contrast? None   Final Result   1. Acute left pyelonephritis; correlate with urinalysis.          XR CHEST PORTABLE   Final Result   No active cardiopulmonary disease         US GALLBLADDER RUQ    (Results Pending)           Assessment/Plan:    Active Hospital Problems    Diagnosis    Pyelonephritis [N12]    Alcohol withdrawal syndrome with complication ([facility] [address] [H97.765]    Suicidal ideation [R45.851]     Left Pyelonephritis  - continue IV Levaquin  - repeat urine culture sent as patient had been spiking fevers on Abx    RUQ pain  - repeat lfts   - US ordered, NPO until procedure completed; less likely cholecystitis given normal LFTs and negative Powers's sign on exam    Suicidal ideation  - 1:1 sitter  - IP psych when medically stable for discharge    PTSD  Cocaine abuse  - would benefit from drug rehab program  - previously on subutex, recommend OP f/u    Alcohol dependence  - no in withdrawal  - ativn prn for anxiety      DVT Prophylaxis: scd  Diet: DIET GENERAL;  Dietary Nutrition Supplements: Other Oral Supplement (see comment)  Code Status: Full Code      Dispo - continue care, IP psych on discharge    Monica Street MD

## 2021-01-01 NOTE — PROGRESS NOTES
Has set off bed alarm several times since sitter no longer in room, again reviewed safety issues, \" I just forget\".

## 2021-01-01 NOTE — CONSULTS
Psych Consult Progress Note    01/01/21      Hue Thrasher  [id number]  Chief Complaint   Patient presents with    Suicidal     states she doesnt feel well. she states she will drive into traffic     Fever     103.2 in er      I have reviewed recent documentation. Hue Thrasher is a 54 y.o. female  With  has a past medical history of Gastric ulcer, Hypertension, and Suicidal ideation. Subjective/Interval Hx:  Pt is feeling much better. Mood continues to improve. Not feeling suicidal.  Mostly would like to go from our hospital straight to a rehab program, if possible. Smiling, had a nice, productive conversation with a friend. Concerned appropriately about some medical issues. Quality:  Depression anxiety  Severity:  mod  Duration:  Weeks to months. Context:  As above.   Location:  Brain    Objective:  Vitals:    01/01/21 1119   BP: 133/80   Pulse:    Resp:    Temp:    SpO2:      Recent Results (from the past 168 hour(s))   CBC Auto Differential    Collection Time: 12/28/20  8:03 AM   Result Value Ref Range    WBC 11.0 4.0 - 11.0 K/uL    RBC 4.43 4.00 - 5.20 M/uL    Hemoglobin 14.0 12.0 - 16.0 g/dL    Hematocrit 41.2 36.0 - 48.0 %    MCV 93.1 80.0 - 100.0 fL    MCH 31.5 26.0 - 34.0 pg    MCHC 33.9 31.0 - 36.0 g/dL    RDW 14.7 12.4 - 15.4 %    Platelets 144 (L) 652 - 450 K/uL    MPV 9.9 5.0 - 10.5 fL    Neutrophils % 73.9 %    Lymphocytes % 12.8 %    Monocytes % 12.7 %    Eosinophils % 0.4 %    Basophils % 0.2 %    Neutrophils Absolute 8.1 (H) 1.7 - 7.7 K/uL    Lymphocytes Absolute 1.4 1.0 - 5.1 K/uL    Monocytes Absolute 1.4 (H) 0.0 - 1.3 K/uL    Eosinophils Absolute 0.0 0.0 - 0.6 K/uL    Basophils Absolute 0.0 0.0 - 0.2 K/uL   Comprehensive Metabolic Panel w/ Reflex to MG    Collection Time: 12/28/20  8:03 AM   Result Value Ref Range    Sodium 135 (L) 136 - 145 mmol/L    Potassium reflex Magnesium 3.4 (L) 3.5 - 5.1 mmol/L    Chloride 99 99 - 110 mmol/L    CO2 21 21 - 32 mmol/L    Anion Gap 15 3 - 16    Glucose 173 (H) 70 - 99 mg/dL    BUN 14 7 - 20 mg/dL    CREATININE 1.0 0.6 - 1.1 mg/dL    GFR Non- 59 (A) >60    GFR African American >60 >60    Calcium 9.1 8.3 - 10.6 mg/dL    Total Protein 7.4 6.4 - 8.2 g/dL    Alb 3.8 3.4 - 5.0 g/dL    Albumin/Globulin Ratio 1.1 1.1 - 2.2    Total Bilirubin 0.6 0.0 - 1.0 mg/dL    Alkaline Phosphatase 96 40 - 129 U/L    ALT 11 10 - 40 U/L    AST 23 15 - 37 U/L    Globulin 3.6 g/dL   Ethanol    Collection Time: 12/28/20  8:03 AM   Result Value Ref Range    Ethanol Lvl 12 mg/dL   Acetaminophen (TYLENOL) level    Collection Time: 12/28/20  8:03 AM   Result Value Ref Range    Acetaminophen Level <5 (L) 10 - 30 ug/mL   Salicylate    Collection Time: 12/28/20  8:03 AM   Result Value Ref Range    Salicylate, Serum <2.1 (L) 15.0 - 30.0 mg/dL   COVID-19    Collection Time: 12/28/20  8:03 AM   Result Value Ref Range    SARS-CoV-2, NAAT Not Detected Not Detected   CK    Collection Time: 12/28/20  8:03 AM   Result Value Ref Range    Total  (H) 26 - 192 U/L   Lactic Acid, Plasma    Collection Time: 12/28/20  8:03 AM   Result Value Ref Range    Lactic Acid 1.8 0.4 - 2.0 mmol/L   Procalcitonin    Collection Time: 12/28/20  8:03 AM   Result Value Ref Range    Procalcitonin 0.25 (H) 0.00 - 0.15 ng/mL   Magnesium    Collection Time: 12/28/20  8:03 AM   Result Value Ref Range    Magnesium 1.20 (L) 1.80 - 2.40 mg/dL   CK    Collection Time: 12/28/20  8:03 AM   Result Value Ref Range    Total  (H) 26 - 192 U/L   DRUG SCREEN MULTI URINE    Collection Time: 12/28/20  8:30 AM   Result Value Ref Range    Amphetamine Screen, Urine Neg Negative <1000ng/mL    Barbiturate Screen, Ur Neg Negative <200 ng/mL    Benzodiazepine Screen, Urine Neg Negative <200 ng/mL    Cannabinoid Scrn, Ur Neg Negative <50 ng/mL    Cocaine Metabolite Screen, Urine POSITIVE (A) Negative <300 ng/mL    Opiate Scrn, Ur Neg Negative <300 ng/mL    PCP Screen, Urine Neg Negative <25 ng/mL Methadone Screen, Urine Neg Negative <300 ng/mL    Propoxyphene Scrn, Ur Neg Negative <300 ng/mL    Oxycodone Urine Neg Negative <100 ng/ml    pH, UA 6.0     Drug Screen Comment: see below    Urinalysis    Collection Time: 12/28/20  8:30 AM   Result Value Ref Range    Color, UA Yellow Straw/Yellow    Clarity, UA Clear Clear    Glucose, Ur Negative Negative mg/dL    Bilirubin Urine Negative Negative    Ketones, Urine Negative Negative mg/dL    Specific Gravity, UA >=1.030 1.005 - 1.030    Blood, Urine MODERATE (A) Negative    pH, UA 6.0 5.0 - 8.0    Protein, UA 30 (A) Negative mg/dL    Urobilinogen, Urine 1.0 <2.0 E.U./dL    Nitrite, Urine POSITIVE (A) Negative    Leukocyte Esterase, Urine SMALL (A) Negative    Microscopic Examination YES     Urine Type NotGiven    Pregnancy, urine    Collection Time: 12/28/20  8:30 AM   Result Value Ref Range    HCG(Urine) Pregnancy Test Negative Detects HCG level >20 MIU/mL   Microscopic Urinalysis    Collection Time: 12/28/20  8:30 AM   Result Value Ref Range    Bacteria, UA 4+ (A) None Seen /HPF    Hyaline Casts, UA 0 0 - 8 /LPF    WBC,  (H) 0 - 5 /HPF    RBC, UA 4 0 - 4 /HPF    Epithelial Cells, UA 1 0 - 5 /HPF   Comprehensive Metabolic Panel w/ Reflex to MG    Collection Time: 12/29/20  7:28 AM   Result Value Ref Range    Sodium 133 (L) 136 - 145 mmol/L    Potassium reflex Magnesium 3.4 (L) 3.5 - 5.1 mmol/L    Chloride 100 99 - 110 mmol/L    CO2 24 21 - 32 mmol/L    Anion Gap 9 3 - 16    Glucose 140 (H) 70 - 99 mg/dL    BUN 15 7 - 20 mg/dL    CREATININE 1.1 0.6 - 1.1 mg/dL    GFR Non- 53 (A) >60    GFR African American >60 >60    Calcium 7.8 (L) 8.3 - 10.6 mg/dL    Total Protein 6.2 (L) 6.4 - 8.2 g/dL    Alb 3.1 (L) 3.4 - 5.0 g/dL    Albumin/Globulin Ratio 1.0 (L) 1.1 - 2.2    Total Bilirubin 0.6 0.0 - 1.0 mg/dL    Alkaline Phosphatase 63 40 - 129 U/L    ALT 10 10 - 40 U/L    AST 17 15 - 37 U/L    Globulin 3.1 g/dL   CBC auto differential    Collection Time: 12/29/20  7:28 AM   Result Value Ref Range    WBC 7.0 4.0 - 11.0 K/uL    RBC 4.19 4.00 - 5.20 M/uL    Hemoglobin 13.0 12.0 - 16.0 g/dL    Hematocrit 39.3 36.0 - 48.0 %    MCV 93.8 80.0 - 100.0 fL    MCH 30.9 26.0 - 34.0 pg    MCHC 32.9 31.0 - 36.0 g/dL    RDW 15.0 12.4 - 15.4 %    Platelets 044 (L) 116 - 450 K/uL    MPV 9.9 5.0 - 10.5 fL    Neutrophils % 77.2 %    Lymphocytes % 12.8 %    Monocytes % 9.1 %    Eosinophils % 0.3 %    Basophils % 0.6 %    Neutrophils Absolute 5.4 1.7 - 7.7 K/uL    Lymphocytes Absolute 0.9 (L) 1.0 - 5.1 K/uL    Monocytes Absolute 0.6 0.0 - 1.3 K/uL    Eosinophils Absolute 0.0 0.0 - 0.6 K/uL    Basophils Absolute 0.0 0.0 - 0.2 K/uL   Protime-INR    Collection Time: 12/29/20  7:28 AM   Result Value Ref Range    Protime 14.4 (H) 10.0 - 13.2 sec    INR 1.24 (H) 0.86 - 1.14   Magnesium    Collection Time: 12/29/20  7:28 AM   Result Value Ref Range    Magnesium 1.30 (L) 1.80 - 2.40 mg/dL   Basic Metabolic Panel    Collection Time: 12/30/20  7:43 AM   Result Value Ref Range    Sodium 131 (L) 136 - 145 mmol/L    Potassium 3.8 3.5 - 5.1 mmol/L    Chloride 100 99 - 110 mmol/L    CO2 20 (L) 21 - 32 mmol/L    Anion Gap 11 3 - 16    Glucose 116 (H) 70 - 99 mg/dL    BUN 12 7 - 20 mg/dL    CREATININE 1.0 0.6 - 1.1 mg/dL    GFR Non- 59 (A) >60    GFR African American >60 >60    Calcium 7.9 (L) 8.3 - 10.6 mg/dL   Magnesium    Collection Time: 12/30/20  7:43 AM   Result Value Ref Range    Magnesium 1.80 1.80 - 2.40 mg/dL   Comprehensive Metabolic Panel    Collection Time: 01/01/21  8:14 AM   Result Value Ref Range    Sodium 134 (L) 136 - 145 mmol/L    Potassium 3.8 3.5 - 5.1 mmol/L    Chloride 102 99 - 110 mmol/L    CO2 20 (L) 21 - 32 mmol/L    Anion Gap 12 3 - 16    Glucose 90 70 - 99 mg/dL    BUN 12 7 - 20 mg/dL    CREATININE 0.7 0.6 - 1.1 mg/dL    GFR Non-African American >60 >60    GFR African American >60 >60    Calcium 7.9 (L) 8.3 - 10.6 mg/dL    Total Protein 6.3 (L) 6.4 - 8.2 g/dL    Alb 3.0 (L) 3.4 - 5.0 g/dL    Albumin/Globulin Ratio 0.9 (L) 1.1 - 2.2    Total Bilirubin 0.3 0.0 - 1.0 mg/dL    Alkaline Phosphatase 64 40 - 129 U/L    ALT 9 (L) 10 - 40 U/L    AST 16 15 - 37 U/L    Globulin 3.3 g/dL   CBC Auto Differential    Collection Time: 01/01/21  8:14 AM   Result Value Ref Range    WBC 6.3 4.0 - 11.0 K/uL    RBC 4.21 4.00 - 5.20 M/uL    Hemoglobin 13.0 12.0 - 16.0 g/dL    Hematocrit 39.4 36.0 - 48.0 %    MCV 93.6 80.0 - 100.0 fL    MCH 30.8 26.0 - 34.0 pg    MCHC 32.9 31.0 - 36.0 g/dL    RDW 14.9 12.4 - 15.4 %    Platelets 582 901 - 928 K/uL    MPV 10.0 5.0 - 10.5 fL    Neutrophils % 42.0 %    Lymphocytes % 35.7 %    Monocytes % 18.1 %    Eosinophils % 3.5 %    Basophils % 0.7 %    Neutrophils Absolute 2.7 1.7 - 7.7 K/uL    Lymphocytes Absolute 2.3 1.0 - 5.1 K/uL    Monocytes Absolute 1.1 0.0 - 1.3 K/uL    Eosinophils Absolute 0.2 0.0 - 0.6 K/uL    Basophils Absolute 0.0 0.0 - 0.2 K/uL       Current Facility-Administered Medications   Medication Dose Route Frequency Provider Last Rate Last Admin    levoFLOXacin (LEVAQUIN) 750 MG/150ML infusion 750 mg  750 mg Intravenous Q24H Keri Castro MD   Stopped at 12/31/20 1809    LORazepam (ATIVAN) injection 1 mg  1 mg Intravenous Q4H PRN Jessica Heredia MD   1 mg at 01/01/21 1301    hydrOXYzine (ATARAX) tablet 10 mg  10 mg Oral 4x Daily PRN Jessica Heredia MD   10 mg at 01/01/21 1143    escitalopram (LEXAPRO) tablet 10 mg  10 mg Oral Daily Adele Blue MD   10 mg at 01/01/21 1135    nicotine (NICODERM CQ) 21 MG/24HR 1 patch  1 patch Transdermal Daily Darion De Luna APRN - CNP   1 patch at 01/01/21 1139    potassium chloride (KLOR-CON M) extended release tablet 40 mEq  40 mEq Oral PRN Jessica Heredia MD   40 mEq at 12/29/20 1430    Or    potassium bicarb-citric acid (EFFER-K) effervescent tablet 40 mEq  40 mEq Oral PRN Jessica Heredia MD        magnesium sulfate 1 g in dextrose 5% 100 mL IVPB  1 g Intravenous PRN Jessica Heredia MD  mirtazapine (REMERON) tablet 7.5 mg  7.5 mg Oral Nightly Rob Frey MD   7.5 mg at 12/31/20 2048    acetaminophen (TYLENOL) tablet 650 mg  650 mg Oral Q6H PRN Stephie Das MD   650 mg at 01/01/21 0533    atenolol (TENORMIN) tablet 100 mg  100 mg Oral Daily Stephie Das MD   100 mg at 01/01/21 1134    gabapentin (NEURONTIN) capsule 300 mg  300 mg Oral BID Stephie Das MD   300 mg at 01/01/21 1135    pantoprazole (PROTONIX) tablet 40 mg  40 mg Oral QAM AC Stephie Das MD   40 mg at 01/01/21 0533    spironolactone (ALDACTONE) tablet 50 mg  50 mg Oral Daily Stephie Das MD   50 mg at 01/01/21 1134    sodium chloride flush 0.9 % injection 10 mL  10 mL Intravenous 2 times per day Stephie Das MD   10 mL at 01/01/21 1146    sodium chloride flush 0.9 % injection 10 mL  10 mL Intravenous PRN Stephie Dsa MD   10 mL at 01/01/21 1300    promethazine (PHENERGAN) tablet 12.5 mg  12.5 mg Oral Q6H PRN Stephie Das MD        Or    ondansetron [facility]) injection 4 mg  4 mg Intravenous Q6H PRN Stephie Das MD        polyethylene glycol [facility]) packet 17 g  17 g Oral Daily PRN Stephie Das MD        thiamine mononitrate tablet 100 mg  100 mg Oral Daily Stephie Das MD   100 mg at 01/01/21 1135    multivitamin 1 tablet  1 tablet Oral Daily Stephie Das MD   1 tablet at 51/37/83 7153    folic acid (FOLVITE) tablet 1 mg  1 mg Oral Daily Stephie Das MD   1 mg at 01/01/21 1135     ROS:  No tremor, gait is slow    MSE:  A-in gowns, good EC, pleasant and engageable  A-restricted, but had nice spontaneous smile x 2  M-improving depression  S-grossly A + O  I/J-fair/fair  T-linear, goal-directed. Speech c dec tone, vol, amnt. No SI, no resp to int stim. Recs:    1. Depression NOS-Imminent dangerous seems to have resolved, and pt looks clinically better. At this point she doesn't feel like she needs psych inpatient, and I agree.   What she'd really like is to go right from here into a drug rehab program, which sounds like a great idea if such a thing can be done. I strongly suspect a drug-induced aspect to this case, given her rapid improvement with relatively little intervention. This patient has a number of RF for future danger:  Mood d/o history, drug hx, hx of dangerousness, isolation/poor social support, chronic medical illness. This patient is long term high risk for future danger. However, now clinically sober, stable, and with a reasonable follow up plan to limit risk. Thus, no imminent risk. 2.  Drug abuse-This pt desperately needs sobriety.

## 2021-01-02 ENCOUNTER — APPOINTMENT (OUTPATIENT)
Dept: ULTRASOUND IMAGING | Age: 46
DRG: 951 | End: 2021-01-02
Payer: COMMERCIAL

## 2021-01-02 PROCEDURE — 6360000002 HC RX W HCPCS: Performed by: HOSPITALIST

## 2021-01-02 PROCEDURE — 76705 ECHO EXAM OF ABDOMEN: CPT

## 2021-01-02 PROCEDURE — 6370000000 HC RX 637 (ALT 250 FOR IP): Performed by: NURSE PRACTITIONER

## 2021-01-02 PROCEDURE — 6370000000 HC RX 637 (ALT 250 FOR IP): Performed by: PSYCHIATRY & NEUROLOGY

## 2021-01-02 PROCEDURE — 6370000000 HC RX 637 (ALT 250 FOR IP): Performed by: HOSPITALIST

## 2021-01-02 PROCEDURE — 6360000002 HC RX W HCPCS: Performed by: FAMILY MEDICINE

## 2021-01-02 PROCEDURE — 6370000000 HC RX 637 (ALT 250 FOR IP): Performed by: SURGERY

## 2021-01-02 PROCEDURE — 6370000000 HC RX 637 (ALT 250 FOR IP): Performed by: INTERNAL MEDICINE

## 2021-01-02 PROCEDURE — 1200000000 HC SEMI PRIVATE

## 2021-01-02 PROCEDURE — 2580000003 HC RX 258: Performed by: HOSPITALIST

## 2021-01-02 PROCEDURE — 99254 IP/OBS CNSLTJ NEW/EST MOD 60: CPT | Performed by: SURGERY

## 2021-01-02 RX ORDER — PANTOPRAZOLE SODIUM 40 MG/1
40 TABLET, DELAYED RELEASE ORAL
Status: DISCONTINUED | OUTPATIENT
Start: 2021-01-03 | End: 2021-01-04

## 2021-01-02 RX ORDER — SUCRALFATE 1 G/1
1 TABLET ORAL
Status: DISCONTINUED | OUTPATIENT
Start: 2021-01-02 | End: 2021-01-04

## 2021-01-02 RX ORDER — FLUCONAZOLE 100 MG/1
150 TABLET ORAL ONCE
Status: COMPLETED | OUTPATIENT
Start: 2021-01-02 | End: 2021-01-02

## 2021-01-02 RX ADMIN — SODIUM CHLORIDE, PRESERVATIVE FREE 10 ML: 5 INJECTION INTRAVENOUS at 22:22

## 2021-01-02 RX ADMIN — HYDROXYZINE HYDROCHLORIDE 10 MG: 10 TABLET, FILM COATED ORAL at 20:38

## 2021-01-02 RX ADMIN — ACETAMINOPHEN 650 MG: 325 TABLET ORAL at 17:51

## 2021-01-02 RX ADMIN — ATENOLOL 100 MG: 50 TABLET ORAL at 10:01

## 2021-01-02 RX ADMIN — ACETAMINOPHEN 650 MG: 325 TABLET ORAL at 10:22

## 2021-01-02 RX ADMIN — SUCRALFATE 1 G: 1 TABLET ORAL at 20:38

## 2021-01-02 RX ADMIN — ACETAMINOPHEN 650 MG: 325 TABLET ORAL at 23:43

## 2021-01-02 RX ADMIN — SPIRONOLACTONE 50 MG: 25 TABLET ORAL at 10:00

## 2021-01-02 RX ADMIN — GABAPENTIN 300 MG: 300 CAPSULE ORAL at 10:00

## 2021-01-02 RX ADMIN — SUCRALFATE 1 G: 1 TABLET ORAL at 17:45

## 2021-01-02 RX ADMIN — LORAZEPAM 1 MG: 2 INJECTION INTRAMUSCULAR; INTRAVENOUS at 13:00

## 2021-01-02 RX ADMIN — LORAZEPAM 1 MG: 2 INJECTION INTRAMUSCULAR; INTRAVENOUS at 22:22

## 2021-01-02 RX ADMIN — LORAZEPAM 1 MG: 2 INJECTION INTRAMUSCULAR; INTRAVENOUS at 17:51

## 2021-01-02 RX ADMIN — ESCITALOPRAM OXALATE 10 MG: 10 TABLET ORAL at 10:01

## 2021-01-02 RX ADMIN — THIAMINE HCL TAB 100 MG 100 MG: 100 TAB at 10:00

## 2021-01-02 RX ADMIN — GABAPENTIN 300 MG: 300 CAPSULE ORAL at 20:38

## 2021-01-02 RX ADMIN — SODIUM CHLORIDE, PRESERVATIVE FREE 10 ML: 5 INJECTION INTRAVENOUS at 10:01

## 2021-01-02 RX ADMIN — THERA TABS 1 TABLET: TAB at 10:01

## 2021-01-02 RX ADMIN — LEVOFLOXACIN 750 MG: 5 INJECTION, SOLUTION INTRAVENOUS at 16:13

## 2021-01-02 RX ADMIN — MIRTAZAPINE 7.5 MG: 15 TABLET, FILM COATED ORAL at 20:38

## 2021-01-02 RX ADMIN — FLUCONAZOLE 150 MG: 100 TABLET ORAL at 10:22

## 2021-01-02 RX ADMIN — FOLIC ACID 1 MG: 1 TABLET ORAL at 10:01

## 2021-01-02 ASSESSMENT — PAIN DESCRIPTION - LOCATION: LOCATION: HEAD

## 2021-01-02 ASSESSMENT — PAIN SCALES - GENERAL
PAINLEVEL_OUTOF10: 0
PAINLEVEL_OUTOF10: 3
PAINLEVEL_OUTOF10: 3

## 2021-01-02 ASSESSMENT — PAIN DESCRIPTION - PAIN TYPE: TYPE: ACUTE PAIN

## 2021-01-02 NOTE — CONSULTS
General Surgery Consult Note      Dhaval Gordon   : 1975 MRN: [id number]  Date of Admission: 2020  Admitting Marcel Quintero MD  Primary Care Physician: Jj Corral MD    Chief Complaint   Patient presents with    Suicidal     states she doesnt feel well. she states she will drive into traffic     Fever     103.2 in er        Reason for Consult: possible symptomatic cholelithiasis    Diagnosis Present on Admission:   Alcohol withdrawal syndrome with complication ([address]   Suicidal ideation   Pyelonephritis      History of Present Illness  Dhaval Gordon is a 54 y.o. female admitted on 2020 for suicidal ideation, depression, and alcohol/cocaine abuse. She states that she had a history of gastric bypass in  with Dr. Saurabh Goldman at [facility]. She went from 320 lbs down to 220 lbs, but has put 20 lbs on over the past 9 months due to inactivity during covid. She states that she had ulcers related to her bypass in . Over the past few months, she has noticed post prandial pain that occurs almost instantly after drinking any liquids (including water) or eating any solids. Pain lasts for an hour or two before going away. Denies any nausea or vomiting. No fevers or chills. Past Medical History:   Diagnosis Date    Gastric ulcer     Hypertension     Suicidal ideation      Past Surgical History:   Procedure Laterality Date     SECTION      ENDOMETRIAL ABLATION      GASTRIC BYPASS SURGERY       Family history reviewed and otherwise negative. History reviewed. No pertinent family history.   Social History     Socioeconomic History    Marital status: Single     Spouse name: Not on file    Number of children: Not on file    Years of education: Not on file    Highest education level: Not on file   Occupational History    Not on file   Social Needs    Financial resource strain: Not on file    Food insecurity     Worry: Not on file     Inability: Not on file   [employer] needs     Medical: Not on file     Non-medical: Not on file   Tobacco Use    Smoking status: Current Every Day Smoker     Packs/day: 0.50     Years: 20.00     Pack years: 10.00     Types: Cigarettes    Smokeless tobacco: Never Used   Substance and Sexual Activity    Alcohol use: Yes     Comment: 5th a day     Drug use: Yes     Types: Cocaine, Marijuana, Opiates      Comment: snorts     Sexual activity: Not on file   Lifestyle    Physical activity     Days per week: Not on file     Minutes per session: Not on file    Stress: Not on file   Relationships    Social connections     Talks on phone: Not on file     Gets together: Not on file     Attends Gnosticism service: Not on file     Active member of club or organization: Not on file     Attends meetings of clubs or organizations: Not on file     Relationship status: Not on file    Intimate partner violence     Fear of current or ex partner: Not on file     Emotionally abused: Not on file     Physically abused: Not on file     Forced sexual activity: Not on file   Other Topics Concern    Not on file   Social History Narrative    Not on file     Allergies   Allergen Reactions    Ace Inhibitors Anaphylaxis     \"Throat and Mouth Swelled UP\"  Angioedema   Angioedema       Ibuprofen      Stomach ulcers    Pcn [Penicillins] Itching    Pork (Porcine) Protein      Presybeterian     Prior to Admission medications    Medication Sig Start Date End Date Taking? Authorizing Provider   buprenorphine-naloxone (SUBOXONE) 8-2 MG SUBL SL tablet 1 tablet daily.   10/12/20   Historical Provider, MD   gabapentin (NEURONTIN) 300 MG capsule  12/16/20   Historical Provider, MD   hydroCHLOROthiazide (HYDRODIURIL) 12.5 MG tablet  9/22/20   Historical Provider, MD   lansoprazole (PREVACID) 30 MG capsule Take 30 mg by mouth daily    Historical Provider, MD   ibuprofen (ADVIL;MOTRIN) 600 MG tablet Take 1 tablet by mouth every 6 hours as needed for Pain 9/5/16 01/01/21  0814   GLOB 3.3   AST 16   ALT 9*     Invalid input(s): [facility], [facility], [facility], [place], [facility], [place], [place], [place], [place], [facility]    Imaging  US GALLBLADDER RUQ   Final Result   1. Cholelithiasis without evidence of acute cholecystitis. CT ABDOMEN PELVIS W IV CONTRAST Additional Contrast? None   Final Result   1. Acute left pyelonephritis; correlate with urinalysis. XR CHEST PORTABLE   Final Result   No active cardiopulmonary disease             Assessment  Yakelin Rodrigues is a 54 y.o. female admitted for depression, alcohol/cocaine abuse, pyelonephritis, and history of post prandial abdominal pain    Plan    1. Post prandial abdominal pain  · Hx of gastric bypass 2004 along with ulcers in 2008  · Cholelithiasis on US, but symptoms not completely consistent with symptomatic cholelithiasis  · Will make PPI BID and add carafate. · Will consult GI for EGD to evaluate for marginal ulcers. If negative, may benefit from laparoscopic cholecystectomy with cholangiogram.  2. Depression, alcohol/cocaine abuse  · Plans to transfer to rehab facility after discharge    3.   Pyelonephritis - continue IV abx per primary team    Adriana Mohs, MD

## 2021-01-02 NOTE — PROGRESS NOTES
Hospitalist Progress Note      PCP: Poonam Monterroso MD    Date of Admission: 12/28/2020      Hospital Course:   54 y.o. female presented to [facility] complaining of depression stating that she is going to drive herself into traffic according to the notes everyone would be better without her.  When the nurse walked her to her room she wrapped the blood pressure cord around her neck.  Stated her last drink of alcohol was last nigh,t last used cocaine 2 days ago and fentanyl last week.  Reportedly snorts both substances. she had a bottle of alcohol in her coat jacket. She requested alcohol treatment, acknowledges being a chronic alcoholic, and drank a bottle of vodka before coming to the ER.  Complained of abdominal pain, has history of gastric ulcer. U tox positive cocaine. acetaminophen and salicylate negative, EtOH 12, LFT normal mild thrombocytopenia platelet 163.  VQ81 negative, UA + nitrite, small LE, 343 WBC consistent with acute urinary tract infection.  Beta-hCG negative. CT abdomen shows stranding left perinephric or pyelonephritis. Given rocephin IVPB and 2L IV NS bolus in ER.       Admitted inpatient status with 1:1 sitter. Treated IV rocephin for pyelonephritis, hydration IV  NS. Placed on CIWA protocol. Seen by psychiatry who recommends inpatient psych placement when medically cleared. 12/31 - low grade fever today, abx switched to Levaquin    1/1 - having right sided abdominal pain, intermittent, 8/10, with food making the pain better    1/2 - still with right sided pain, now says much worse with eating. Stated that when the ultrasound tech was pressing the probe in the RUQ, she was having pain. Subjective:    Patient seen and examined. Complains of right sided abdominal pain. Worse with eating. Ultrasound results reviewed with patient. Still some discomfort in her left lower back.     Medications:  Reviewed    Infusion Medications   Scheduled Medications    levofloxacin 750 mg Intravenous Q24H    escitalopram  10 mg Oral Daily    nicotine  1 patch Transdermal Daily    mirtazapine  7.5 mg Oral Nightly    atenolol  100 mg Oral Daily    gabapentin  300 mg Oral BID    pantoprazole  40 mg Oral QAM AC    spironolactone  50 mg Oral Daily    sodium chloride flush  10 mL Intravenous 2 times per day    thiamine mononitrate  100 mg Oral Daily    multivitamin  1 tablet Oral Daily    folic acid  1 mg Oral Daily     PRN Meds: LORazepam, hydrOXYzine, potassium chloride **OR** potassium alternative oral replacement, magnesium sulfate, acetaminophen, sodium chloride flush, promethazine **OR** ondansetron, polyethylene glycol      Intake/Output Summary (Last 24 hours) at 1/2/2021 1011  Last data filed at 1/1/2021 1948  Gross per 24 hour   Intake 730 ml   Output --   Net 730 ml       Physical Exam Performed:    BP (!) 145/96   Pulse 58   Temp 98.2 °F (36.8 °C) (Oral)   Resp 16   Ht 5' 10\" (1.778 m)   Wt 238 lb 1.6 oz (108 kg)   SpO2 98%   BMI 34.16 kg/m²       General appearance: No apparent distress, alert and cooperative. HEENT: Conjunctivae/corneas clear, neck supple w/ full ROM  Respiratory:  Normal respiratory effort. Clear to auscultation, bilaterally without Rales/Wheezes/Rhonchi. Cardiovascular: Regular rate and rhythm, normal S1/S2, no murmurs  Abdomen: Soft, RUQ ttp, negative Powers's sign, non-distended with normal bowel sounds.   Musculoskeletal: No edema bilaterally, mild low back bilateral ttp  Neurologic:  No new focal deficits  Psychiatric: Alert and oriented, normal insight  Capillary Refill: Brisk,< 3 seconds   Peripheral Pulses: +2 palpable, equal bilaterally       Labs:   Recent Labs     01/01/21 0814   WBC 6.3   HGB 13.0   HCT 39.4        Recent Labs     01/01/21 0814   *   K 3.8      CO2 20*   BUN 12   CREATININE 0.7   CALCIUM 7.9*     Recent Labs     01/01/21 0814   AST 16   ALT 9*   BILITOT 0.3   ALKPHOS 64     No results for input(s): INR in the last 72 hours. No results for input(s): Tosin Purcell in the last 72 hours. Urinalysis:      Lab Results   Component Value Date    NITRU POSITIVE 12/28/2020    WBCUA 343 12/28/2020    BACTERIA 4+ 12/28/2020    RBCUA 4 12/28/2020    BLOODU MODERATE 12/28/2020    SPECGRAV >=1.030 12/28/2020    GLUCOSEU Negative 12/28/2020       Radiology:  US GALLBLADDER RUQ   Final Result   1. Cholelithiasis without evidence of acute cholecystitis. CT ABDOMEN PELVIS W IV CONTRAST Additional Contrast? None   Final Result   1. Acute left pyelonephritis; correlate with urinalysis.          XR CHEST PORTABLE   Final Result   No active cardiopulmonary disease                 Assessment/Plan:    Active Hospital Problems    Diagnosis    Pyelonephritis [N12]    Alcohol withdrawal syndrome with complication ([facility] [address] [W26.791]    Suicidal ideation [R45.851]     Left Pyelonephritis  - continue IV Levaquin  - repeat urine culture positive for yeast only  - will give dose of PO fluconazole    Symptomatic cholelithiasis  - repeat lfts   - US showed cholelithiasis but no evidence of cholecystitis  - given her symptoms and the fact that she has a lot of discomfort with eating, will ask general surgery to review the ultrasound and discuss with patient    Suicidal ideation - improved  - psychiatry consulted  - no longer meets criteria for inpatient psych and sitter discontinued    PTSD  Cocaine abuse  - would benefit from drug rehab program  - previously on subutex, recommend OP f/u    Alcohol dependence  - no in withdrawal  - ativn prn for anxiety      DVT Prophylaxis: scd  Diet: DIET GENERAL;  Code Status: Full Code      Dispo - continue care, await general surgery elizabeth Sousa MD

## 2021-01-03 LAB
A/G RATIO: 0.9 (ref 1.1–2.2)
ALBUMIN SERPL-MCNC: 3.5 G/DL (ref 3.4–5)
ALP BLD-CCNC: 86 U/L (ref 40–129)
ALT SERPL-CCNC: 9 U/L (ref 10–40)
ANION GAP SERPL CALCULATED.3IONS-SCNC: 11 MMOL/L (ref 3–16)
AST SERPL-CCNC: 16 U/L (ref 15–37)
BILIRUB SERPL-MCNC: <0.2 MG/DL (ref 0–1)
BUN BLDV-MCNC: 13 MG/DL (ref 7–20)
CALCIUM SERPL-MCNC: 9.8 MG/DL (ref 8.3–10.6)
CHLORIDE BLD-SCNC: 102 MMOL/L (ref 99–110)
CO2: 20 MMOL/L (ref 21–32)
CREAT SERPL-MCNC: 1.1 MG/DL (ref 0.6–1.1)
GFR AFRICAN AMERICAN: >60
GFR NON-AFRICAN AMERICAN: 53
GLOBULIN: 3.7 G/DL
GLUCOSE BLD-MCNC: 182 MG/DL (ref 70–99)
POTASSIUM REFLEX MAGNESIUM: 3.9 MMOL/L (ref 3.5–5.1)
SARS-COV-2, NAAT: NOT DETECTED
SODIUM BLD-SCNC: 133 MMOL/L (ref 136–145)
TOTAL PROTEIN: 7.2 G/DL (ref 6.4–8.2)

## 2021-01-03 PROCEDURE — 6360000002 HC RX W HCPCS: Performed by: INTERNAL MEDICINE

## 2021-01-03 PROCEDURE — 1200000000 HC SEMI PRIVATE

## 2021-01-03 PROCEDURE — 6360000002 HC RX W HCPCS: Performed by: HOSPITALIST

## 2021-01-03 PROCEDURE — 6370000000 HC RX 637 (ALT 250 FOR IP): Performed by: HOSPITALIST

## 2021-01-03 PROCEDURE — 6370000000 HC RX 637 (ALT 250 FOR IP): Performed by: PSYCHIATRY & NEUROLOGY

## 2021-01-03 PROCEDURE — 6370000000 HC RX 637 (ALT 250 FOR IP): Performed by: SURGERY

## 2021-01-03 PROCEDURE — 99232 SBSQ HOSP IP/OBS MODERATE 35: CPT | Performed by: SURGERY

## 2021-01-03 PROCEDURE — 6370000000 HC RX 637 (ALT 250 FOR IP): Performed by: NURSE PRACTITIONER

## 2021-01-03 PROCEDURE — 80053 COMPREHEN METABOLIC PANEL: CPT

## 2021-01-03 PROCEDURE — 2580000003 HC RX 258: Performed by: HOSPITALIST

## 2021-01-03 PROCEDURE — 36415 COLL VENOUS BLD VENIPUNCTURE: CPT

## 2021-01-03 PROCEDURE — U0002 COVID-19 LAB TEST NON-CDC: HCPCS

## 2021-01-03 RX ORDER — LEVOFLOXACIN 5 MG/ML
750 INJECTION, SOLUTION INTRAVENOUS EVERY 24 HOURS
Status: DISCONTINUED | OUTPATIENT
Start: 2021-01-03 | End: 2021-01-07 | Stop reason: HOSPADM

## 2021-01-03 RX ADMIN — SUCRALFATE 1 G: 1 TABLET ORAL at 16:36

## 2021-01-03 RX ADMIN — SODIUM CHLORIDE, PRESERVATIVE FREE 10 ML: 5 INJECTION INTRAVENOUS at 10:07

## 2021-01-03 RX ADMIN — PANTOPRAZOLE SODIUM 40 MG: 40 TABLET, DELAYED RELEASE ORAL at 05:03

## 2021-01-03 RX ADMIN — MIRTAZAPINE 7.5 MG: 15 TABLET, FILM COATED ORAL at 20:20

## 2021-01-03 RX ADMIN — FOLIC ACID 1 MG: 1 TABLET ORAL at 07:56

## 2021-01-03 RX ADMIN — SODIUM CHLORIDE, PRESERVATIVE FREE 10 ML: 5 INJECTION INTRAVENOUS at 20:23

## 2021-01-03 RX ADMIN — ATENOLOL 100 MG: 50 TABLET ORAL at 07:55

## 2021-01-03 RX ADMIN — LORAZEPAM 1 MG: 2 INJECTION INTRAMUSCULAR; INTRAVENOUS at 07:55

## 2021-01-03 RX ADMIN — LEVOFLOXACIN 750 MG: 5 INJECTION, SOLUTION INTRAVENOUS at 16:36

## 2021-01-03 RX ADMIN — LORAZEPAM 1 MG: 2 INJECTION INTRAMUSCULAR; INTRAVENOUS at 15:20

## 2021-01-03 RX ADMIN — THIAMINE HCL TAB 100 MG 100 MG: 100 TAB at 07:56

## 2021-01-03 RX ADMIN — ACETAMINOPHEN 650 MG: 325 TABLET ORAL at 20:20

## 2021-01-03 RX ADMIN — SUCRALFATE 1 G: 1 TABLET ORAL at 11:44

## 2021-01-03 RX ADMIN — ESCITALOPRAM OXALATE 10 MG: 10 TABLET ORAL at 07:56

## 2021-01-03 RX ADMIN — PANTOPRAZOLE SODIUM 40 MG: 40 TABLET, DELAYED RELEASE ORAL at 16:36

## 2021-01-03 RX ADMIN — LORAZEPAM 1 MG: 2 INJECTION INTRAMUSCULAR; INTRAVENOUS at 20:20

## 2021-01-03 RX ADMIN — THERA TABS 1 TABLET: TAB at 07:56

## 2021-01-03 RX ADMIN — GABAPENTIN 300 MG: 300 CAPSULE ORAL at 07:56

## 2021-01-03 RX ADMIN — SUCRALFATE 1 G: 1 TABLET ORAL at 20:20

## 2021-01-03 RX ADMIN — SUCRALFATE 1 G: 1 TABLET ORAL at 05:03

## 2021-01-03 RX ADMIN — GABAPENTIN 300 MG: 300 CAPSULE ORAL at 20:20

## 2021-01-03 RX ADMIN — HYDROXYZINE HYDROCHLORIDE 10 MG: 10 TABLET, FILM COATED ORAL at 11:44

## 2021-01-03 RX ADMIN — SPIRONOLACTONE 50 MG: 25 TABLET ORAL at 07:55

## 2021-01-03 ASSESSMENT — PAIN SCALES - GENERAL: PAINLEVEL_OUTOF10: 0

## 2021-01-03 NOTE — PROGRESS NOTES
General Surgery  Daily Progress Note    Pt Name: Claudia [facility] Record Number: [id number]  Date of Birth 1975   Today's Date: 1/3/2021    Chief Complaint   Patient presents with    Suicidal     states she doesnt feel well. she states she will drive into traffic     Fever     103.2 in er        ASSESSMENT/PLAN  1. Post prandial abdominal pain - hx of gastric bypass along with ulcers. Cholelithiasis on US. GI consulted for EGD to evaluate for marginal ulcers, with plan to perform tomorrow. If EGD negative, will proceed with laparoscopic cholecystectomy with cholangiogram on Tuesday. 2. Left pyelonephritis - on levaquin per hospitalist, continue      Arelia Scarce has slightly improved from yesterday. Pain is still present after eating. No significant improvement on carafate. OBJECTIVE  VITALS:  height is 5' 10\" (1.778 m) and weight is 238 lb 1.6 oz (108 kg). Her oral temperature is 98 °F (36.7 °C). Her blood pressure is 169/94 (abnormal) and her pulse is 59. Her respiration is 16 and oxygen saturation is 98%. GENERAL: alert, no distress  LUNGS: normal respiratory effort, no accessory muscle use  HEART: normal rate and regular rhythm  ABDOMEN: soft, ND, minimal epigastric tenderness  EXTREMITY: no cyanosis and no clubbing  I/O last 3 completed shifts: In: 56 [P.O.:600; I.V.:10]  Out: -   No intake/output data recorded.     LABS  Recent Labs     01/01/21  0814 01/03/21  0931   WBC 6.3  --    HGB 13.0  --    HCT 39.4  --      --    * 133*   K 3.8 3.9    102   CO2 20* 20*   BUN 12 13   CREATININE 0.7 1.1   CALCIUM 7.9* 9.8   AST 16 16   ALT 9* 9*   BILITOT 0.3 <0.2     CBC with Differential:    Lab Results   Component Value Date    WBC 6.3 01/01/2021    RBC 4.21 01/01/2021    HGB 13.0 01/01/2021    HCT 39.4 01/01/2021     01/01/2021    MCV 93.6 01/01/2021    MCH 30.8 01/01/2021    MCHC 32.9 01/01/2021    RDW 14.9 01/01/2021    NRBC 0 08/20/2019    [facility]    LYMPHOPCT 35.7 01/01/2021    MONOPCT 18.1 01/01/2021    BASOPCT 0.7 01/01/2021    MONOSABS 1.1 01/01/2021    LYMPHSABS 2.3 01/01/2021    EOSABS 0.2 01/01/2021    BASOSABS 0.0 01/01/2021    DIFFTYPE Auto-K 06/07/2013     BMP:    Lab Results   Component Value Date     01/03/2021    K 3.9 01/03/2021     01/03/2021    CO2 20 01/03/2021    BUN 13 01/03/2021    LABALBU 3.5 01/03/2021    CREATININE 1.1 01/03/2021    CALCIUM 9.8 01/03/2021    GFRAA >60 01/03/2021    GFRAA >60 06/07/2013    LABGLOM 53 01/03/2021    GLUCOSE 182 01/03/2021     Magnesium:    Lab Results   Component Value Date    MG 1.80 12/30/2020     Phosphorus:  No results found for: Red Rubio MD  Electronically signed 1/3/2021 at 2:53 PM

## 2021-01-03 NOTE — PROGRESS NOTES
Pt noted with increased BP and anxiety. PRN atarax administered. Pt states feels better. Pt requested PRN ativan as well. Administered per order. Pt assisted to BR and into bed with SBA.

## 2021-01-03 NOTE — PROGRESS NOTES
Pt complaining of vaginal itching. She feels like she is getting a yeast infection from the antibiotics. I perfect served the MD this info. Carlito Navarro back from MD she had fluconazole yesterday.

## 2021-01-03 NOTE — CONSULTS
[facility]  [address] [address] [address], [address]  [phone] (phone)  [phone] (fax)          Past Medical History:   Diagnosis Date    Gastric ulcer     Hypertension     Suicidal ideation       Past Surgical History:   Procedure Laterality Date     SECTION      ENDOMETRIAL ABLATION      GASTRIC BYPASS SURGERY            Medications Prior to Admission: buprenorphine-naloxone (SUBOXONE) 8-2 MG SUBL SL tablet, 1 tablet daily. gabapentin (NEURONTIN) 300 MG capsule,   hydroCHLOROthiazide (HYDRODIURIL) 12.5 MG tablet,   lansoprazole (PREVACID) 30 MG capsule, Take 30 mg by mouth daily  ibuprofen (ADVIL;MOTRIN) 600 MG tablet, Take 1 tablet by mouth every 6 hours as needed for Pain  acetaminophen (TYLENOL) 325 MG tablet, Take 2 tablets by mouth every 6 hours as needed for Pain  sucralfate (CARAFATE) 1 GM/10ML suspension, Take 10 mLs by mouth 4 times daily (before meals and nightly) for 10 days  oxyCODONE-acetaminophen (PERCOCET) 5-325 MG per tablet, Take 1 tablet by mouth every 8 hours as needed for Pain  atenolol (TENORMIN) 100 MG tablet, Take 100 mg by mouth daily  hydrOXYzine (VISTARIL) 25 MG capsule, Take 25 mg by mouth 2 times daily  sertraline (ZOLOFT) 100 MG tablet, Take 100 mg by mouth daily  naltrexone (DEPADE) 50 MG tablet, Take 50 mg by mouth daily  spironolactone (ALDACTONE) 50 MG tablet, Take 50 mg by mouth daily          Allergies   Allergen Reactions    Ace Inhibitors Anaphylaxis     \"Throat and Mouth Swelled UP\"  Angioedema   Angioedema       Ibuprofen      Stomach ulcers    Pcn [Penicillins] Itching    Pork (Porcine) Protein      Buddhist        Social History     Tobacco Use    Smoking status: Current Every Day Smoker     Packs/day: 0.50     Years: 20.00     Pack years: 10.00     Types: Cigarettes    Smokeless tobacco: Never Used   Substance Use Topics    Alcohol use: Yes     Comment:  a day         History reviewed. No pertinent family history.      Review of Systems  Pertinent items are noted in HPI. Objective:     Blood pressure (!) 169/94, pulse 59, temperature 98 °F (36.7 °C), temperature source Oral, resp. rate 16, height 5' 10\" (1.778 m), weight 238 lb 1.6 oz (108 kg), SpO2 98 %. General appearance: alert, cooperative, no distress  Head: Normocephalic, without obvious abnormality, anicteric  Lungs: clear to auscultation bilaterally  Heart: regular rate and rhythm  Abdomen: soft, non-tender. No rebound/guarding, Bowel sounds normal. No palpable masses. Extremities: extremities normal, atraumatic, no cyanosis or edema  Skin: warm and dry, no jaundice, no pallor  Neuro: intact    Data Review:    Recent Labs     01/01/21  0814   WBC 6.3   HGB 13.0   HCT 39.4   MCV 93.6        Recent Labs     01/01/21  0814 01/03/21  0931   * 133*   K 3.8 3.9    102   CO2 20* 20*   BUN 12 13   CREATININE 0.7 1.1     Recent Labs     01/01/21  0814 01/03/21  0931   AST 16 16   ALT 9* 9*   BILITOT 0.3 <0.2   ALKPHOS 64 86     No results for input(s): LIPASE, AMYLASE in the last 72 hours. Recent Labs     01/01/21 0814 01/03/21  0931   PROT 6.3* 7.2     No results for input(s): PTT in the last 72 hours. No results for input(s): OCCULTBLD in the last 72 hours.

## 2021-01-03 NOTE — PROGRESS NOTES
Hospitalist Progress Note      PCP: Milly Hernandez MD    Date of Admission: 12/28/2020      Hospital Course:   54 y.o. female presented to [facility] complaining of depression stating that she is going to drive herself into traffic according to the notes everyone would be better without her.  When the nurse walked her to her room she wrapped the blood pressure cord around her neck.  Stated her last drink of alcohol was last nigh,t last used cocaine 2 days ago and fentanyl last week.  Reportedly snorts both substances. she had a bottle of alcohol in her coat jacket. She requested alcohol treatment, acknowledges being a chronic alcoholic, and drank a bottle of vodka before coming to the ER.  Complained of abdominal pain, has history of gastric ulcer. U tox positive cocaine. acetaminophen and salicylate negative, EtOH 12, LFT normal mild thrombocytopenia platelet 376.  CP54 negative, UA + nitrite, small LE, 343 WBC consistent with acute urinary tract infection.  Beta-hCG negative. CT abdomen shows stranding left perinephric or pyelonephritis. Given rocephin IVPB and 2L IV NS bolus in ER.       Admitted inpatient status with 1:1 sitter. Treated IV rocephin for pyelonephritis, hydration IV  NS. Placed on CIWA protocol. Seen by psychiatry who recommends inpatient psych placement when medically cleared. 12/31 - low grade fever today, abx switched to Levaquin    1/1 - having right sided abdominal pain, intermittent, 8/10, with food making the pain better    1/2 - still with right sided pain, now says much worse with eating. Stated that when the ultrasound tech was pressing the probe in the RUQ, she was having pain. Subjective:    Patient seen and examined. Abdominal pain persists. No other complaints.     Medications:  Reviewed    Infusion Medications   Scheduled Medications    levofloxacin  750 mg Intravenous Q24H    sucralfate  1 g Oral 4x Daily AC & HS    pantoprazole  40 mg Oral BID AC

## 2021-01-04 ENCOUNTER — ANESTHESIA (OUTPATIENT)
Dept: ENDOSCOPY | Age: 46
End: 2021-01-04

## 2021-01-04 ENCOUNTER — ANESTHESIA EVENT (OUTPATIENT)
Dept: ENDOSCOPY | Age: 46
End: 2021-01-04

## 2021-01-04 VITALS — DIASTOLIC BLOOD PRESSURE: 55 MMHG | SYSTOLIC BLOOD PRESSURE: 92 MMHG | OXYGEN SATURATION: 99 %

## 2021-01-04 LAB — HCG(URINE) PREGNANCY TEST: NEGATIVE

## 2021-01-04 PROCEDURE — 3700000001 HC ADD 15 MINUTES (ANESTHESIA): Performed by: INTERNAL MEDICINE

## 2021-01-04 PROCEDURE — 6360000002 HC RX W HCPCS: Performed by: INTERNAL MEDICINE

## 2021-01-04 PROCEDURE — 1200000000 HC SEMI PRIVATE

## 2021-01-04 PROCEDURE — 6370000000 HC RX 637 (ALT 250 FOR IP): Performed by: INTERNAL MEDICINE

## 2021-01-04 PROCEDURE — 2580000003 HC RX 258: Performed by: HOSPITALIST

## 2021-01-04 PROCEDURE — 84703 CHORIONIC GONADOTROPIN ASSAY: CPT

## 2021-01-04 PROCEDURE — 6360000002 HC RX W HCPCS: Performed by: NURSE ANESTHETIST, CERTIFIED REGISTERED

## 2021-01-04 PROCEDURE — 2709999900 HC NON-CHARGEABLE SUPPLY: Performed by: INTERNAL MEDICINE

## 2021-01-04 PROCEDURE — 0DJ08ZZ INSPECTION OF UPPER INTESTINAL TRACT, VIA NATURAL OR ARTIFICIAL OPENING ENDOSCOPIC: ICD-10-PCS | Performed by: INTERNAL MEDICINE

## 2021-01-04 PROCEDURE — 6360000002 HC RX W HCPCS: Performed by: ANESTHESIOLOGY

## 2021-01-04 PROCEDURE — 7100000000 HC PACU RECOVERY - FIRST 15 MIN: Performed by: INTERNAL MEDICINE

## 2021-01-04 PROCEDURE — 2580000003 HC RX 258: Performed by: INTERNAL MEDICINE

## 2021-01-04 PROCEDURE — 2500000003 HC RX 250 WO HCPCS: Performed by: NURSE ANESTHETIST, CERTIFIED REGISTERED

## 2021-01-04 PROCEDURE — 6370000000 HC RX 637 (ALT 250 FOR IP): Performed by: HOSPITALIST

## 2021-01-04 PROCEDURE — 6360000002 HC RX W HCPCS: Performed by: HOSPITALIST

## 2021-01-04 PROCEDURE — 94760 N-INVAS EAR/PLS OXIMETRY 1: CPT

## 2021-01-04 PROCEDURE — 3700000000 HC ANESTHESIA ATTENDED CARE: Performed by: INTERNAL MEDICINE

## 2021-01-04 PROCEDURE — 6370000000 HC RX 637 (ALT 250 FOR IP): Performed by: NURSE PRACTITIONER

## 2021-01-04 PROCEDURE — 99024 POSTOP FOLLOW-UP VISIT: CPT | Performed by: SURGERY

## 2021-01-04 PROCEDURE — 3609017100 HC EGD: Performed by: INTERNAL MEDICINE

## 2021-01-04 PROCEDURE — 7100000001 HC PACU RECOVERY - ADDTL 15 MIN: Performed by: INTERNAL MEDICINE

## 2021-01-04 PROCEDURE — 2580000003 HC RX 258: Performed by: NURSE ANESTHETIST, CERTIFIED REGISTERED

## 2021-01-04 RX ORDER — SODIUM CHLORIDE 0.9 % (FLUSH) 0.9 %
10 SYRINGE (ML) INJECTION PRN
Status: DISCONTINUED | OUTPATIENT
Start: 2021-01-04 | End: 2021-01-07 | Stop reason: HOSPADM

## 2021-01-04 RX ORDER — PROPOFOL 10 MG/ML
INJECTION, EMULSION INTRAVENOUS CONTINUOUS PRN
Status: DISCONTINUED | OUTPATIENT
Start: 2021-01-04 | End: 2021-01-04 | Stop reason: SDUPTHER

## 2021-01-04 RX ORDER — LABETALOL HYDROCHLORIDE 5 MG/ML
5 INJECTION, SOLUTION INTRAVENOUS EVERY 10 MIN PRN
Status: DISCONTINUED | OUTPATIENT
Start: 2021-01-04 | End: 2021-01-04

## 2021-01-04 RX ORDER — PROPOFOL 10 MG/ML
INJECTION, EMULSION INTRAVENOUS PRN
Status: DISCONTINUED | OUTPATIENT
Start: 2021-01-04 | End: 2021-01-04 | Stop reason: SDUPTHER

## 2021-01-04 RX ORDER — SODIUM CHLORIDE 9 MG/ML
INJECTION, SOLUTION INTRAVENOUS CONTINUOUS PRN
Status: DISCONTINUED | OUTPATIENT
Start: 2021-01-04 | End: 2021-01-04 | Stop reason: SDUPTHER

## 2021-01-04 RX ORDER — GLYCOPYRROLATE 0.2 MG/ML
INJECTION INTRAMUSCULAR; INTRAVENOUS PRN
Status: DISCONTINUED | OUTPATIENT
Start: 2021-01-04 | End: 2021-01-04 | Stop reason: SDUPTHER

## 2021-01-04 RX ORDER — PROMETHAZINE HYDROCHLORIDE 25 MG/ML
6.25 INJECTION, SOLUTION INTRAMUSCULAR; INTRAVENOUS
Status: DISCONTINUED | OUTPATIENT
Start: 2021-01-04 | End: 2021-01-04

## 2021-01-04 RX ORDER — ONDANSETRON 2 MG/ML
4 INJECTION INTRAMUSCULAR; INTRAVENOUS
Status: COMPLETED | OUTPATIENT
Start: 2021-01-04 | End: 2021-01-04

## 2021-01-04 RX ORDER — LIDOCAINE HYDROCHLORIDE 20 MG/ML
INJECTION, SOLUTION INFILTRATION; PERINEURAL PRN
Status: DISCONTINUED | OUTPATIENT
Start: 2021-01-04 | End: 2021-01-04 | Stop reason: SDUPTHER

## 2021-01-04 RX ORDER — SODIUM CHLORIDE 0.9 % (FLUSH) 0.9 %
10 SYRINGE (ML) INJECTION EVERY 12 HOURS SCHEDULED
Status: DISCONTINUED | OUTPATIENT
Start: 2021-01-04 | End: 2021-01-07 | Stop reason: HOSPADM

## 2021-01-04 RX ORDER — PANTOPRAZOLE SODIUM 40 MG/1
40 TABLET, DELAYED RELEASE ORAL
Status: DISCONTINUED | OUTPATIENT
Start: 2021-01-05 | End: 2021-01-07 | Stop reason: HOSPADM

## 2021-01-04 RX ADMIN — THIAMINE HCL TAB 100 MG 100 MG: 100 TAB at 14:33

## 2021-01-04 RX ADMIN — LEVOFLOXACIN 750 MG: 5 INJECTION, SOLUTION INTRAVENOUS at 15:44

## 2021-01-04 RX ADMIN — GABAPENTIN 300 MG: 300 CAPSULE ORAL at 19:59

## 2021-01-04 RX ADMIN — GLYCOPYRROLATE 0.1 MG: 0.2 INJECTION, SOLUTION INTRAMUSCULAR; INTRAVENOUS at 13:00

## 2021-01-04 RX ADMIN — FOLIC ACID 1 MG: 1 TABLET ORAL at 14:32

## 2021-01-04 RX ADMIN — GABAPENTIN 300 MG: 300 CAPSULE ORAL at 10:52

## 2021-01-04 RX ADMIN — HYDROXYZINE HYDROCHLORIDE 10 MG: 10 TABLET, FILM COATED ORAL at 10:53

## 2021-01-04 RX ADMIN — PROPOFOL 80 MG: 10 INJECTION, EMULSION INTRAVENOUS at 13:04

## 2021-01-04 RX ADMIN — ATENOLOL 100 MG: 50 TABLET ORAL at 07:22

## 2021-01-04 RX ADMIN — LORAZEPAM 1 MG: 2 INJECTION INTRAMUSCULAR; INTRAVENOUS at 19:56

## 2021-01-04 RX ADMIN — ESCITALOPRAM OXALATE 10 MG: 10 TABLET ORAL at 14:32

## 2021-01-04 RX ADMIN — SODIUM CHLORIDE, PRESERVATIVE FREE 10 ML: 5 INJECTION INTRAVENOUS at 07:16

## 2021-01-04 RX ADMIN — LIDOCAINE HYDROCHLORIDE 5 ML: 20 INJECTION, SOLUTION INFILTRATION; PERINEURAL at 13:04

## 2021-01-04 RX ADMIN — SODIUM CHLORIDE: 9 INJECTION, SOLUTION INTRAVENOUS at 13:00

## 2021-01-04 RX ADMIN — HYDROXYZINE HYDROCHLORIDE 10 MG: 10 TABLET, FILM COATED ORAL at 23:29

## 2021-01-04 RX ADMIN — PROPOFOL 50 MG: 10 INJECTION, EMULSION INTRAVENOUS at 13:12

## 2021-01-04 RX ADMIN — LORAZEPAM 1 MG: 2 INJECTION INTRAMUSCULAR; INTRAVENOUS at 14:34

## 2021-01-04 RX ADMIN — MIRTAZAPINE 7.5 MG: 15 TABLET, FILM COATED ORAL at 19:59

## 2021-01-04 RX ADMIN — SPIRONOLACTONE 50 MG: 25 TABLET ORAL at 07:22

## 2021-01-04 RX ADMIN — LORAZEPAM 1 MG: 2 INJECTION INTRAMUSCULAR; INTRAVENOUS at 07:17

## 2021-01-04 RX ADMIN — ONDANSETRON 4 MG: 2 INJECTION INTRAMUSCULAR; INTRAVENOUS at 13:44

## 2021-01-04 RX ADMIN — Medication 10 ML: at 17:17

## 2021-01-04 RX ADMIN — SODIUM CHLORIDE, PRESERVATIVE FREE 10 ML: 5 INJECTION INTRAVENOUS at 19:57

## 2021-01-04 RX ADMIN — HYDROXYZINE HYDROCHLORIDE 10 MG: 10 TABLET, FILM COATED ORAL at 03:12

## 2021-01-04 RX ADMIN — PROPOFOL 200 MCG/KG/MIN: 10 INJECTION, EMULSION INTRAVENOUS at 13:04

## 2021-01-04 ASSESSMENT — PULMONARY FUNCTION TESTS
PIF_VALUE: 0
PIF_VALUE: 1
PIF_VALUE: 0

## 2021-01-04 ASSESSMENT — PAIN - FUNCTIONAL ASSESSMENT: PAIN_FUNCTIONAL_ASSESSMENT: 0-10

## 2021-01-04 NOTE — H&P
Pre-operative History and Physical    Patient: Marii Strange  : 1975  Acct#:     Intended Procedure:  EGD    HISTORY OF PRESENT ILLNESS:  The patient is a 54 y.o. female  who presents for EGD due to abdominal pain. Past Medical History:        Diagnosis Date    Gastric ulcer     Hypertension     Suicidal ideation      Past Surgical History:        Procedure Laterality Date     SECTION      ENDOMETRIAL ABLATION      GASTRIC BYPASS SURGERY       Medications Prior to Admission:   No current facility-administered medications on file prior to encounter. Current Outpatient Medications on File Prior to Encounter   Medication Sig Dispense Refill    buprenorphine-naloxone (SUBOXONE) 8-2 MG SUBL SL tablet 1 tablet daily.  gabapentin (NEURONTIN) 300 MG capsule       hydroCHLOROthiazide (HYDRODIURIL) 12.5 MG tablet       lansoprazole (PREVACID) 30 MG capsule Take 30 mg by mouth daily      ibuprofen (ADVIL;MOTRIN) 600 MG tablet Take 1 tablet by mouth every 6 hours as needed for Pain 30 tablet 0    acetaminophen (TYLENOL) 325 MG tablet Take 2 tablets by mouth every 6 hours as needed for Pain 30 tablet 0    sucralfate (CARAFATE) 1 GM/10ML suspension Take 10 mLs by mouth 4 times daily (before meals and nightly) for 10 days 400 mL 0    oxyCODONE-acetaminophen (PERCOCET) 5-325 MG per tablet Take 1 tablet by mouth every 8 hours as needed for Pain 10 tablet 0    atenolol (TENORMIN) 100 MG tablet Take 100 mg by mouth daily      hydrOXYzine (VISTARIL) 25 MG capsule Take 25 mg by mouth 2 times daily      sertraline (ZOLOFT) 100 MG tablet Take 100 mg by mouth daily      naltrexone (DEPADE) 50 MG tablet Take 50 mg by mouth daily      spironolactone (ALDACTONE) 50 MG tablet Take 50 mg by mouth daily          Allergies:  Ace inhibitors, Ibuprofen, Pcn [penicillins], and Pork (porcine) protein    Social History:   TOBACCO:   reports that she has been smoking cigarettes.  She has a 10.00 pack-year smoking history. She has never used smokeless tobacco.  ETOH:   reports current alcohol use. DRUGS:   reports current drug use. Drugs: Cocaine, Marijuana, and Opiates . PHYSICAL EXAM:      Vital Signs: BP (!) 144/93   Pulse 62   Temp 97.7 °F (36.5 °C) (Oral)   Resp 16   Ht 5' 10\" (1.778 m)   Wt 238 lb 1.6 oz (108 kg)   SpO2 98%   BMI 34.16 kg/m²    Airway: No stridor or wheezing noted. Good air movement  Pulmonary: without wheezes. Clear to auscultation  Cardiac:regular rate and rhythm without loud murmurs  Abdomen:soft, nontender,  Bowel sounds present    Pre-Procedure Assessment / Plan:  1) EGD    ASA Grade:  ASA 2 - Patient with mild systemic disease with no functional limitations  Mallampati Classification:  Class II    Level of Sedation Plan:MAC    Post Procedure plan: Return to same level of care    I assessed the patient and find that the patient is in satisfactory condition to proceed with the planned procedure and sedation plan. I have explained the risk, benefits, and alternatives to the procedure; the patient understands and agrees to proceed.        Kristi Trevino MD  1/4/2021

## 2021-01-04 NOTE — PROGRESS NOTES
Hospitalist Progress Note      PCP: Lory Castleman, MD    Date of Admission: 12/28/2020      Hospital Course:   54 y.o. female presented to [facility] complaining of depression stating that she is going to drive herself into traffic according to the notes everyone would be better without her.  When the nurse walked her to her room she wrapped the blood pressure cord around her neck.  Stated her last drink of alcohol was last nigh,t last used cocaine 2 days ago and fentanyl last week.  Reportedly snorts both substances. she had a bottle of alcohol in her coat jacket. She requested alcohol treatment, acknowledges being a chronic alcoholic, and drank a bottle of vodka before coming to the ER.  Complained of abdominal pain, has history of gastric ulcer. U tox positive cocaine. acetaminophen and salicylate negative, EtOH 12, LFT normal mild thrombocytopenia platelet 657.  MS57 negative, UA + nitrite, small LE, 343 WBC consistent with acute urinary tract infection.  Beta-hCG negative. CT abdomen shows stranding left perinephric or pyelonephritis. Given rocephin IVPB and 2L IV NS bolus in ER.       Admitted inpatient status with 1:1 sitter. Treated IV rocephin for pyelonephritis, hydration IV  NS. Placed on CIWA protocol. Seen by psychiatry who recommends inpatient psych placement when medically cleared. 12/31 - low grade fever today, abx switched to Levaquin    1/1 - having right sided abdominal pain, intermittent, 8/10, with food making the pain better    1/2 - still with right sided pain, now says much worse with eating. Stated that when the ultrasound tech was pressing the probe in the RUQ, she was having pain. 1/3 - Patient seen and examined. Abdominal pain persists. No other complaints. Subjective:    Patient seen and examined. Same abdominal pain. Going for EGD today.     Medications:  Reviewed    Infusion Medications   Scheduled Medications    levofloxacin  750 mg Intravenous Q24H  sucralfate  1 g Oral 4x Daily AC & HS    pantoprazole  40 mg Oral BID AC    escitalopram  10 mg Oral Daily    nicotine  1 patch Transdermal Daily    mirtazapine  7.5 mg Oral Nightly    atenolol  100 mg Oral Daily    gabapentin  300 mg Oral BID    spironolactone  50 mg Oral Daily    sodium chloride flush  10 mL Intravenous 2 times per day    thiamine mononitrate  100 mg Oral Daily    multivitamin  1 tablet Oral Daily    folic acid  1 mg Oral Daily     PRN Meds: LORazepam, hydrOXYzine, potassium chloride **OR** potassium alternative oral replacement, magnesium sulfate, acetaminophen, sodium chloride flush, promethazine **OR** ondansetron, polyethylene glycol      Intake/Output Summary (Last 24 hours) at 1/4/2021 0948  Last data filed at 1/3/2021 2020  Gross per 24 hour   Intake 730 ml   Output --   Net 730 ml       Physical Exam Performed:    BP (!) 144/93   Pulse 62   Temp 97.7 °F (36.5 °C) (Oral)   Resp 16   Ht 5' 10\" (1.778 m)   Wt 238 lb 1.6 oz (108 kg)   SpO2 98%   BMI 34.16 kg/m²       General appearance: No apparent distress, alert and cooperative. HEENT: Conjunctivae/corneas clear, neck supple w/ full ROM  Respiratory:  Normal respiratory effort. Clear to auscultation, bilaterally without Rales/Wheezes/Rhonchi. Cardiovascular: Regular rate and rhythm, normal S1/S2, no murmurs  Abdomen: Soft, RUQ ttp, negative Powers's sign, non-distended with normal bowel sounds. Musculoskeletal: No edema bilaterally, mild low back bilateral ttp  Neurologic:  No new focal deficits  Psychiatric: Alert and oriented, normal insight  Capillary Refill: Brisk,< 3 seconds   Peripheral Pulses: +2 palpable, equal bilaterally       Labs:   No results for input(s): WBC, HGB, HCT, PLT in the last 72 hours.   Recent Labs     01/03/21  0931   *   K 3.9      CO2 20*   BUN 13   CREATININE 1.1   CALCIUM 9.8     Recent Labs     01/03/21  0931   AST 16   ALT 9*   BILITOT <0.2   ALKPHOS 86     No results for input(s): INR in the last 72 hours. No results for input(s): Emmy Brady in the last 72 hours. Urinalysis:      Lab Results   Component Value Date    NITRU POSITIVE 12/28/2020    WBCUA 343 12/28/2020    BACTERIA 4+ 12/28/2020    RBCUA 4 12/28/2020    BLOODU MODERATE 12/28/2020    SPECGRAV >=1.030 12/28/2020    GLUCOSEU Negative 12/28/2020       Radiology:  US GALLBLADDER RUQ   Final Result   1. Cholelithiasis without evidence of acute cholecystitis. CT ABDOMEN PELVIS W IV CONTRAST Additional Contrast? None   Final Result   1. Acute left pyelonephritis; correlate with urinalysis.          XR CHEST PORTABLE   Final Result   No active cardiopulmonary disease                 Assessment/Plan:    Active Hospital Problems    Diagnosis    Pyelonephritis [N12]    Alcohol withdrawal syndrome with complication ([facility] [address] [H63.220]    Suicidal ideation [R45.851]     Left Pyelonephritis  - continue IV Levaquin  - repeat urine culture positive for yeast only  - will give dose of PO fluconazole    Post prandial abdominal pain  - repeat lfts   - US showed cholelithiasis but no evidence of cholecystitis  - general surgery consulted; they consulted GI for EGD to evaluate for marginal ulcers with possible future lap julieth with cholangiogram  Suicidal ideation - improved  - psychiatry consulted  - no longer meets criteria for inpatient psych and sitter discontinued    PTSD  Cocaine abuse  - would benefit from drug rehab program  - previously on subutex, recommend OP f/u    Alcohol dependence  - no in withdrawal  - ativn prn for anxiety      DVT Prophylaxis: scd  Diet: Diet NPO Effective Now  Code Status: Full Code      Dispo - continue care, await EGD/possible lap julieth    Osiel Yates MD

## 2021-01-04 NOTE — OP NOTE
Endoscopy Note    Patient: Yun Cary  : 1975  Acct#:     Procedure: Esophagogastroduodenoscopy                          Date:  2021     Surgeon:   Anthony John MD    Referring Physician:  Rachael Preciado MD    Indications: This is a 54 y.o. female  who presents for EGD due to abdominal pain. Postoperative Diagnosis:    1. Healthy appearing Arpit-en-Y gastric bypass  2. Otherwise normal EGD    Anesthesia:  MAC    Consent:  The patient or their legal guardian has signed an informed consent, and is aware of the potential risks, benefits, alternatives, and potential complications of this procedure. These include, but are not limited to hemorrhage, bleeding, post procedural pain, perforation, phlebitis, aspiration, hypotension, hypoxia, cardiovascular events such as arryhthmia, and possibly death. Description of Procedure: The patient was then taken to the endoscopy suite, placed in the left lateral decubitus position and the above IV sedation was administrered. The Olympus video endoscope was placed through the patient's oropharynx without difficulty to the extent of the maximum extent within reach of the Arpit limb of the jejunum. Both forward and retroflexed views of the stomach were obtained. Findings:    Esophagus: The esophagus appeared normal without evidence of Snyder's esophagus or reflux esophagitis. The Z line was located 39 cm from the incisors, and was irregular, without evidence of Snyder's or esophagitis. Stomach: The stomach was notable for prior Arpit-en-Y gastric bypass. A healthy appearing Arpit-en-Y gastric bypass anastomosis was present, without ulceration. Small bowel: Normal appearing small bowel mucosa, without ulceration or erythema    The scope was then withdrawn back into the stomach, it was decompressed, and the scope was completely withdrawn.     The patient tolerated the procedure well and was taken to the post anesthesia care unit in good condition. Estimated Blood Loss: None    Impression:   1) See post procedure diagnoses    Recommendations:   - Return to the hospital [facility] for ongoing care. - Resume regular medications. - Resume diet as tolerated. - Recommend cholecystectomy per general surgery team.    Thank you for allowing me to participate in this patient's care. No further GI work-up needed at this time. We will sign off, however please contact us with any additional questions at [phone].       NISHA Luevano [address] [facility]

## 2021-01-04 NOTE — PROGRESS NOTES
Shift assessment completed. Pt noted to be tearful earlier in shift. RN spoke with pt in great lengths regarding \"life. \"  Pt discussing trying to change and making better life choices. RN encouraged pt that it was a good idea. Pt did become tearful. PRN ativan administered and effective. Pt now resting in bed quietly without complaints. Will monitor.

## 2021-01-04 NOTE — PROGRESS NOTES
Patient admitted to PACU # 14 from OR at 1323 post EGD DIAGNOSTIC ONLY   per Dr. Bobbi Hampton. Attached to PACU monitoring system and report received from anesthesia provider. Patient was reported to be hemodynamically stable during procedure. Patient drowsy on admission and denied pain.

## 2021-01-04 NOTE — PROGRESS NOTES
General Surgery  Daily Progress Note    Pt Name: Claudia [facility] Record Number: [id number]  Date of Birth 1975   Today's Date: 1/4/2021    Chief Complaint   Patient presents with    Suicidal     states she doesnt feel well. she states she will drive into traffic     Fever     103.2 in er        ASSESSMENT/PLAN  1. Post prandial abdominal pain - hx of gastric bypass along with hx of ulcers. Cholelithiasis on US. EGD with GI negative for marginal ulcers. Will proceed with laparoscopic cholecystectomy with cholangiogram, possible open tomorrow. The risks, benefits, and alternatives were discussed with the patient and she is willing to consent for the procedure. OK for low fat diet. NPO after midnight. Will stop carafate and make PPI daily. 2. Left pyelonephritis - on levaquin per hospitalist, continue      Luís [place] has slightly improved from yesterday. Denies any abdominal pain currently. OBJECTIVE  VITALS:  height is 5' 10\" (1.778 m) and weight is 238 lb 1.6 oz (108 kg). Her oral temperature is 98.1 °F (36.7 °C). Her blood pressure is 135/97 (abnormal) and her pulse is 51. Her respiration is 16 and oxygen saturation is 99%. GENERAL: alert, no distress  LUNGS: normal respiratory effort, no accessory muscle use  HEART: normal rate and regular rhythm  ABDOMEN: soft, ND, NT  EXTREMITY: no cyanosis and no clubbing  I/O last 3 completed shifts:   In: 1459 [P.O.:1080; I.V.:10]  Out: -   I/O this shift:  In: 250 [I.V.:250]  Out: 450 [Urine:450]    LABS  Recent Labs     01/03/21  0931   *   K 3.9      CO2 20*   BUN 13   CREATININE 1.1   CALCIUM 9.8   AST 16   ALT 9*   BILITOT <0.2     CBC with Differential:    Lab Results   Component Value Date    WBC 6.3 01/01/2021    RBC 4.21 01/01/2021    HGB 13.0 01/01/2021    HCT 39.4 01/01/2021     01/01/2021    MCV 93.6 01/01/2021    MCH 30.8 01/01/2021    MCHC 32.9 01/01/2021    RDW 14.9 01/01/2021    NRBC 0 08/20/2019 SEGSPCT 44.6 08/20/2019    LYMPHOPCT 35.7 01/01/2021    MONOPCT 18.1 01/01/2021    BASOPCT 0.7 01/01/2021    MONOSABS 1.1 01/01/2021    LYMPHSABS 2.3 01/01/2021    EOSABS 0.2 01/01/2021    BASOSABS 0.0 01/01/2021    DIFFTYPE Auto-K 06/07/2013     BMP:    Lab Results   Component Value Date     01/03/2021    K 3.9 01/03/2021     01/03/2021    CO2 20 01/03/2021    BUN 13 01/03/2021    LABALBU 3.5 01/03/2021    CREATININE 1.1 01/03/2021    CALCIUM 9.8 01/03/2021    GFRAA >60 01/03/2021    GFRAA >60 06/07/2013    LABGLOM 53 01/03/2021    GLUCOSE 182 01/03/2021     Magnesium:    Lab Results   Component Value Date    MG 1.80 12/30/2020     Phosphorus:  No results found for: Alexandro Prieto MD  Electronically signed 1/4/2021 at 2:57 PM

## 2021-01-04 NOTE — PROGRESS NOTES
PACU Transfer Note    Vitals:    01/04/21 1350   BP: 113/71   Pulse: (!) 47   Resp: 15   Temp: 98 °F (36.7 °C)   SpO2: 95%       In: 250 [I.V.:250]  Out: 450 [Urine:450]    Pain assessment:  none  Pain Level: 0    Report given to Receiving unit Donna PROCTOR.    1/4/2021 1:56 PM

## 2021-01-04 NOTE — ANESTHESIA POSTPROCEDURE EVALUATION
[facility] Department of Anesthesiology  Post-Anesthesia Note       Name:  Katt Devine                                  Age:  54 y.o. MRN:  [id number]     Last Vitals & Oxygen Saturation: /85   Pulse (!) 49   Temp 98 °F (36.7 °C) (Oral)   Resp 16   Ht 5' 10\" (1.778 m)   Wt 238 lb 1.6 oz (108 kg)   SpO2 95%   BMI 34.16 kg/m²   Patient Vitals for the past 4 hrs:   BP Temp Temp src Pulse Resp SpO2   01/04/21 1520 139/85 98 °F (36.7 °C) Oral (!) 49 16 95 %   01/04/21 1411 (!) 135/97 98.1 °F (36.7 °C) Oral 51 16 99 %   01/04/21 1350 113/71 98 °F (36.7 °C) Temporal (!) 47 15 95 %   01/04/21 1340 135/86   (!) 48 13 96 %   01/04/21 1335 121/84   (!) 48 17 99 %   01/04/21 1330 117/81   50 20 97 %   01/04/21 1325 113/77   52 22 96 %   01/04/21 1323 (!) 125/102 97.8 °F (36.6 °C) Temporal 50 30 94 %   01/04/21 1239 123/89 98.1 °F (36.7 °C) Temporal 53 16 96 %       Level of consciousness:  Awake, alert    Respiratory: Respirations easy, no distress. Stable. Cardiovascular: Hemodynamically stable. Hydration: Adequate. PONV: Adequately managed. Post-op pain: Adequately controlled. Post-op assessment: Tolerated anesthetic well without complication. Complications:  None.     Anya Corral MD  January 4, 2021   4:19 PM

## 2021-01-04 NOTE — PROGRESS NOTES
Alert and oriented. Agreeable to procedure.   Electronically signed by Pedro Angela RN on 1/4/2021 at 12:43 PM

## 2021-01-04 NOTE — PROGRESS NOTES
Alert and oriented. Agreeable to procedure.   Electronically signed by Wadell Leyden, RN on 1/4/2021 at 12:20 PM

## 2021-01-04 NOTE — PROGRESS NOTES
Food items and drinks removed from bedside table, states has not had anything to eat or drink since 1130 pm last night, medicated with Ativan, bp meds given now.

## 2021-01-04 NOTE — CARE COORDINATION
Call placed to [facility], in [facility] where pt stated she wants to go. They are NOT taking any outside insurance patients at this time. This pateint has [facility]. They are a behavioral unit as well as drug, alcohol rehab but she will NOT be able to go there.   [place], [place]     Case Management   [phone]    1/4/2021  8:51 AM

## 2021-01-04 NOTE — PROGRESS NOTES
Comprehensive Nutrition Assessment    Type and Reason for Visit:  Initial    Nutrition Recommendations/Plan:     Continue NPO, with diet advancement when medically indicated    Nutrition Assessment:  LOS. Pt here with suicidal thoughts and alcohol withdrawal. Also found to have pyelonephritis. Per psych, pt no longer with suicidal thoughts. Pt currently NPO. Was eating well prior to being NPO but chart does show that pt has been having some abdominal pain following food/drink consumption. Plan is for EGD today with possible lap julieth tomorrow d/t cholelithiasis. Pt currently at low nutritonal risk d/t eating well prior to being NPO, report of wt gain over 9 months, and no wounds. Please consult RD if nutritional status changes. PMHx includes gastic bypass surgery, gastric ulcer, HTN, tobacco use, alcohol abuse, substance abuse. Malnutrition Assessment:  Malnutrition Status:  No malnutrition      Due to current CDC guidelines recommending 6-ft distancing for social isolation for COVID19 prevention, NFPE/malnutrition assessment was deferred at this time. Nutrition Related Findings:  BM 1/3; no edema; labs as of 1/3: Na low at 133, glucose elevated at 182      Wounds:  None       Current Nutrition Therapies:    Diet NPO Effective Now    Anthropometric Measures:  · Height: 5' 10\" (177.8 cm)  · Current Body Weight: 238 lb (108 kg)   · Admission Body Weight: 233 lb (105.7 kg)(actual)    · Usual Body Weight: (per EMR, pt reports 20 lb wt gain x 9 mo d/t pandemic)     · Ideal Body Weight: 150 lbs  · BMI: 34.1  · Adjusted Body Weight:  ; No Adjustment   · BMI Categories: Obese Class 1 (BMI 30.0-34. 9)       Nutrition Diagnosis:   No nutrition diagnosis at this time     Nutrition Interventions:   Food and/or Nutrient Delivery:  Continue NPO(with diet advancement as indicated)  Nutrition Education/Counseling:  No recommendation at this time   Coordination of Nutrition Care:  Continue to monitor while inpatient    Goals:  Consume >50% of meals once diet advanced       Nutrition Monitoring and Evaluation:   Behavioral-Environmental Outcomes:  None Identified   Food/Nutrient Intake Outcomes:  Food and Nutrient Intake, Diet Advancement/Tolerance  Physical Signs/Symptoms Outcomes:  Biochemical Data, Nausea or Vomiting, Weight, Meal Time Behavior     Discharge Planning:     Too soon to determine     Electronically signed by Chaitanya Whitlock RD, LD on 1/4/21 at 12:10 PM EST    Contact: [phone]

## 2021-01-04 NOTE — CARE COORDINATION
PHILLIPW reviewed chart. Pt to have EDG today with possible lap julieht. Per chart, she no longer is eligible for inpt psych. She has stated she wants to get to [facility] at some point. ANTON following for DC needs.   [place], [place]     Case Management   [phone]    1/4/2021  8:45 AM

## 2021-01-05 ENCOUNTER — ANESTHESIA (OUTPATIENT)
Dept: OPERATING ROOM | Age: 46
DRG: 951 | End: 2021-01-05
Payer: COMMERCIAL

## 2021-01-05 ENCOUNTER — ANESTHESIA EVENT (OUTPATIENT)
Dept: OPERATING ROOM | Age: 46
DRG: 951 | End: 2021-01-05
Payer: COMMERCIAL

## 2021-01-05 ENCOUNTER — APPOINTMENT (OUTPATIENT)
Dept: GENERAL RADIOLOGY | Age: 46
DRG: 951 | End: 2021-01-05
Payer: COMMERCIAL

## 2021-01-05 VITALS
RESPIRATION RATE: 28 BRPM | OXYGEN SATURATION: 93 % | TEMPERATURE: 96.4 F | DIASTOLIC BLOOD PRESSURE: 98 MMHG | SYSTOLIC BLOOD PRESSURE: 134 MMHG

## 2021-01-05 LAB
A/G RATIO: 0.8 (ref 1.1–2.2)
ALBUMIN SERPL-MCNC: 3.4 G/DL (ref 3.4–5)
ALP BLD-CCNC: 84 U/L (ref 40–129)
ALT SERPL-CCNC: 9 U/L (ref 10–40)
ANION GAP SERPL CALCULATED.3IONS-SCNC: 10 MMOL/L (ref 3–16)
AST SERPL-CCNC: 16 U/L (ref 15–37)
BASOPHILS ABSOLUTE: 0.1 K/UL (ref 0–0.2)
BASOPHILS RELATIVE PERCENT: 0.7 %
BILIRUB SERPL-MCNC: <0.2 MG/DL (ref 0–1)
BUN BLDV-MCNC: 17 MG/DL (ref 7–20)
CALCIUM SERPL-MCNC: 9.4 MG/DL (ref 8.3–10.6)
CHLORIDE BLD-SCNC: 99 MMOL/L (ref 99–110)
CO2: 24 MMOL/L (ref 21–32)
CREAT SERPL-MCNC: 1.1 MG/DL (ref 0.6–1.1)
EOSINOPHILS ABSOLUTE: 0.3 K/UL (ref 0–0.6)
EOSINOPHILS RELATIVE PERCENT: 3.2 %
GFR AFRICAN AMERICAN: >60
GFR NON-AFRICAN AMERICAN: 53
GLOBULIN: 4.2 G/DL
GLUCOSE BLD-MCNC: 96 MG/DL (ref 70–99)
HCT VFR BLD CALC: 40.9 % (ref 36–48)
HEMOGLOBIN: 13.6 G/DL (ref 12–16)
LYMPHOCYTES ABSOLUTE: 3.3 K/UL (ref 1–5.1)
LYMPHOCYTES RELATIVE PERCENT: 40.7 %
MCH RBC QN AUTO: 30.9 PG (ref 26–34)
MCHC RBC AUTO-ENTMCNC: 33.2 G/DL (ref 31–36)
MCV RBC AUTO: 93.2 FL (ref 80–100)
MONOCYTES ABSOLUTE: 0.8 K/UL (ref 0–1.3)
MONOCYTES RELATIVE PERCENT: 9.4 %
NEUTROPHILS ABSOLUTE: 3.7 K/UL (ref 1.7–7.7)
NEUTROPHILS RELATIVE PERCENT: 46 %
PDW BLD-RTO: 14.7 % (ref 12.4–15.4)
PLATELET # BLD: 358 K/UL (ref 135–450)
PMV BLD AUTO: 8.8 FL (ref 5–10.5)
POTASSIUM REFLEX MAGNESIUM: 4.5 MMOL/L (ref 3.5–5.1)
RBC # BLD: 4.39 M/UL (ref 4–5.2)
SODIUM BLD-SCNC: 133 MMOL/L (ref 136–145)
TOTAL PROTEIN: 7.6 G/DL (ref 6.4–8.2)
WBC # BLD: 8 K/UL (ref 4–11)

## 2021-01-05 PROCEDURE — 85025 COMPLETE CBC W/AUTO DIFF WBC: CPT

## 2021-01-05 PROCEDURE — 6360000002 HC RX W HCPCS: Performed by: INTERNAL MEDICINE

## 2021-01-05 PROCEDURE — 3600000014 HC SURGERY LEVEL 4 ADDTL 15MIN: Performed by: SURGERY

## 2021-01-05 PROCEDURE — 6370000000 HC RX 637 (ALT 250 FOR IP): Performed by: SURGERY

## 2021-01-05 PROCEDURE — BF121ZZ FLUOROSCOPY OF GALLBLADDER USING LOW OSMOLAR CONTRAST: ICD-10-PCS | Performed by: SURGERY

## 2021-01-05 PROCEDURE — 3209999900 FLUORO FOR SURGICAL PROCEDURES

## 2021-01-05 PROCEDURE — 6360000002 HC RX W HCPCS: Performed by: NURSE ANESTHETIST, CERTIFIED REGISTERED

## 2021-01-05 PROCEDURE — 2580000003 HC RX 258: Performed by: SURGERY

## 2021-01-05 PROCEDURE — 6360000004 HC RX CONTRAST MEDICATION: Performed by: SURGERY

## 2021-01-05 PROCEDURE — 7100000000 HC PACU RECOVERY - FIRST 15 MIN: Performed by: SURGERY

## 2021-01-05 PROCEDURE — 2580000003 HC RX 258: Performed by: INTERNAL MEDICINE

## 2021-01-05 PROCEDURE — 0FT44ZZ RESECTION OF GALLBLADDER, PERCUTANEOUS ENDOSCOPIC APPROACH: ICD-10-PCS | Performed by: SURGERY

## 2021-01-05 PROCEDURE — 88304 TISSUE EXAM BY PATHOLOGIST: CPT

## 2021-01-05 PROCEDURE — 2500000003 HC RX 250 WO HCPCS

## 2021-01-05 PROCEDURE — 3700000000 HC ANESTHESIA ATTENDED CARE: Performed by: SURGERY

## 2021-01-05 PROCEDURE — 2500000003 HC RX 250 WO HCPCS: Performed by: NURSE ANESTHETIST, CERTIFIED REGISTERED

## 2021-01-05 PROCEDURE — 36415 COLL VENOUS BLD VENIPUNCTURE: CPT

## 2021-01-05 PROCEDURE — 3700000001 HC ADD 15 MINUTES (ANESTHESIA): Performed by: SURGERY

## 2021-01-05 PROCEDURE — 74300 X-RAY BILE DUCTS/PANCREAS: CPT

## 2021-01-05 PROCEDURE — 2709999900 HC NON-CHARGEABLE SUPPLY: Performed by: SURGERY

## 2021-01-05 PROCEDURE — 1200000000 HC SEMI PRIVATE

## 2021-01-05 PROCEDURE — 6360000002 HC RX W HCPCS: Performed by: ANESTHESIOLOGY

## 2021-01-05 PROCEDURE — 7100000001 HC PACU RECOVERY - ADDTL 15 MIN: Performed by: SURGERY

## 2021-01-05 PROCEDURE — 6360000002 HC RX W HCPCS: Performed by: SURGERY

## 2021-01-05 PROCEDURE — 2500000003 HC RX 250 WO HCPCS: Performed by: SURGERY

## 2021-01-05 PROCEDURE — 47563 LAPARO CHOLECYSTECTOMY/GRAPH: CPT | Performed by: SURGERY

## 2021-01-05 PROCEDURE — 6370000000 HC RX 637 (ALT 250 FOR IP): Performed by: INTERNAL MEDICINE

## 2021-01-05 PROCEDURE — 2580000003 HC RX 258: Performed by: NURSE ANESTHETIST, CERTIFIED REGISTERED

## 2021-01-05 PROCEDURE — 80053 COMPREHEN METABOLIC PANEL: CPT

## 2021-01-05 PROCEDURE — 3600000004 HC SURGERY LEVEL 4 BASE: Performed by: SURGERY

## 2021-01-05 RX ORDER — OXYCODONE HYDROCHLORIDE AND ACETAMINOPHEN 5; 325 MG/1; MG/1
1 TABLET ORAL PRN
Status: DISCONTINUED | OUTPATIENT
Start: 2021-01-05 | End: 2021-01-05 | Stop reason: HOSPADM

## 2021-01-05 RX ORDER — FENTANYL CITRATE 50 UG/ML
INJECTION, SOLUTION INTRAMUSCULAR; INTRAVENOUS PRN
Status: DISCONTINUED | OUTPATIENT
Start: 2021-01-05 | End: 2021-01-05 | Stop reason: SDUPTHER

## 2021-01-05 RX ORDER — FENTANYL CITRATE 50 UG/ML
25 INJECTION, SOLUTION INTRAMUSCULAR; INTRAVENOUS EVERY 5 MIN PRN
Status: DISCONTINUED | OUTPATIENT
Start: 2021-01-05 | End: 2021-01-05 | Stop reason: HOSPADM

## 2021-01-05 RX ORDER — OXYCODONE HYDROCHLORIDE 10 MG/1
10 TABLET ORAL EVERY 4 HOURS PRN
Status: DISCONTINUED | OUTPATIENT
Start: 2021-01-05 | End: 2021-01-07 | Stop reason: HOSPADM

## 2021-01-05 RX ORDER — SODIUM CHLORIDE 9 MG/ML
INJECTION, SOLUTION INTRAVENOUS CONTINUOUS PRN
Status: DISCONTINUED | OUTPATIENT
Start: 2021-01-05 | End: 2021-01-05 | Stop reason: SDUPTHER

## 2021-01-05 RX ORDER — PROPOFOL 10 MG/ML
INJECTION, EMULSION INTRAVENOUS PRN
Status: DISCONTINUED | OUTPATIENT
Start: 2021-01-05 | End: 2021-01-05 | Stop reason: SDUPTHER

## 2021-01-05 RX ORDER — ONDANSETRON 2 MG/ML
INJECTION INTRAMUSCULAR; INTRAVENOUS PRN
Status: DISCONTINUED | OUTPATIENT
Start: 2021-01-05 | End: 2021-01-05 | Stop reason: SDUPTHER

## 2021-01-05 RX ORDER — LIDOCAINE HYDROCHLORIDE 20 MG/ML
INJECTION, SOLUTION EPIDURAL; INFILTRATION; INTRACAUDAL; PERINEURAL PRN
Status: DISCONTINUED | OUTPATIENT
Start: 2021-01-05 | End: 2021-01-05 | Stop reason: SDUPTHER

## 2021-01-05 RX ORDER — GLYCOPYRROLATE 0.2 MG/ML
INJECTION INTRAMUSCULAR; INTRAVENOUS PRN
Status: DISCONTINUED | OUTPATIENT
Start: 2021-01-05 | End: 2021-01-05 | Stop reason: SDUPTHER

## 2021-01-05 RX ORDER — OXYCODONE HYDROCHLORIDE AND ACETAMINOPHEN 5; 325 MG/1; MG/1
2 TABLET ORAL PRN
Status: DISCONTINUED | OUTPATIENT
Start: 2021-01-05 | End: 2021-01-05 | Stop reason: HOSPADM

## 2021-01-05 RX ORDER — MORPHINE SULFATE 2 MG/ML
2 INJECTION, SOLUTION INTRAMUSCULAR; INTRAVENOUS
Status: DISCONTINUED | OUTPATIENT
Start: 2021-01-05 | End: 2021-01-07 | Stop reason: HOSPADM

## 2021-01-05 RX ORDER — MIDAZOLAM HYDROCHLORIDE 1 MG/ML
INJECTION INTRAMUSCULAR; INTRAVENOUS PRN
Status: DISCONTINUED | OUTPATIENT
Start: 2021-01-05 | End: 2021-01-05 | Stop reason: SDUPTHER

## 2021-01-05 RX ORDER — ROCURONIUM BROMIDE 10 MG/ML
INJECTION, SOLUTION INTRAVENOUS PRN
Status: DISCONTINUED | OUTPATIENT
Start: 2021-01-05 | End: 2021-01-05 | Stop reason: SDUPTHER

## 2021-01-05 RX ORDER — DEXAMETHASONE SODIUM PHOSPHATE 4 MG/ML
INJECTION, SOLUTION INTRA-ARTICULAR; INTRALESIONAL; INTRAMUSCULAR; INTRAVENOUS; SOFT TISSUE PRN
Status: DISCONTINUED | OUTPATIENT
Start: 2021-01-05 | End: 2021-01-05 | Stop reason: SDUPTHER

## 2021-01-05 RX ORDER — BUPIVACAINE HYDROCHLORIDE 5 MG/ML
INJECTION, SOLUTION EPIDURAL; INTRACAUDAL
Status: COMPLETED | OUTPATIENT
Start: 2021-01-05 | End: 2021-01-05

## 2021-01-05 RX ORDER — ONDANSETRON 2 MG/ML
4 INJECTION INTRAMUSCULAR; INTRAVENOUS
Status: DISCONTINUED | OUTPATIENT
Start: 2021-01-05 | End: 2021-01-05 | Stop reason: HOSPADM

## 2021-01-05 RX ORDER — MORPHINE SULFATE 4 MG/ML
4 INJECTION, SOLUTION INTRAMUSCULAR; INTRAVENOUS
Status: DISCONTINUED | OUTPATIENT
Start: 2021-01-05 | End: 2021-01-07 | Stop reason: HOSPADM

## 2021-01-05 RX ORDER — OXYCODONE HYDROCHLORIDE 5 MG/1
5 TABLET ORAL EVERY 4 HOURS PRN
Status: DISCONTINUED | OUTPATIENT
Start: 2021-01-05 | End: 2021-01-07 | Stop reason: HOSPADM

## 2021-01-05 RX ADMIN — MIDAZOLAM 2 MG: 1 INJECTION INTRAMUSCULAR; INTRAVENOUS at 15:32

## 2021-01-05 RX ADMIN — HYDROMORPHONE HYDROCHLORIDE 0.5 MG: 1 INJECTION, SOLUTION INTRAMUSCULAR; INTRAVENOUS; SUBCUTANEOUS at 18:01

## 2021-01-05 RX ADMIN — THIAMINE HCL TAB 100 MG 100 MG: 100 TAB at 09:51

## 2021-01-05 RX ADMIN — GABAPENTIN 300 MG: 300 CAPSULE ORAL at 20:48

## 2021-01-05 RX ADMIN — PROPOFOL 50 MG: 10 INJECTION, EMULSION INTRAVENOUS at 15:54

## 2021-01-05 RX ADMIN — PANTOPRAZOLE SODIUM 40 MG: 40 TABLET, DELAYED RELEASE ORAL at 06:24

## 2021-01-05 RX ADMIN — ESCITALOPRAM OXALATE 10 MG: 10 TABLET ORAL at 09:51

## 2021-01-05 RX ADMIN — LORAZEPAM 1 MG: 2 INJECTION INTRAMUSCULAR; INTRAVENOUS at 20:51

## 2021-01-05 RX ADMIN — PROPOFOL 150 MG: 10 INJECTION, EMULSION INTRAVENOUS at 15:42

## 2021-01-05 RX ADMIN — LEVOFLOXACIN 750 MG: 5 INJECTION, SOLUTION INTRAVENOUS at 15:18

## 2021-01-05 RX ADMIN — ATENOLOL 100 MG: 50 TABLET ORAL at 09:51

## 2021-01-05 RX ADMIN — SODIUM CHLORIDE: 9 INJECTION, SOLUTION INTRAVENOUS at 15:34

## 2021-01-05 RX ADMIN — ACETAMINOPHEN 650 MG: 325 TABLET ORAL at 11:32

## 2021-01-05 RX ADMIN — DEXAMETHASONE SODIUM PHOSPHATE 8 MG: 4 INJECTION, SOLUTION INTRAMUSCULAR; INTRAVENOUS at 15:52

## 2021-01-05 RX ADMIN — SODIUM CHLORIDE, PRESERVATIVE FREE 10 ML: 5 INJECTION INTRAVENOUS at 10:22

## 2021-01-05 RX ADMIN — GLYCOPYRROLATE 0.2 MG: 0.2 INJECTION, SOLUTION INTRAMUSCULAR; INTRAVENOUS at 15:32

## 2021-01-05 RX ADMIN — GABAPENTIN 300 MG: 300 CAPSULE ORAL at 09:51

## 2021-01-05 RX ADMIN — MIRTAZAPINE 7.5 MG: 15 TABLET, FILM COATED ORAL at 20:50

## 2021-01-05 RX ADMIN — HYDROMORPHONE HYDROCHLORIDE 0.5 MG: 1 INJECTION, SOLUTION INTRAMUSCULAR; INTRAVENOUS; SUBCUTANEOUS at 17:03

## 2021-01-05 RX ADMIN — HYDROMORPHONE HYDROCHLORIDE 0.5 MG: 1 INJECTION, SOLUTION INTRAMUSCULAR; INTRAVENOUS; SUBCUTANEOUS at 17:42

## 2021-01-05 RX ADMIN — ONDANSETRON 4 MG: 2 INJECTION INTRAMUSCULAR; INTRAVENOUS at 15:52

## 2021-01-05 RX ADMIN — FENTANYL CITRATE 50 MCG: 50 INJECTION INTRAMUSCULAR; INTRAVENOUS at 15:51

## 2021-01-05 RX ADMIN — MORPHINE SULFATE 2 MG: 2 INJECTION, SOLUTION INTRAMUSCULAR; INTRAVENOUS at 20:10

## 2021-01-05 RX ADMIN — LORAZEPAM 1 MG: 2 INJECTION INTRAMUSCULAR; INTRAVENOUS at 07:37

## 2021-01-05 RX ADMIN — SPIRONOLACTONE 50 MG: 25 TABLET ORAL at 09:51

## 2021-01-05 RX ADMIN — SODIUM CHLORIDE, PRESERVATIVE FREE 10 ML: 5 INJECTION INTRAVENOUS at 20:55

## 2021-01-05 RX ADMIN — LIDOCAINE HYDROCHLORIDE 100 MG: 20 INJECTION, SOLUTION EPIDURAL; INFILTRATION; INTRACAUDAL; PERINEURAL at 15:42

## 2021-01-05 RX ADMIN — ROCURONIUM BROMIDE 40 MG: 10 INJECTION INTRAVENOUS at 15:42

## 2021-01-05 RX ADMIN — FOLIC ACID 1 MG: 1 TABLET ORAL at 09:51

## 2021-01-05 RX ADMIN — SUGAMMADEX 250 MG: 100 INJECTION, SOLUTION INTRAVENOUS at 16:32

## 2021-01-05 RX ADMIN — HYDROXYZINE HYDROCHLORIDE 10 MG: 10 TABLET, FILM COATED ORAL at 07:37

## 2021-01-05 RX ADMIN — HYDROMORPHONE HYDROCHLORIDE 0.5 MG: 1 INJECTION, SOLUTION INTRAMUSCULAR; INTRAVENOUS; SUBCUTANEOUS at 17:24

## 2021-01-05 RX ADMIN — LORAZEPAM 1 MG: 2 INJECTION INTRAMUSCULAR; INTRAVENOUS at 13:35

## 2021-01-05 RX ADMIN — MORPHINE SULFATE 4 MG: 4 INJECTION, SOLUTION INTRAMUSCULAR; INTRAVENOUS at 22:47

## 2021-01-05 RX ADMIN — FENTANYL CITRATE 50 MCG: 50 INJECTION INTRAMUSCULAR; INTRAVENOUS at 15:53

## 2021-01-05 ASSESSMENT — PAIN DESCRIPTION - ORIENTATION
ORIENTATION: RIGHT
ORIENTATION: RIGHT;LOWER
ORIENTATION: RIGHT;LOWER

## 2021-01-05 ASSESSMENT — PULMONARY FUNCTION TESTS
PIF_VALUE: 9
PIF_VALUE: 17
PIF_VALUE: 6
PIF_VALUE: 21
PIF_VALUE: 16
PIF_VALUE: 21
PIF_VALUE: 21
PIF_VALUE: 1
PIF_VALUE: 26
PIF_VALUE: 20
PIF_VALUE: 23
PIF_VALUE: 21
PIF_VALUE: 9
PIF_VALUE: 20
PIF_VALUE: 6
PIF_VALUE: 20
PIF_VALUE: 23
PIF_VALUE: 19
PIF_VALUE: 15
PIF_VALUE: 23
PIF_VALUE: 0
PIF_VALUE: 20
PIF_VALUE: 0
PIF_VALUE: 22
PIF_VALUE: 21
PIF_VALUE: 20
PIF_VALUE: 18
PIF_VALUE: 19
PIF_VALUE: 20
PIF_VALUE: 30
PIF_VALUE: 16
PIF_VALUE: 16
PIF_VALUE: 10
PIF_VALUE: 19

## 2021-01-05 ASSESSMENT — PAIN SCALES - GENERAL
PAINLEVEL_OUTOF10: 6
PAINLEVEL_OUTOF10: 7
PAINLEVEL_OUTOF10: 8
PAINLEVEL_OUTOF10: 6
PAINLEVEL_OUTOF10: 10
PAINLEVEL_OUTOF10: 8
PAINLEVEL_OUTOF10: 7
PAINLEVEL_OUTOF10: 9

## 2021-01-05 ASSESSMENT — PAIN DESCRIPTION - DESCRIPTORS
DESCRIPTORS: ACHING;SORE
DESCRIPTORS: HEADACHE
DESCRIPTORS: SHARP
DESCRIPTORS: SHARP
DESCRIPTORS: HEADACHE

## 2021-01-05 ASSESSMENT — PAIN DESCRIPTION - LOCATION
LOCATION: HEAD
LOCATION: ABDOMEN

## 2021-01-05 ASSESSMENT — PAIN DESCRIPTION - PROGRESSION
CLINICAL_PROGRESSION: GRADUALLY IMPROVING
CLINICAL_PROGRESSION: NOT CHANGED
CLINICAL_PROGRESSION: NOT CHANGED

## 2021-01-05 ASSESSMENT — PAIN DESCRIPTION - FREQUENCY
FREQUENCY: CONTINUOUS

## 2021-01-05 ASSESSMENT — PAIN DESCRIPTION - ONSET
ONSET: ON-GOING
ONSET: AWAKENED FROM SLEEP

## 2021-01-05 ASSESSMENT — PAIN - FUNCTIONAL ASSESSMENT
PAIN_FUNCTIONAL_ASSESSMENT: PREVENTS OR INTERFERES SOME ACTIVE ACTIVITIES AND ADLS
PAIN_FUNCTIONAL_ASSESSMENT: PREVENTS OR INTERFERES SOME ACTIVE ACTIVITIES AND ADLS
PAIN_FUNCTIONAL_ASSESSMENT: 0-10
PAIN_FUNCTIONAL_ASSESSMENT: PREVENTS OR INTERFERES SOME ACTIVE ACTIVITIES AND ADLS
PAIN_FUNCTIONAL_ASSESSMENT: ACTIVITIES ARE NOT PREVENTED

## 2021-01-05 ASSESSMENT — PAIN DESCRIPTION - PAIN TYPE
TYPE: SURGICAL PAIN
TYPE: ACUTE PAIN
TYPE: SURGICAL PAIN

## 2021-01-05 ASSESSMENT — ENCOUNTER SYMPTOMS: SHORTNESS OF BREATH: 0

## 2021-01-05 ASSESSMENT — LIFESTYLE VARIABLES: SMOKING_STATUS: 1

## 2021-01-05 NOTE — PROGRESS NOTES
Patient admitted to PACU from OR. Patient drowsy, opens eyes to name. Resp easy unlabored on room air O2 with SAO2 91%. Patient placed on [facility] with increase in SAO2 to 98%. Abdominal surgical glue dressings x 4 with 1 small stab surgical glue dressing. Patient C/O surgical pain at 8 of 10. IV patent to left hand. VSS. Moving all extremities to command. Repositioned up in bed for comfort.

## 2021-01-05 NOTE — CARE COORDINATION
LSW spoke with patient over the phone to introduce  role and to discuss dc planning. She reports she lost her home on May, 2020 and she has been couch surfing between friend and a daughter that drinks. She reports she realizes she cannot have Alcohol any more. She reports she also has drug issues as well and she wants treatment. She is to have surgery today. LSW will assist her in finding spots when medically stable.   Rajwinder Andrade, [place]     Case Management   [phone]    1/5/2021  1:09 PM

## 2021-01-05 NOTE — PROGRESS NOTES
Pain level 6 of 10 and tolerable. Patient dozing off and on,opens eyes to name. Oriented. Resp easy unlabored on 2LNC with SaO2 99%. VSS. IV patent. Patient  stable to transfer to room 3254.

## 2021-01-05 NOTE — ANESTHESIA POSTPROCEDURE EVALUATION
Department of Anesthesiology  Postprocedure Note    Patient: Dhaval Gordon  MRN: [id number]  YOB: 1975  Date of evaluation: 1/5/2021  Time:  5:47 PM     Procedure Summary     Date: 01/05/21 Room / Location: [address] [place] [address]    Anesthesia Start: 4690 Anesthesia Stop: 6607    Procedure: LAPAROSCOPIC CHOLECYSTECTOMY WITH CHOLANGIOGRAM, (N/A Abdomen) Diagnosis:       Symptomatic cholelithiasis      (SYMPTOMATIC CHOLELITHIASIS)    Surgeons: Zoe Fisher MD Responsible Provider: Madeleine Marmolejo MD    Anesthesia Type: general ASA Status: 3          Anesthesia Type: general    Faustino Phase I: Faustino Score: 8    Faustino Phase II:      Last vitals: Reviewed and per EMR flowsheets.        Anesthesia Post Evaluation    Patient location during evaluation: PACU  Patient participation: complete - patient participated  Level of consciousness: awake and alert  Airway patency: patent  Nausea & Vomiting: no nausea and no vomiting  Complications: no  Cardiovascular status: hemodynamically stable  Respiratory status: acceptable  Hydration status: stable

## 2021-01-05 NOTE — PROGRESS NOTES
LYMPHSABS 3.3 01/05/2021    EOSABS 0.3 01/05/2021    BASOSABS 0.1 01/05/2021    DIFFTYPE Auto-K 06/07/2013     BMP:    Lab Results   Component Value Date     01/05/2021    K 4.5 01/05/2021    CL 99 01/05/2021    CO2 24 01/05/2021    BUN 17 01/05/2021    LABALBU 3.4 01/05/2021    CREATININE 1.1 01/05/2021    CALCIUM 9.4 01/05/2021    GFRAA >60 01/05/2021    GFRAA >60 06/07/2013    LABGLOM 53 01/05/2021    GLUCOSE 96 01/05/2021     Magnesium:    Lab Results   Component Value Date    MG 1.80 12/30/2020     Phosphorus:  No results found for: Fe Ramirez MD  Electronically signed 1/5/2021 at 3:16 PM

## 2021-01-05 NOTE — PROGRESS NOTES
Patient C/O surgical pain at 10 of 10 and medicated per order. See MAR.  VSS. IV patent. Resp easy unlabored on 2LNC with SAO2 97%.

## 2021-01-05 NOTE — PROGRESS NOTES
Attempted to see patient multiple times and she is out of room for a procedure. Will see patient tomorrow. No new changes from IM perspective.     Lili Zaidi MD, MPH  Hospitalist  Pager: [phone]

## 2021-01-05 NOTE — ANESTHESIA PRE PROCEDURE
[facility] Department of Anesthesiology  Pre-Anesthesia Evaluation/Consultation       Name:  Brenda Albert  : 1975  Age:  54 y.o. MRN:  [id number]  Date: 2021           Surgeon: Surgeon(s):  Juvencio Nicole MD    Procedure: Procedure(s):  LAPAROSCOPIC CHOLECYSTECTOMY WITH CHOLANGIOGRAM, POSSIBLE OPEN     Allergies   Allergen Reactions    Ace Inhibitors Anaphylaxis     \"Throat and Mouth Swelled UP\"  Angioedema   Angioedema       Ibuprofen      Stomach ulcers    Pcn [Penicillins] Itching    Pork (Porcine) Protein      Hinduism     Patient Active Problem List   Diagnosis    Alcohol withdrawal syndrome with complication ([address]    Suicidal ideation    Pyelonephritis     Past Medical History:   Diagnosis Date    Gastric ulcer     Hypertension     Suicidal ideation      Past Surgical History:   Procedure Laterality Date     SECTION      ENDOMETRIAL ABLATION      GASTRIC BYPASS SURGERY      UPPER GASTROINTESTINAL ENDOSCOPY N/A 2021    EGD DIAGNOSTIC ONLY performed by Nnamdi Osborne MD at [address] [place] [address] History     Tobacco Use    Smoking status: Current Every Day Smoker     Packs/day: 0.50     Years: 20.00     Pack years: 10.00     Types: Cigarettes    Smokeless tobacco: Never Used   Substance Use Topics    Alcohol use: Yes     Comment: 5th a day     Drug use: Yes     Types: Cocaine, Marijuana, Opiates      Comment: snorts      Medications  No current facility-administered medications on file prior to encounter. Current Outpatient Medications on File Prior to Encounter   Medication Sig Dispense Refill    buprenorphine-naloxone (SUBOXONE) 8-2 MG SUBL SL tablet 1 tablet daily.        gabapentin (NEURONTIN) 300 MG capsule       hydroCHLOROthiazide (HYDRODIURIL) 12.5 MG tablet       lansoprazole (PREVACID) 30 MG capsule Take 30 mg by mouth daily  ibuprofen (ADVIL;MOTRIN) 600 MG tablet Take 1 tablet by mouth every 6 hours as needed for Pain 30 tablet 0    acetaminophen (TYLENOL) 325 MG tablet Take 2 tablets by mouth every 6 hours as needed for Pain 30 tablet 0    sucralfate (CARAFATE) 1 GM/10ML suspension Take 10 mLs by mouth 4 times daily (before meals and nightly) for 10 days 400 mL 0    oxyCODONE-acetaminophen (PERCOCET) 5-325 MG per tablet Take 1 tablet by mouth every 8 hours as needed for Pain 10 tablet 0    atenolol (TENORMIN) 100 MG tablet Take 100 mg by mouth daily      hydrOXYzine (VISTARIL) 25 MG capsule Take 25 mg by mouth 2 times daily      sertraline (ZOLOFT) 100 MG tablet Take 100 mg by mouth daily      naltrexone (DEPADE) 50 MG tablet Take 50 mg by mouth daily      spironolactone (ALDACTONE) 50 MG tablet Take 50 mg by mouth daily       Current Facility-Administered Medications   Medication Dose Route Frequency Provider Last Rate Last Admin    sodium chloride flush 0.9 % injection 10 mL  10 mL Intravenous 2 times per day Antonia Lazo MD   10 mL at 01/05/21 1022    sodium chloride flush 0.9 % injection 10 mL  10 mL Intravenous PRN Antonia Lazo MD   10 mL at 01/04/21 1717    pantoprazole (PROTONIX) tablet 40 mg  40 mg Oral QAM AC Dalila Leon MD   40 mg at 01/05/21 0624    levoFLOXacin (LEVAQUIN) 750 MG/150ML infusion 750 mg  750 mg Intravenous Q24H Antonia Lazo MD   Stopped at 01/04/21 1714    LORazepam (ATIVAN) injection 1 mg  1 mg Intravenous Q4H PRN Antonia Lazo MD   1 mg at 01/05/21 1335    hydrOXYzine (ATARAX) tablet 10 mg  10 mg Oral 4x Daily PRN Antonia Lazo MD   10 mg at 01/05/21 0737    escitalopram (LEXAPRO) tablet 10 mg  10 mg Oral Daily Antonia Lazo MD   10 mg at 01/05/21 0951    nicotine (NICODERM CQ) 21 MG/24HR 1 patch  1 patch Transdermal Daily Antonia Lazo MD   1 patch at 01/05/21 1022 ABGs No results found for: PHART, PO2ART, CES7MJT, EAL8PVJ, BEART, N5QIGQEI   Type & Screen (If Applicable)  No results found for: LABABO, LABRH                         BMI: Wt Readings from Last 3 Encounters:       NPO Status:   Date of last liquid consumption: 01/04/21   Time of last liquid consumption: 2330   Date of last solid food consumption: 01/04/21      Time of last solid consumption: 2300       Anesthesia Evaluation  Patient summary reviewed no history of anesthetic complications:   Airway: Mallampati: III  TM distance: >3 FB   Neck ROM: full  Mouth opening: > = 3 FB Dental:      Comment: No upper teeth, only 6 remaining lower teeth    Pulmonary: breath sounds clear to auscultation  (+) current smoker    (-) COPD, asthma, shortness of breath and recent URI          Patient did not smoke on day of surgery. Cardiovascular:    (+) hypertension:,     (-) valvular problems/murmurs, past MI, CAD, CABG/stent, dysrhythmias,  angina and  CHF      Rhythm: regular  Rate: normal                    Neuro/Psych:   (+) neuromuscular disease:, psychiatric history:   (-) TIA, CVA and headaches           GI/Hepatic/Renal:   (+) GERD: well controlled, PUD,      (-) hepatitis, liver disease, no renal disease and bowel prep       Endo/Other:        (-) diabetes mellitus, hypothyroidism, hyperthyroidism                ROS comment: ETOH Abdominal:           Vascular:                                        Anesthesia Plan      general     ASA 3       Induction: intravenous. MIPS: Postoperative opioids intended and Prophylactic antiemetics administered. Anesthetic plan and risks discussed with patient. Plan discussed with CRNA.             This pre-anesthesia assessment may be used as a history and physical.    DOS STAFF ADDENDUM: Pt seen and examined, chart reviewed (including anesthesia, drug and allergy history). No interval changes to history and physical examination. Anesthetic plan, risks, benefits, alternatives, and personnel involved discussed with patient. Questions and concerns addressed. Patient(family) verbalized an understanding and agrees to proceed.       Coni Almaguer MD  January 5, 2021  2:41 PM

## 2021-01-05 NOTE — OP NOTE
Laparoscopic Cholecystectomy Operative Report      Patient: Monserrat Hernández MRN: [id number]     YOB: 1975  Age: 54 y.o. Sex: female        Primary Care Physician: Anastacia Rodriguez MD         DATE OF OPERATION: 1/5/2021     PREOPERATIVE DIAGNOSIS: symptomatic cholelithiasis    POSTOPERATIVE DIAGNOSIS: same    PROCEDURE PERFORMED: Laparoscopic cholecystectomy with cholangiogram    SURGEON: Ira Marroquin MD    ANESTHESIA: General endotracheal    ASA CLASS: 3    DVT PROPHYLAXIS: bilateral SCDs    ANTIBIOTICS: therapeutic levaquin IV    INDICATIONS: Monserrat Hernández is a 53 yo female with history of gastric bypass who presented with post prandial epigastric and RUQ pain. Due to history of marginal ulcers, she underwent EGD which revealed a normal gastrojejunal anastomosis. She had an ultrasound that showed gallstones. The patient's symptoms were felt to be secondary to gallbladder disease and she presented as an inpatient for removal after the risks, benefits and alternatives were discussed with her. Procedure Details:       After informed consent was obtained, the patient was brought to the operating room and placed on the table in the supine position. The patient was on therapeutic antibiotics. Once under general endotracheal anesthesia, the abdomen was prepped and sterilely draped in the standard fashion. A time-out was performed for patient and procedure verification. A small vertical incision was made just at the umbilicus and a Veress needle inserted into peritoneal cavity. The abdomen was insufflated with carbon dioxide to a pressure of 15 mmHg. A 5mm trocar was introduced and a camera placed. There was no evidence of injury with Veress insertion. The patient did have a moderate amount of omental adhesions with her upper midline incision of her gastric bypass.   Under direct vision, a 10mm port was placed in the subxiphoid location and two 5 mm ports placed in the right upper quadrant. The dome of the gallbladder was grasped and elevated up and over the liver. The patient had no inflammation and few adhesions. We bluntly took down the peritoneum over the infundibulum of the gallbladder. The infundibulum was then grasped and retracted laterally exposing the triangle of Calot. We performed our dissection until the anatomy was very clear as we could see two and only two structures entering the gallbladder and completing our critical view of safety. We were also able to see our distal junction of the cystic duct with the common bile duct. A clip was placed on the gallbladder side of the cystic duct. A small stab incision was made just below the costal margin in the RUQ and the cholangiogram catheter was introduced. A ductotomy was made in the cystic duct and the catheter inserted. Cholangiogram showed contrast flow into the duodenum. Common bile duct, common hepatic duct, cystic duct and junction of the left and right hepatic ducts were well visualized. No filling defects were noted. The catheter was removed. The cystic duct was further cleared circumferentially was clipped proximally with 3 clips and divided. The artery was likewise identified, clipped and divided. The gallbladder was removed from the liver bed with cautery and removed through the epigastric port site. The abdomen was reinspected and found to have good hemostasis. The epigastric trocar site fascia was closed with a figure of eight suture of 0-Vicryl using a laparoscopic suture passer. The trocars were withdrawn and the skin closed with 4-0 Vicryl. Skin affix was applied after injecting local anesthetic. The patient was awoken and extubated without complication. All instrument counts were said to be correct.         Findings: normal cholangiogram, prior adhesions present along upper abdomen from gastric bypass    Estimated Blood Loss: less than 50 ml    Complications: none           Specimen: gallbladder and contents                  Electronically signed by Cecilia Domingo MD on 1/5/2021 at 4:41 PM

## 2021-01-06 LAB
A/G RATIO: 0.8 (ref 1.1–2.2)
ALBUMIN SERPL-MCNC: 3.2 G/DL (ref 3.4–5)
ALP BLD-CCNC: 69 U/L (ref 40–129)
ALT SERPL-CCNC: 13 U/L (ref 10–40)
ANION GAP SERPL CALCULATED.3IONS-SCNC: 15 MMOL/L (ref 3–16)
AST SERPL-CCNC: 25 U/L (ref 15–37)
BILIRUB SERPL-MCNC: <0.2 MG/DL (ref 0–1)
BUN BLDV-MCNC: 16 MG/DL (ref 7–20)
CALCIUM SERPL-MCNC: 9.1 MG/DL (ref 8.3–10.6)
CHLORIDE BLD-SCNC: 99 MMOL/L (ref 99–110)
CO2: 16 MMOL/L (ref 21–32)
CREAT SERPL-MCNC: 1.1 MG/DL (ref 0.6–1.1)
GFR AFRICAN AMERICAN: >60
GFR NON-AFRICAN AMERICAN: 53
GLOBULIN: 4.1 G/DL
GLUCOSE BLD-MCNC: 101 MG/DL (ref 70–99)
GLUCOSE BLD-MCNC: 158 MG/DL (ref 70–99)
GLUCOSE BLD-MCNC: 179 MG/DL (ref 70–99)
GLUCOSE BLD-MCNC: 194 MG/DL (ref 70–99)
GLUCOSE BLD-MCNC: 215 MG/DL (ref 70–99)
PERFORMED ON: ABNORMAL
POTASSIUM REFLEX MAGNESIUM: 4.6 MMOL/L (ref 3.5–5.1)
SODIUM BLD-SCNC: 130 MMOL/L (ref 136–145)
TOTAL PROTEIN: 7.3 G/DL (ref 6.4–8.2)

## 2021-01-06 PROCEDURE — 6370000000 HC RX 637 (ALT 250 FOR IP): Performed by: SURGERY

## 2021-01-06 PROCEDURE — 80053 COMPREHEN METABOLIC PANEL: CPT

## 2021-01-06 PROCEDURE — 36415 COLL VENOUS BLD VENIPUNCTURE: CPT

## 2021-01-06 PROCEDURE — 99231 SBSQ HOSP IP/OBS SF/LOW 25: CPT | Performed by: PSYCHIATRY & NEUROLOGY

## 2021-01-06 PROCEDURE — 1200000000 HC SEMI PRIVATE

## 2021-01-06 PROCEDURE — APPSS15 APP SPLIT SHARED TIME 0-15 MINUTES: Performed by: PHYSICIAN ASSISTANT

## 2021-01-06 PROCEDURE — 6360000002 HC RX W HCPCS: Performed by: SURGERY

## 2021-01-06 PROCEDURE — 6370000000 HC RX 637 (ALT 250 FOR IP): Performed by: INTERNAL MEDICINE

## 2021-01-06 PROCEDURE — 99024 POSTOP FOLLOW-UP VISIT: CPT | Performed by: SURGERY

## 2021-01-06 PROCEDURE — 2580000003 HC RX 258: Performed by: SURGERY

## 2021-01-06 PROCEDURE — APPNB30 APP NON BILLABLE TIME 0-30 MINS: Performed by: PHYSICIAN ASSISTANT

## 2021-01-06 PROCEDURE — 6360000002 HC RX W HCPCS: Performed by: NURSE PRACTITIONER

## 2021-01-06 RX ORDER — DEXTROSE MONOHYDRATE 25 G/50ML
12.5 INJECTION, SOLUTION INTRAVENOUS PRN
Status: DISCONTINUED | OUTPATIENT
Start: 2021-01-06 | End: 2021-01-07 | Stop reason: HOSPADM

## 2021-01-06 RX ORDER — KETOROLAC TROMETHAMINE 15 MG/ML
15 INJECTION, SOLUTION INTRAMUSCULAR; INTRAVENOUS EVERY 8 HOURS
Status: DISCONTINUED | OUTPATIENT
Start: 2021-01-06 | End: 2021-01-07 | Stop reason: HOSPADM

## 2021-01-06 RX ORDER — ESCITALOPRAM OXALATE 10 MG/1
20 TABLET ORAL DAILY
Status: DISCONTINUED | OUTPATIENT
Start: 2021-01-07 | End: 2021-01-07 | Stop reason: HOSPADM

## 2021-01-06 RX ORDER — NICOTINE POLACRILEX 4 MG
15 LOZENGE BUCCAL PRN
Status: DISCONTINUED | OUTPATIENT
Start: 2021-01-06 | End: 2021-01-07 | Stop reason: HOSPADM

## 2021-01-06 RX ORDER — DEXTROSE MONOHYDRATE 50 MG/ML
100 INJECTION, SOLUTION INTRAVENOUS PRN
Status: DISCONTINUED | OUTPATIENT
Start: 2021-01-06 | End: 2021-01-07 | Stop reason: HOSPADM

## 2021-01-06 RX ORDER — KETOROLAC TROMETHAMINE 15 MG/ML
15 INJECTION, SOLUTION INTRAMUSCULAR; INTRAVENOUS EVERY 6 HOURS PRN
Status: DISCONTINUED | OUTPATIENT
Start: 2021-01-06 | End: 2021-01-06 | Stop reason: SDUPTHER

## 2021-01-06 RX ADMIN — SODIUM CHLORIDE, PRESERVATIVE FREE 10 ML: 5 INJECTION INTRAVENOUS at 08:25

## 2021-01-06 RX ADMIN — LEVOFLOXACIN 750 MG: 5 INJECTION, SOLUTION INTRAVENOUS at 16:38

## 2021-01-06 RX ADMIN — MIRTAZAPINE 7.5 MG: 15 TABLET, FILM COATED ORAL at 22:24

## 2021-01-06 RX ADMIN — ATENOLOL 100 MG: 50 TABLET ORAL at 08:25

## 2021-01-06 RX ADMIN — KETOROLAC TROMETHAMINE 15 MG: 15 INJECTION, SOLUTION INTRAMUSCULAR; INTRAVENOUS at 22:24

## 2021-01-06 RX ADMIN — GABAPENTIN 300 MG: 300 CAPSULE ORAL at 22:24

## 2021-01-06 RX ADMIN — LORAZEPAM 1 MG: 2 INJECTION INTRAMUSCULAR; INTRAVENOUS at 22:24

## 2021-01-06 RX ADMIN — OXYCODONE HYDROCHLORIDE 10 MG: 10 TABLET ORAL at 18:51

## 2021-01-06 RX ADMIN — INSULIN LISPRO 1 UNITS: 100 INJECTION, SOLUTION INTRAVENOUS; SUBCUTANEOUS at 22:24

## 2021-01-06 RX ADMIN — SPIRONOLACTONE 50 MG: 25 TABLET ORAL at 08:24

## 2021-01-06 RX ADMIN — GABAPENTIN 300 MG: 300 CAPSULE ORAL at 08:24

## 2021-01-06 RX ADMIN — SODIUM CHLORIDE, PRESERVATIVE FREE 10 ML: 5 INJECTION INTRAVENOUS at 22:35

## 2021-01-06 RX ADMIN — MORPHINE SULFATE 4 MG: 4 INJECTION, SOLUTION INTRAMUSCULAR; INTRAVENOUS at 06:58

## 2021-01-06 RX ADMIN — HYDROXYZINE HYDROCHLORIDE 10 MG: 10 TABLET, FILM COATED ORAL at 08:25

## 2021-01-06 RX ADMIN — THIAMINE HCL TAB 100 MG 100 MG: 100 TAB at 08:25

## 2021-01-06 RX ADMIN — FOLIC ACID 1 MG: 1 TABLET ORAL at 08:24

## 2021-01-06 RX ADMIN — KETOROLAC TROMETHAMINE 15 MG: 15 INJECTION, SOLUTION INTRAMUSCULAR; INTRAVENOUS at 14:36

## 2021-01-06 RX ADMIN — MORPHINE SULFATE 4 MG: 4 INJECTION, SOLUTION INTRAMUSCULAR; INTRAVENOUS at 16:38

## 2021-01-06 RX ADMIN — LORAZEPAM 1 MG: 2 INJECTION INTRAMUSCULAR; INTRAVENOUS at 18:07

## 2021-01-06 RX ADMIN — LORAZEPAM 1 MG: 2 INJECTION INTRAMUSCULAR; INTRAVENOUS at 05:37

## 2021-01-06 RX ADMIN — MORPHINE SULFATE 4 MG: 4 INJECTION, SOLUTION INTRAMUSCULAR; INTRAVENOUS at 09:28

## 2021-01-06 RX ADMIN — PANTOPRAZOLE SODIUM 40 MG: 40 TABLET, DELAYED RELEASE ORAL at 06:59

## 2021-01-06 RX ADMIN — THERA TABS 1 TABLET: TAB at 08:24

## 2021-01-06 RX ADMIN — OXYCODONE HYDROCHLORIDE 10 MG: 10 TABLET ORAL at 14:29

## 2021-01-06 RX ADMIN — MORPHINE SULFATE 4 MG: 4 INJECTION, SOLUTION INTRAMUSCULAR; INTRAVENOUS at 03:38

## 2021-01-06 RX ADMIN — ESCITALOPRAM OXALATE 10 MG: 10 TABLET ORAL at 08:24

## 2021-01-06 ASSESSMENT — PAIN DESCRIPTION - PROGRESSION: CLINICAL_PROGRESSION: GRADUALLY IMPROVING

## 2021-01-06 ASSESSMENT — PAIN SCALES - GENERAL
PAINLEVEL_OUTOF10: 8
PAINLEVEL_OUTOF10: 5
PAINLEVEL_OUTOF10: 8
PAINLEVEL_OUTOF10: 9
PAINLEVEL_OUTOF10: 5

## 2021-01-06 ASSESSMENT — PAIN DESCRIPTION - DESCRIPTORS: DESCRIPTORS: DISCOMFORT;SORE

## 2021-01-06 ASSESSMENT — PAIN DESCRIPTION - LOCATION
LOCATION: ABDOMEN;BACK
LOCATION: ABDOMEN;BACK

## 2021-01-06 ASSESSMENT — PAIN DESCRIPTION - FREQUENCY: FREQUENCY: CONTINUOUS

## 2021-01-06 ASSESSMENT — PAIN - FUNCTIONAL ASSESSMENT
PAIN_FUNCTIONAL_ASSESSMENT: ACTIVITIES ARE NOT PREVENTED
PAIN_FUNCTIONAL_ASSESSMENT: ACTIVITIES ARE NOT PREVENTED

## 2021-01-06 ASSESSMENT — PAIN DESCRIPTION - PAIN TYPE
TYPE: SURGICAL PAIN
TYPE: SURGICAL PAIN

## 2021-01-06 ASSESSMENT — PAIN DESCRIPTION - ONSET: ONSET: ON-GOING

## 2021-01-06 NOTE — PROGRESS NOTES
Hospitalist Progress Note      PCP: Gabo Osborn MD    Date of Admission: 12/28/2020      Hospital Course:   54 y.o. female presented to [facility] complaining of depression stating that she is going to drive herself into traffic according to the notes everyone would be better without her.  When the nurse walked her to her room she wrapped the blood pressure cord around her neck.  Stated her last drink of alcohol was last nigh,t last used cocaine 2 days ago and fentanyl last week.  Reportedly snorts both substances. she had a bottle of alcohol in her coat jacket. She requested alcohol treatment, acknowledges being a chronic alcoholic, and drank a bottle of vodka before coming to the ER.  Complained of abdominal pain, has history of gastric ulcer. U tox positive cocaine. acetaminophen and salicylate negative, EtOH 12, LFT normal mild thrombocytopenia platelet 097.  ZX39 negative, UA + nitrite, small LE, 343 WBC consistent with acute urinary tract infection.  Beta-hCG negative. CT abdomen shows stranding left perinephric or pyelonephritis. Given rocephin IVPB and 2L IV NS bolus in ER.       Admitted inpatient status with 1:1 sitter. Treated IV rocephin for pyelonephritis, hydration IV  NS. Placed on CIWA protocol. Seen by psychiatry who recommends inpatient psych placement when medically cleared. 12/31 - low grade fever today, abx switched to Levaquin    1/1 - having right sided abdominal pain, intermittent, 8/10, with food making the pain better    1/2 - still with right sided pain, now says much worse with eating. Stated that when the ultrasound tech was pressing the probe in the RUQ, she was having pain. 1/3 - Patient seen and examined. Abdominal pain persists. No other complaints. 1/4 -  Same abdominal pain. Going for EGD today. 1/5 - unable to see patient as she was off the floor for lap julieth    Subjective:    Patient seen and examined.   Having some post-op pain but otherwise feels ok. Medications:  Reviewed    Infusion Medications   Scheduled Medications    sodium chloride flush  10 mL Intravenous 2 times per day    pantoprazole  40 mg Oral QAM AC    levofloxacin  750 mg Intravenous Q24H    escitalopram  10 mg Oral Daily    nicotine  1 patch Transdermal Daily    mirtazapine  7.5 mg Oral Nightly    atenolol  100 mg Oral Daily    gabapentin  300 mg Oral BID    spironolactone  50 mg Oral Daily    thiamine mononitrate  100 mg Oral Daily    multivitamin  1 tablet Oral Daily    folic acid  1 mg Oral Daily     PRN Meds: ketorolac, morphine **OR** morphine, oxyCODONE **OR** oxyCODONE, sodium chloride flush, LORazepam, hydrOXYzine, potassium chloride **OR** potassium alternative oral replacement, magnesium sulfate, acetaminophen, promethazine **OR** ondansetron, polyethylene glycol      Intake/Output Summary (Last 24 hours) at 1/6/2021 1228  Last data filed at 1/6/2021 0839  Gross per 24 hour   Intake 1105 ml   Output --   Net 1105 ml       Physical Exam Performed:    /69   Pulse 63   Temp 97.8 °F (36.6 °C) (Axillary)   Resp 16   Ht 5' 10\" (1.778 m)   Wt 238 lb 1.6 oz (108 kg)   SpO2 93%   BMI 34.16 kg/m²       General appearance: No apparent distress, alert and cooperative. HEENT: Conjunctivae/corneas clear, neck supple w/ full ROM  Respiratory:  Normal respiratory effort. Clear to auscultation, bilaterally without Rales/Wheezes/Rhonchi. Cardiovascular: Regular rate and rhythm, normal S1/S2, no murmurs  Abdomen: Soft, surgical incisions c/d/i. negative Powers's sign, non-distended with normal bowel sounds.   Musculoskeletal: No edema bilaterally, mild low back bilateral ttp  Neurologic:  No new focal deficits  Psychiatric: Alert and oriented, normal insight  Capillary Refill: Brisk,< 3 seconds   Peripheral Pulses: +2 palpable, equal bilaterally       Labs:   Recent Labs     01/05/21  0656   WBC 8.0   HGB 13.6   HCT 40.9        Recent Labs 01/05/21  0656 01/06/21  0708   * 130*   K 4.5 4.6   CL 99 99   CO2 24 16*   BUN 17 16   CREATININE 1.1 1.1   CALCIUM 9.4 9.1     Recent Labs     01/05/21  0656 01/06/21  0708   AST 16 25   ALT 9* 13   BILITOT <0.2 <0.2   ALKPHOS 84 69     No results for input(s): INR in the last 72 hours. No results for input(s): Saira Lacks in the last 72 hours. Urinalysis:      Lab Results   Component Value Date    NITRU POSITIVE 12/28/2020    WBCUA 343 12/28/2020    BACTERIA 4+ 12/28/2020    RBCUA 4 12/28/2020    BLOODU MODERATE 12/28/2020    SPECGRAV >=1.030 12/28/2020    GLUCOSEU Negative 12/28/2020       Radiology:  FL CHOLANGIOGRAM OR   Final Result   Unremarkable intraoperative cholangiogram. Correlate with procedural report. US GALLBLADDER RUQ   Final Result   1. Cholelithiasis without evidence of acute cholecystitis. CT ABDOMEN PELVIS W IV CONTRAST Additional Contrast? None   Final Result   1. Acute left pyelonephritis; correlate with urinalysis.          XR CHEST PORTABLE   Final Result   No active cardiopulmonary disease         FLUORO FOR SURGICAL PROCEDURES    (Results Pending)           Assessment/Plan:    Active Hospital Problems    Diagnosis    Pyelonephritis [N12]    Alcohol withdrawal syndrome with complication ([facility] [address] [U68.376]    Suicidal ideation [R45.851]     Left Pyelonephritis  - continue IV Levaquin (Day 10/14)  - repeat urine culture positive for yeast only  - given dose of PO fluconazole    Cholelithiasis s/p laparoscopic cholecystectomy POD#1  - repeat lfts   - US showed cholelithiasis but no evidence of cholecystitis  - diet and pain management per general surgery  - follow-up with general surgery at discharge    Suicidal ideation - improved  - psychiatry consulted  - no longer meets criteria for inpatient psych and sitter discontinued    PTSD  Cocaine abuse  - would benefit from drug rehab program  - previously on subutex, recommend OP f/u    Alcohol dependence  - no in withdrawal  - ativn prn for anxiety      DVT Prophylaxis: scd  Diet: DIET LOW FAT;   Dietary Nutrition Supplements: Other Oral Supplement (see comment)  Code Status: Full Code      Dispo - continue care, discharge likely tomorrow after better pain control    Maribel Morrison MD

## 2021-01-06 NOTE — PROGRESS NOTES
Psych Consult Progress Note    01/06/21      Aimee Ledezma  [id number]  Chief Complaint   Patient presents with    Suicidal     states she doesnt feel well. she states she will drive into traffic     Fever     103.2 in er      I have reviewed recent documentation. Aimee Ledezma is a 54 y.o. female  With  has a past medical history of Gastric ulcer, Hypertension, and Suicidal ideation. Subjective/Interval Hx:  Mood is improved. Still c some anxiety. Feels like meds are helpful. Would like to be on ativan as o/p, but I have noted to her that I think this is unwise, given her addiction issues. Pt interested in a lexapro increase, which I'm happy to do. Quality:  depression  Severity:  mildish  Duration:  Weeks to months. Context:  As above.   Location:  Brain    Objective:  Vitals:    01/06/21 1545   BP: (!) 150/93   Pulse: 56   Resp: 16   Temp: 98.2 °F (36.8 °C)   SpO2: 96%     Recent Results (from the past 168 hour(s))   Culture, Urine    Collection Time: 12/31/20  4:11 PM    Specimen: Urine, clean catch   Result Value Ref Range    Organism Yeast (A)     Urine Culture, Routine 50,000 CFU/ml  No further workup      Comprehensive Metabolic Panel    Collection Time: 01/01/21  8:14 AM   Result Value Ref Range    Sodium 134 (L) 136 - 145 mmol/L    Potassium 3.8 3.5 - 5.1 mmol/L    Chloride 102 99 - 110 mmol/L    CO2 20 (L) 21 - 32 mmol/L    Anion Gap 12 3 - 16    Glucose 90 70 - 99 mg/dL    BUN 12 7 - 20 mg/dL    CREATININE 0.7 0.6 - 1.1 mg/dL    GFR Non-African American >60 >60    GFR African American >60 >60    Calcium 7.9 (L) 8.3 - 10.6 mg/dL    Total Protein 6.3 (L) 6.4 - 8.2 g/dL    Alb 3.0 (L) 3.4 - 5.0 g/dL    Albumin/Globulin Ratio 0.9 (L) 1.1 - 2.2    Total Bilirubin 0.3 0.0 - 1.0 mg/dL    Alkaline Phosphatase 64 40 - 129 U/L    ALT 9 (L) 10 - 40 U/L    AST 16 15 - 37 U/L    Globulin 3.3 g/dL   CBC Auto Differential    Collection Time: 01/01/21  8:14 AM   Result Value Ref Range    WBC 6.3 4.0 - 11.0 K/uL    RBC 4.21 4.00 - 5.20 M/uL    Hemoglobin 13.0 12.0 - 16.0 g/dL    Hematocrit 39.4 36.0 - 48.0 %    MCV 93.6 80.0 - 100.0 fL    MCH 30.8 26.0 - 34.0 pg    MCHC 32.9 31.0 - 36.0 g/dL    RDW 14.9 12.4 - 15.4 %    Platelets 485 767 - 009 K/uL    MPV 10.0 5.0 - 10.5 fL    Neutrophils % 42.0 %    Lymphocytes % 35.7 %    Monocytes % 18.1 %    Eosinophils % 3.5 %    Basophils % 0.7 %    Neutrophils Absolute 2.7 1.7 - 7.7 K/uL    Lymphocytes Absolute 2.3 1.0 - 5.1 K/uL    Monocytes Absolute 1.1 0.0 - 1.3 K/uL    Eosinophils Absolute 0.2 0.0 - 0.6 K/uL    Basophils Absolute 0.0 0.0 - 0.2 K/uL   Comprehensive Metabolic Panel w/ Reflex to MG    Collection Time: 01/03/21  9:31 AM   Result Value Ref Range    Sodium 133 (L) 136 - 145 mmol/L    Potassium reflex Magnesium 3.9 3.5 - 5.1 mmol/L    Chloride 102 99 - 110 mmol/L    CO2 20 (L) 21 - 32 mmol/L    Anion Gap 11 3 - 16    Glucose 182 (H) 70 - 99 mg/dL    BUN 13 7 - 20 mg/dL    CREATININE 1.1 0.6 - 1.1 mg/dL    GFR Non- 53 (A) >60    GFR African American >60 >60    Calcium 9.8 8.3 - 10.6 mg/dL    Total Protein 7.2 6.4 - 8.2 g/dL    Alb 3.5 3.4 - 5.0 g/dL    Albumin/Globulin Ratio 0.9 (L) 1.1 - 2.2    Total Bilirubin <0.2 0.0 - 1.0 mg/dL    Alkaline Phosphatase 86 40 - 129 U/L    ALT 9 (L) 10 - 40 U/L    AST 16 15 - 37 U/L    Globulin 3.7 g/dL   COVID-19    Collection Time: 01/03/21  5:25 PM   Result Value Ref Range    SARS-CoV-2, NAAT Not Detected Not Detected   Pregnancy, Urine    Collection Time: 01/04/21 10:51 AM   Result Value Ref Range    HCG(Urine) Pregnancy Test Negative Detects HCG level >20 MIU/mL   Comprehensive Metabolic Panel w/ Reflex to MG    Collection Time: 01/05/21  6:56 AM   Result Value Ref Range    Sodium 133 (L) 136 - 145 mmol/L    Potassium reflex Magnesium 4.5 3.5 - 5.1 mmol/L    Chloride 99 99 - 110 mmol/L    CO2 24 21 - 32 mmol/L    Anion Gap 10 3 - 16    Glucose 96 70 - 99 mg/dL    BUN 17 7 - 20 mg/dL    CREATININE 1.1 0.6 - 1.1 mg/dL    GFR Non- 53 (A) >60    GFR African American >60 >60    Calcium 9.4 8.3 - 10.6 mg/dL    Total Protein 7.6 6.4 - 8.2 g/dL    Alb 3.4 3.4 - 5.0 g/dL    Albumin/Globulin Ratio 0.8 (L) 1.1 - 2.2    Total Bilirubin <0.2 0.0 - 1.0 mg/dL    Alkaline Phosphatase 84 40 - 129 U/L    ALT 9 (L) 10 - 40 U/L    AST 16 15 - 37 U/L    Globulin 4.2 g/dL   CBC Auto Differential    Collection Time: 01/05/21  6:56 AM   Result Value Ref Range    WBC 8.0 4.0 - 11.0 K/uL    RBC 4.39 4.00 - 5.20 M/uL    Hemoglobin 13.6 12.0 - 16.0 g/dL    Hematocrit 40.9 36.0 - 48.0 %    MCV 93.2 80.0 - 100.0 fL    MCH 30.9 26.0 - 34.0 pg    MCHC 33.2 31.0 - 36.0 g/dL    RDW 14.7 12.4 - 15.4 %    Platelets 134 618 - 292 K/uL    MPV 8.8 5.0 - 10.5 fL    Neutrophils % 46.0 %    Lymphocytes % 40.7 %    Monocytes % 9.4 %    Eosinophils % 3.2 %    Basophils % 0.7 %    Neutrophils Absolute 3.7 1.7 - 7.7 K/uL    Lymphocytes Absolute 3.3 1.0 - 5.1 K/uL    Monocytes Absolute 0.8 0.0 - 1.3 K/uL    Eosinophils Absolute 0.3 0.0 - 0.6 K/uL    Basophils Absolute 0.1 0.0 - 0.2 K/uL   Comprehensive Metabolic Panel w/ Reflex to MG    Collection Time: 01/06/21  7:08 AM   Result Value Ref Range    Sodium 130 (L) 136 - 145 mmol/L    Potassium reflex Magnesium 4.6 3.5 - 5.1 mmol/L    Chloride 99 99 - 110 mmol/L    CO2 16 (L) 21 - 32 mmol/L    Anion Gap 15 3 - 16    Glucose 215 (H) 70 - 99 mg/dL    BUN 16 7 - 20 mg/dL    CREATININE 1.1 0.6 - 1.1 mg/dL    GFR Non- 53 (A) >60    GFR African American >60 >60    Calcium 9.1 8.3 - 10.6 mg/dL    Total Protein 7.3 6.4 - 8.2 g/dL    Alb 3.2 (L) 3.4 - 5.0 g/dL    Albumin/Globulin Ratio 0.8 (L) 1.1 - 2.2    Total Bilirubin <0.2 0.0 - 1.0 mg/dL    Alkaline Phosphatase 69 40 - 129 U/L    ALT 13 10 - 40 U/L    AST 25 15 - 37 U/L    Globulin 4.1 g/dL   POCT Glucose    Collection Time: 01/06/21  4:34 PM   Result Value Ref Range    POC Glucose 101 (H) 70 - 99 mg/dl    Performed on ACCU-CHEK Current Facility-Administered Medications   Medication Dose Route Frequency Provider Last Rate Last Admin    insulin lispro (HUMALOG) injection vial 0-6 Units  0-6 Units Subcutaneous TID WC Tristin Warner MD        insulin lispro (HUMALOG) injection vial 0-3 Units  0-3 Units Subcutaneous Nightly Tristin Warner MD        glucose (GLUTOSE) 40 % oral gel 15 g  15 g Oral PRN Tristin Warner MD        dextrose 50 % IV solution  12.5 g Intravenous PRN Tristin Warner MD        glucagon (rDNA) injection 1 mg  1 mg Intramuscular PRN Tristin Warner MD        dextrose 5 % solution  100 mL/hr Intravenous PRN Tristin Warner MD        ketorolac (TORADOL) injection 15 mg  15 mg Intravenous Q8H JERILYN Connell - CNP   15 mg at 01/06/21 1436    morphine (PF) injection 2 mg  2 mg Intravenous Q2H PRN Cat Alfredo MD   2 mg at 01/05/21 2010    Or    morphine (PF) injection 4 mg  4 mg Intravenous Q2H PRN Cat Alfredo MD   4 mg at 01/06/21 1638    oxyCODONE (ROXICODONE) immediate release tablet 5 mg  5 mg Oral Q4H PRN Cat Alfredo MD        Or   [facility] oxyCODONE HCl (OXY-IR) immediate release tablet 10 mg  10 mg Oral Q4H PRN Cat Alfredo MD   10 mg at 01/06/21 1429    sodium chloride flush 0.9 % injection 10 mL  10 mL Intravenous 2 times per day Cat Alfredo MD   10 mL at 01/06/21 0825    sodium chloride flush 0.9 % injection 10 mL  10 mL Intravenous PRN Cat Alfredo MD   10 mL at 01/04/21 1717    pantoprazole (PROTONIX) tablet 40 mg  40 mg Oral QAM AC Cat Alfredo MD   40 mg at 01/06/21 0659    levoFLOXacin (LEVAQUIN) 750 MG/150ML infusion 750 mg  750 mg Intravenous Q24H Cat Alfredo  mL/hr at 01/06/21 1638 750 mg at 01/06/21 1638    LORazepam (ATIVAN) injection 1 mg  1 mg Intravenous Q4H PRN Cat Alfredo MD   1 mg at 01/06/21 0537    hydrOXYzine (ATARAX) tablet 10 mg  10 mg Oral 4x Daily PRN Sherin Morgan MD   10 mg at 01/06/21 0825    escitalopram (LEXAPRO) tablet 10 mg  10 mg Oral Daily Sherin Morgan MD   10 mg at 01/06/21 0824    nicotine (NICODERM CQ) 21 MG/24HR 1 patch  1 patch Transdermal Daily Sherin Morgan MD   1 patch at 01/06/21 0825    potassium chloride (KLOR-CON M) extended release tablet 40 mEq  40 mEq Oral PRN Sherin Morgan MD   40 mEq at 12/29/20 1430    Or    potassium bicarb-citric acid (EFFER-K) effervescent tablet 40 mEq  40 mEq Oral PRN Sherin Morgan MD        magnesium sulfate 1 g in dextrose 5% 100 mL IVPB  1 g Intravenous PRN Sherin Morgan MD        mirtazapine (REMERON) tablet 7.5 mg  7.5 mg Oral Nightly Sherin Morgan MD   7.5 mg at 01/05/21 2050    acetaminophen (TYLENOL) tablet 650 mg  650 mg Oral Q6H PRN Sherin Morgan MD   650 mg at 01/05/21 1132    atenolol (TENORMIN) tablet 100 mg  100 mg Oral Daily Sherin Morgan MD   100 mg at 01/06/21 0825    gabapentin (NEURONTIN) capsule 300 mg  300 mg Oral BID Sherin Morgan MD   300 mg at 01/06/21 0824    spironolactone (ALDACTONE) tablet 50 mg  50 mg Oral Daily Sherin Morgan MD   50 mg at 01/06/21 0824    promethazine (PHENERGAN) tablet 12.5 mg  12.5 mg Oral Q6H PRN Sherin Morgan MD        Or    ondansetron (ZOFRAN) injection 4 mg  4 mg Intravenous Q6H PRN Sherin Morgan MD        polyethylene glycol (GLYCOLAX) packet 17 g  17 g Oral Daily PRN Sherin Morgan MD        thiamine mononitrate tablet 100 mg  100 mg Oral Daily Sherin Morgan MD   100 mg at 01/06/21 0825    multivitamin 1 tablet  1 tablet Oral Daily Sherin oMrgan MD   1 tablet at 36/24/02 7730    folic acid (FOLVITE) tablet 1 mg  1 mg Oral Daily Sherin Morgan MD   1 mg at 01/06/21 0824       ROS:  No tremor    MSE:  A-in gowns, good Ec, pleasant and engageable  A-more full!   M-much less depressed. S-grossly A + O  I/J-fair/fair  T-linear, goal-directed. No SI. Speech c better r/t/v/a. No resp to int stim. Recs:  1. Depression NOS, suicidality-I'll increase lexapro to 20 today. Referred to [facility]. 2.  Drug abuse-This pt desperately needs sobriety. She is apparently rx'd suboxone as an o/p. She would like to be on it again, though I'm unsure of the rules of rxing it in this setting. I'll leave that to the primary team if you feel it's warranted. She plans on returning to [address] .

## 2021-01-06 NOTE — PROGRESS NOTES
17 16   CREATININE 1.1 1.1   CALCIUM 9.4 9.1   AST 16 25   ALT 9* 13   BILITOT <0.2 <0.2     CBC:   Lab Results   Component Value Date    WBC 8.0 01/05/2021    RBC 4.39 01/05/2021    HGB 13.6 01/05/2021    HCT 40.9 01/05/2021    MCV 93.2 01/05/2021    MCH 30.9 01/05/2021    MCHC 33.2 01/05/2021    RDW 14.7 01/05/2021     01/05/2021    MPV 8.8 01/05/2021     CMP:    Lab Results   Component Value Date     01/06/2021    K 4.6 01/06/2021    CL 99 01/06/2021    CO2 16 01/06/2021    BUN 16 01/06/2021    CREATININE 1.1 01/06/2021    GFRAA >60 01/06/2021    GFRAA >60 06/07/2013    AGRATIO 0.8 01/06/2021    LABGLOM 53 01/06/2021    GLUCOSE 215 01/06/2021    PROT 7.3 01/06/2021    PROT 7.5 02/12/2013    LABALBU 3.2 01/06/2021    CALCIUM 9.1 01/06/2021    BILITOT <0.2 01/06/2021    ALKPHOS 69 01/06/2021    AST 25 01/06/2021    ALT 13 01/06/2021         Rafia Montgomery PA-C  Electronically signed 1/6/2021 at 11:24 AM    Agree with above note. The patient was personally seen and examined. Aimee Ledezma is doing OK. Pain present and somewhat controlled. Tolerating low fat diet.     Abd soft, ND, appropriately tender, incisions c/d/i    WBC 8  Cr 1.1    A/P: symptomatic cholelithiasis s/p laparoscopic cholecystectomy with cholangiogram POD #1    Continue low fat diet  Modify pain medication  Plan for discharge home tomorrow    Dalila Leon MD

## 2021-01-07 VITALS
BODY MASS INDEX: 34.09 KG/M2 | HEART RATE: 77 BPM | SYSTOLIC BLOOD PRESSURE: 125 MMHG | OXYGEN SATURATION: 94 % | RESPIRATION RATE: 16 BRPM | DIASTOLIC BLOOD PRESSURE: 77 MMHG | TEMPERATURE: 97.7 F | WEIGHT: 238.1 LBS | HEIGHT: 70 IN

## 2021-01-07 LAB
A/G RATIO: 1 (ref 1.1–2.2)
ALBUMIN SERPL-MCNC: 3.4 G/DL (ref 3.4–5)
ALP BLD-CCNC: 98 U/L (ref 40–129)
ALT SERPL-CCNC: 16 U/L (ref 10–40)
ANION GAP SERPL CALCULATED.3IONS-SCNC: 13 MMOL/L (ref 3–16)
AST SERPL-CCNC: 23 U/L (ref 15–37)
BILIRUB SERPL-MCNC: <0.2 MG/DL (ref 0–1)
BUN BLDV-MCNC: 26 MG/DL (ref 7–20)
CALCIUM SERPL-MCNC: 9 MG/DL (ref 8.3–10.6)
CHLORIDE BLD-SCNC: 98 MMOL/L (ref 99–110)
CO2: 21 MMOL/L (ref 21–32)
CREAT SERPL-MCNC: 1.2 MG/DL (ref 0.6–1.1)
GFR AFRICAN AMERICAN: 58
GFR NON-AFRICAN AMERICAN: 48
GLOBULIN: 3.5 G/DL
GLUCOSE BLD-MCNC: 117 MG/DL (ref 70–99)
GLUCOSE BLD-MCNC: 196 MG/DL (ref 70–99)
GLUCOSE BLD-MCNC: 94 MG/DL (ref 70–99)
PERFORMED ON: ABNORMAL
PERFORMED ON: NORMAL
POTASSIUM REFLEX MAGNESIUM: 3.7 MMOL/L (ref 3.5–5.1)
SODIUM BLD-SCNC: 132 MMOL/L (ref 136–145)
TOTAL PROTEIN: 6.9 G/DL (ref 6.4–8.2)

## 2021-01-07 PROCEDURE — 6370000000 HC RX 637 (ALT 250 FOR IP): Performed by: SURGERY

## 2021-01-07 PROCEDURE — 6360000002 HC RX W HCPCS: Performed by: NURSE PRACTITIONER

## 2021-01-07 PROCEDURE — 80053 COMPREHEN METABOLIC PANEL: CPT

## 2021-01-07 PROCEDURE — 36415 COLL VENOUS BLD VENIPUNCTURE: CPT

## 2021-01-07 PROCEDURE — 6370000000 HC RX 637 (ALT 250 FOR IP): Performed by: PSYCHIATRY & NEUROLOGY

## 2021-01-07 PROCEDURE — 2580000003 HC RX 258: Performed by: SURGERY

## 2021-01-07 PROCEDURE — 99024 POSTOP FOLLOW-UP VISIT: CPT | Performed by: SURGERY

## 2021-01-07 RX ORDER — OXYCODONE HYDROCHLORIDE AND ACETAMINOPHEN 5; 325 MG/1; MG/1
1 TABLET ORAL EVERY 8 HOURS PRN
Qty: 15 TABLET | Refills: 0 | Status: SHIPPED | OUTPATIENT
Start: 2021-01-07 | End: 2021-01-12

## 2021-01-07 RX ORDER — GAUZE BANDAGE 2" X 2"
100 BANDAGE TOPICAL DAILY
Qty: 30 TABLET | Refills: 0 | Status: SHIPPED | OUTPATIENT
Start: 2021-01-08 | End: 2021-01-07

## 2021-01-07 RX ORDER — NICOTINE 21 MG/24HR
1 PATCH, TRANSDERMAL 24 HOURS TRANSDERMAL DAILY
Qty: 30 PATCH | Refills: 3 | Status: SHIPPED | OUTPATIENT
Start: 2021-01-08

## 2021-01-07 RX ORDER — ESCITALOPRAM OXALATE 20 MG/1
20 TABLET ORAL DAILY
Qty: 30 TABLET | Refills: 3 | Status: SHIPPED | OUTPATIENT
Start: 2021-01-08 | End: 2021-01-07

## 2021-01-07 RX ORDER — HYDROXYZINE PAMOATE 25 MG/1
25 CAPSULE ORAL 2 TIMES DAILY
Qty: 10 CAPSULE | Refills: 0 | Status: SHIPPED | OUTPATIENT
Start: 2021-01-07 | End: 2021-01-12

## 2021-01-07 RX ORDER — LEVOFLOXACIN 750 MG/1
750 TABLET ORAL DAILY
Qty: 4 TABLET | Refills: 0 | Status: SHIPPED | OUTPATIENT
Start: 2021-01-07 | End: 2021-01-11

## 2021-01-07 RX ORDER — MIRTAZAPINE 7.5 MG/1
7.5 TABLET, FILM COATED ORAL NIGHTLY
Qty: 30 TABLET | Refills: 3 | Status: SHIPPED | OUTPATIENT
Start: 2021-01-07 | End: 2021-01-07

## 2021-01-07 RX ORDER — NICOTINE 21 MG/24HR
1 PATCH, TRANSDERMAL 24 HOURS TRANSDERMAL DAILY
Qty: 30 PATCH | Refills: 3 | Status: SHIPPED | OUTPATIENT
Start: 2021-01-08 | End: 2021-01-07

## 2021-01-07 RX ORDER — GAUZE BANDAGE 2" X 2"
100 BANDAGE TOPICAL DAILY
Qty: 30 TABLET | Refills: 0 | Status: SHIPPED | OUTPATIENT
Start: 2021-01-08

## 2021-01-07 RX ORDER — MULTIVITAMIN WITH IRON
1 TABLET ORAL DAILY
Qty: 30 TABLET | Refills: 0 | Status: SHIPPED | OUTPATIENT
Start: 2021-01-08

## 2021-01-07 RX ORDER — MIRTAZAPINE 7.5 MG/1
7.5 TABLET, FILM COATED ORAL NIGHTLY
Qty: 30 TABLET | Refills: 3 | Status: SHIPPED | OUTPATIENT
Start: 2021-01-07

## 2021-01-07 RX ORDER — FOLIC ACID 1 MG/1
1 TABLET ORAL DAILY
Qty: 30 TABLET | Refills: 3 | Status: SHIPPED | OUTPATIENT
Start: 2021-01-08 | End: 2021-01-07

## 2021-01-07 RX ORDER — FOLIC ACID 1 MG/1
1 TABLET ORAL DAILY
Qty: 30 TABLET | Refills: 3 | Status: SHIPPED | OUTPATIENT
Start: 2021-01-08

## 2021-01-07 RX ORDER — HYDROXYZINE PAMOATE 25 MG/1
25 CAPSULE ORAL 2 TIMES DAILY
Qty: 10 CAPSULE | Refills: 0 | Status: SHIPPED | OUTPATIENT
Start: 2021-01-07 | End: 2021-01-07 | Stop reason: SDUPTHER

## 2021-01-07 RX ORDER — ESCITALOPRAM OXALATE 20 MG/1
20 TABLET ORAL DAILY
Qty: 30 TABLET | Refills: 3 | Status: SHIPPED | OUTPATIENT
Start: 2021-01-08

## 2021-01-07 RX ORDER — MULTIVITAMIN WITH IRON
1 TABLET ORAL DAILY
Qty: 30 TABLET | Refills: 0 | Status: SHIPPED | OUTPATIENT
Start: 2021-01-08 | End: 2021-01-07

## 2021-01-07 RX ORDER — LEVOFLOXACIN 750 MG/1
750 TABLET ORAL DAILY
Qty: 4 TABLET | Refills: 0 | Status: SHIPPED | OUTPATIENT
Start: 2021-01-07 | End: 2021-01-07 | Stop reason: SDUPTHER

## 2021-01-07 RX ADMIN — SPIRONOLACTONE 50 MG: 25 TABLET ORAL at 08:26

## 2021-01-07 RX ADMIN — ACETAMINOPHEN 650 MG: 325 TABLET ORAL at 15:17

## 2021-01-07 RX ADMIN — SODIUM CHLORIDE, PRESERVATIVE FREE 10 ML: 5 INJECTION INTRAVENOUS at 08:28

## 2021-01-07 RX ADMIN — FOLIC ACID 1 MG: 1 TABLET ORAL at 08:27

## 2021-01-07 RX ADMIN — OXYCODONE HYDROCHLORIDE 10 MG: 10 TABLET ORAL at 03:44

## 2021-01-07 RX ADMIN — KETOROLAC TROMETHAMINE 15 MG: 15 INJECTION, SOLUTION INTRAMUSCULAR; INTRAVENOUS at 05:31

## 2021-01-07 RX ADMIN — HYDROXYZINE HYDROCHLORIDE 10 MG: 10 TABLET, FILM COATED ORAL at 15:17

## 2021-01-07 RX ADMIN — ESCITALOPRAM OXALATE 20 MG: 10 TABLET ORAL at 08:26

## 2021-01-07 RX ADMIN — HYDROXYZINE HYDROCHLORIDE 10 MG: 10 TABLET, FILM COATED ORAL at 08:26

## 2021-01-07 RX ADMIN — ATENOLOL 100 MG: 50 TABLET ORAL at 08:27

## 2021-01-07 RX ADMIN — THIAMINE HCL TAB 100 MG 100 MG: 100 TAB at 08:26

## 2021-01-07 RX ADMIN — OXYCODONE HYDROCHLORIDE 10 MG: 10 TABLET ORAL at 08:26

## 2021-01-07 RX ADMIN — THERA TABS 1 TABLET: TAB at 08:27

## 2021-01-07 RX ADMIN — GABAPENTIN 300 MG: 300 CAPSULE ORAL at 08:26

## 2021-01-07 RX ADMIN — OXYCODONE HYDROCHLORIDE 10 MG: 10 TABLET ORAL at 12:57

## 2021-01-07 RX ADMIN — PANTOPRAZOLE SODIUM 40 MG: 40 TABLET, DELAYED RELEASE ORAL at 05:31

## 2021-01-07 ASSESSMENT — PAIN SCALES - GENERAL
PAINLEVEL_OUTOF10: 8
PAINLEVEL_OUTOF10: 3
PAINLEVEL_OUTOF10: 3
PAINLEVEL_OUTOF10: 9
PAINLEVEL_OUTOF10: 8
PAINLEVEL_OUTOF10: 8

## 2021-01-07 NOTE — PROGRESS NOTES
Pt with c/o increased anxiety. PRN ativan administered per request.  PIV to left wrist patent and flushes well. Pt requesting turkey sandwich. Will continue to monitor.

## 2021-01-07 NOTE — CARE COORDINATION
[facility] [employer] Unable to fill scripts due to she is locked into [employer]. Bedside RN informed she can call the non narcotic scripts into [employer] and then will need to fax the narcotic. She will do this when [employer] reopens from their lunch brake.   [place], [place]     Case Management   [phone]    1/7/2021  1:45 PM

## 2021-01-07 NOTE — CARE COORDINATION
PHILLIPW spoke with Dr David Shoemaker who reports he will discharge her today. LSW informed him that she had been interested in drug/alcohol treatment. She is willing to go anywhere.   [facility]     Case Management   [phone]    1/7/2021  10:15 AM

## 2021-01-07 NOTE — PROGRESS NOTES
Pt discharged to home. Transportation here with wheelchair. Transported in personal vehicle by her ride. Discharge instructions, Rx, and personal belongings given to pt. Explanation of discharge medications and instructions understood by verbal statement. No questions, comments or concerns at this time.

## 2021-01-07 NOTE — PROGRESS NOTES
General and Vascular Surgery                                                           Daily Progress Note                                                            Pt Name: Claudia [facility] Record Number: [id number]  Date of Birth 1975   Today's Date: 1/7/2021    Chief Complaint: abdominal pain    ASSESSMENT/PLAN  S/P 1/5/21: POD#2: Laparoscopic cholecystectomy with cholangiogram  1. OK to D/C from a surgical standpoint   2. Follow up with Dr. Dustin Anaya in 1-2 weeks. [phone]    EDUCATION  Patient educated about their illness/diagnosis, stated above, and all questions answered. We discussed the importance of nutrition, medications they are taking, and healthy lifestyle. Shubham Mejía is unchanged from yesterday. Pain is well controlled per patient. OBJECTIVE  VITALS:  height is 5' 10\" (1.778 m) and weight is 238 lb 1.6 oz (108 kg). Her oral temperature is 97.7 °F (36.5 °C). Her blood pressure is 125/77 and her pulse is 77. Her respiration is 16 and oxygen saturation is 94%. VITALS:  /77   Pulse 77   Temp 97.7 °F (36.5 °C) (Oral)   Resp 16   Ht 5' 10\" (1.778 m)   Wt 238 lb 1.6 oz (108 kg)   SpO2 94%   BMI 34.16 kg/m²   GENERAL: alert, no distress  ABDOMEN: non-distended and tenderness present- incisional,  without rebound and guarding  I/O last 3 completed shifts:   In: 1570 [P.O.:1560; I.V.:10]  Out: -   I/O this shift:  In: 360 [P.O.:360]  Out: -     LABS  Recent Labs     01/05/21  0656 01/06/21  0708   WBC 8.0  --    HGB 13.6  --    HCT 40.9  --      --    * 130*   K 4.5 4.6   CL 99 99   CO2 24 16*   BUN 17 16   CREATININE 1.1 1.1   CALCIUM 9.4 9.1   AST 16 25   ALT 9* 13   BILITOT <0.2 <0.2     CBC:   Lab Results   Component Value Date    WBC 8.0 01/05/2021    RBC 4.39 01/05/2021    HGB 13.6 01/05/2021    HCT 40.9 01/05/2021    MCV 93.2 01/05/2021    MCH 30.9 01/05/2021    MCHC 33.2 01/05/2021    RDW 14.7 01/05/2021     01/05/2021    MPV 8.8 01/05/2021     CMP:    Lab Results   Component Value Date     01/06/2021    K 4.6 01/06/2021    CL 99 01/06/2021    CO2 16 01/06/2021    BUN 16 01/06/2021    CREATININE 1.1 01/06/2021    GFRAA >60 01/06/2021    GFRAA >60 06/07/2013    AGRATIO 0.8 01/06/2021    LABGLOM 53 01/06/2021    GLUCOSE 215 01/06/2021    PROT 7.3 01/06/2021    PROT 7.5 02/12/2013    LABALBU 3.2 01/06/2021    CALCIUM 9.1 01/06/2021    BILITOT <0.2 01/06/2021    ALKPHOS 69 01/06/2021    AST 25 01/06/2021    ALT 13 01/06/2021     Adia [place] APRN-CNP 10:06 AM 1/7/2021   [facility]  Office: [phone]     Agree with above note. The patient was personally seen and examined. Maribel Shields is doing well. Pain improving. Tolerating low fat diet.     Abd soft, ND, appropriately tender, incisions c/d/i, no erythema or induration    A/P: symptomatic cholelithiasis s/p lap cholecystectomy POD #2, hx of gastric bypass    Continue low fat diet  OK for discharge per surgery  Follow up in 2 weeks, call for appointment    Yg Galvez MD

## 2021-01-07 NOTE — CARE COORDINATION
PHILLIPW went to website:   [email or web address] to obtain a list of various rehab centers. PHILLIPW made several calls; Lázaro wanted to speak directly to her. PHILLIPW transferred the call.   [place], [place]     Case Management   [phone]    1/7/2021  10:31 AM

## 2021-01-07 NOTE — DISCHARGE SUMMARY
Hospital Medicine Discharge Summary    Patient ID: Michael Argueta      Patient's PCP: Katherine Bowers MD    Admit Date: 12/28/2020     Discharge Date:   1/7/2021    Admitting Physician: Mary Wren MD     Discharge Physician: Eric Aguilar MD     Discharge Diagnoses: Active Hospital Problems    Diagnosis Date Noted    Pyelonephritis [N12] 12/29/2020    Alcohol withdrawal syndrome with complication [facility]) [F18.251] 12/28/2020    Suicidal ideation [R45.851] 12/28/2020       The patient was seen and examined on day of discharge and this discharge summary is in conjunction with any daily progress note from day of discharge. Hospital Course: 54 y.o. female who presented to [facility] complaining of depression stating that she is going to drive herself into traffic according to the notes everyone would be better without her. When the nurse walked her to her room she wrapped the blood pressure cord around her neck. Stated her last drink of alcohol was last night last used cocaine 2 days ago and fentanyl last week. Reportedly snorts both substances and she had a bottle of alcohol in her coat jacket. She requested alcohol treatment acknowledges being a chronic alcoholic and drank a bottle of vodka before coming to the ER. Complained of abdominal pain, has history of gastric ulcer  U tox positive cocaine, acetaminophen and salicylate negative, EtOH 12, LFT normal mild thrombocytopenia platelet 882. CD19 negative UA positive nitrite small LE, 343 WBC consistent with acute urinary tract infection. Beta-hCG negative    Admitted inpatient status with 1:1 sitter. Treated IV rocephin for pyelonephritis, hydration IV  NS. Placed on CIWA protocol.  Seen by psychiatry who recommends inpatient psych placement when medically cleared.      12/31 - low grade fever today, abx switched to Levaquin     1/1 - having right sided abdominal pain, intermittent, 8/10, with food making the pain better     1/2 - still with right sided pain, now says much worse with eating. Stated that when the ultrasound tech was pressing the probe in the RUQ, she was having pain.     1/3 - Patient seen and examined. Abdominal pain persists. No other complaints.     1/4 -  Same abdominal pain. Going for EGD today.     1/5 - unable to see patient as she was off the floor for lap julieth    Left Pyelonephritis  - given IV Levaquin, switched to PO Levaquin to complete 14 day course  - repeat urine culture positive for yeast only  - given dose of PO fluconazole     Cholelithiasis s/p laparoscopic cholecystectomy POD#2  - repeat lfts   - US showed cholelithiasis but no evidence of cholecystitis  - diet and pain management per general surgery  - follow-up with general surgery at discharge     Suicidal ideation - improved  - psychiatry consulted  - no longer meets criteria for inpatient psych and sitter discontinued  - meds as prescribed as recommended by psychiatry     PTSD  Cocaine abuse  - would benefit from drug rehab program  - previously on subutex, recommend OP f/u; unable to prescribe at discharge     Alcohol dependence  - no in withdrawal  - ativn prn for anxiety      Exam:     /77   Pulse 77   Temp 97.7 °F (36.5 °C) (Oral)   Resp 16   Ht 5' 10\" (1.778 m)   Wt 238 lb 1.6 oz (108 kg)   SpO2 94%   BMI 34.16 kg/m²       General appearance: No apparent distress, alert and cooperative. HEENT: Conjunctivae/corneas clear, neck supple w/ full ROM  Respiratory:  Normal respiratory effort. Clear to auscultation, bilaterally without Rales/Wheezes/Rhonchi. Cardiovascular: Regular rate and rhythm, normal S1/S2, no murmurs  Abdomen: Soft, surgical incisions c/d/i. negative Powers's sign, non-distended with normal bowel sounds.   Musculoskeletal: No edema bilaterally, mild low back bilateral ttp  Neurologic:  No new focal deficits  Psychiatric: Alert and oriented, normal insight  Capillary Refill: Brisk,< 3 seconds Peripheral Pulses: +2 palpable, equal bilaterally       Labs: For convenience and continuity at follow-up the following most recent labs are provided:      CBC:    Lab Results   Component Value Date    WBC 8.0 01/05/2021    HGB 13.6 01/05/2021    HCT 40.9 01/05/2021     01/05/2021       Renal:    Lab Results   Component Value Date     01/06/2021    K 4.6 01/06/2021    CL 99 01/06/2021    CO2 16 01/06/2021    BUN 16 01/06/2021    CREATININE 1.1 01/06/2021    CALCIUM 9.1 01/06/2021         Significant Diagnostic Studies    Radiology:   FL CHOLANGIOGRAM OR   Final Result   Unremarkable intraoperative cholangiogram. Correlate with procedural report. US GALLBLADDER RUQ   Final Result   1. Cholelithiasis without evidence of acute cholecystitis. CT ABDOMEN PELVIS W IV CONTRAST Additional Contrast? None   Final Result   1. Acute left pyelonephritis; correlate with urinalysis.          XR CHEST PORTABLE   Final Result   No active cardiopulmonary disease         FLUORO FOR SURGICAL PROCEDURES    (Results Pending)          Consults:     IP CONSULT TO HOSPITALIST  IP CONSULT TO SOCIAL WORK  IP CONSULT TO PSYCHIATRY  IP CONSULT TO GENERAL SURGERY  IP CONSULT TO GI    Disposition:  rehab     Condition at Discharge: Stable    Discharge Instructions/Follow-up:  meds as prescribed, follow-up with general surgery    Code Status:  Full Code     Activity: activity as tolerated    Diet: low fat, low cholesterol diet      Discharge Medications:     Current Discharge Medication List           Details   escitalopram (LEXAPRO) 20 MG tablet Take 1 tablet by mouth daily  Qty: 30 tablet, Refills: 3      mirtazapine (REMERON) 7.5 MG tablet Take 1 tablet by mouth nightly  Qty: 30 tablet, Refills: 3      levoFLOXacin (LEVAQUIN) 750 MG tablet Take 1 tablet by mouth daily for 4 days  Qty: 4 tablet, Refills: 0      folic acid (FOLVITE) 1 MG tablet Take 1 tablet by mouth daily  Qty: 30 tablet, Refills: 3      nicotine (NICODERM CQ) 21 MG/24HR Place 1 patch onto the skin daily  Qty: 30 patch, Refills: 3      Multiple Vitamin (MULTIVITAMIN) TABS tablet Take 1 tablet by mouth daily  Qty: 30 tablet, Refills: 0      thiamine mononitrate 100 MG tablet Take 1 tablet by mouth daily  Qty: 30 tablet, Refills: 0              Details   oxyCODONE-acetaminophen (PERCOCET) 5-325 MG per tablet Take 1 tablet by mouth every 8 hours as needed for Pain for up to 5 days. Qty: 15 tablet, Refills: 0    Comments: Reduce doses taken as pain becomes manageable  Associated Diagnoses: Symptomatic cholelithiasis      hydrOXYzine (VISTARIL) 25 MG capsule Take 1 capsule by mouth 2 times daily for 5 days  Qty: 10 capsule, Refills: 0              Details   gabapentin (NEURONTIN) 300 MG capsule       lansoprazole (PREVACID) 30 MG capsule Take 30 mg by mouth daily      ibuprofen (ADVIL;MOTRIN) 600 MG tablet Take 1 tablet by mouth every 6 hours as needed for Pain  Qty: 30 tablet, Refills: 0      acetaminophen (TYLENOL) 325 MG tablet Take 2 tablets by mouth every 6 hours as needed for Pain  Qty: 30 tablet, Refills: 0      sucralfate (CARAFATE) 1 GM/10ML suspension Take 10 mLs by mouth 4 times daily (before meals and nightly) for 10 days  Qty: 400 mL, Refills: 0      atenolol (TENORMIN) 100 MG tablet Take 100 mg by mouth daily      naltrexone (DEPADE) 50 MG tablet Take 50 mg by mouth daily      spironolactone (ALDACTONE) 50 MG tablet Take 50 mg by mouth daily             Time Spent on discharge is more than 30 minutes in the examination, evaluation, counseling and review of medications and discharge plan. Signed:    Chiara Aaron MD   1/7/2021      Thank you Salvador Devlin MD for the opportunity to be involved in this patient's care. If you have any questions or concerns please feel free to contact me at [phone].

## 2021-01-07 NOTE — CARE COORDINATION
Patient has been accepted at [facility] and bed is available on Friday at 10:30 am.  [address]. She needs to bring 6 changes of clothes and her medications. They need fax about Jennifer Good MD notes , list of medications faxed to . LSw spoke wit patient to reiterate they cannot take hre until tomorrow. She reports she does want to go there and she will have to stay with her daughter. LSW asked her how she would get there. She reports she has her car in our parking lot as she had driven here. LSW informed her she would not be able to drive as he is under the influence of narcotic and I will provide a ride to her daughter's house for her. LSw asked her how would she get to [facility] tomorrow. \"I will find a way. \"  Martir Mitchell, [place]     Case Management   [phone]    1/7/2021  11:36 AM

## 2021-01-07 NOTE — DISCHARGE INSTR - COC
Status       Infection Onset Added Last Indicated Last Indicated By Review Planned Expiration Resolved Resolved By    None active    Resolved    COVID-19 Rule Out 01/03/21 01/03/21 01/03/21 COVID-19 (Ordered)   01/03/21 Rule-Out Test Resulted    COVID-19 Rule Out 12/28/20 12/28/20 12/28/20 COVID-19 (Ordered)   12/28/20 Rule-Out Test Resulted            Nurse Assessment:  Last Vital Signs: /77   Pulse 77   Temp 97.7 °F (36.5 °C) (Oral)   Resp 16   Ht 5' 10\" (1.778 m)   Wt 238 lb 1.6 oz (108 kg)   SpO2 94%   BMI 34.16 kg/m²     Last documented pain score (0-10 scale): Pain Level: 8  Last Weight:   Wt Readings from Last 1 Encounters:   01/07/21 238 lb 1.6 oz (108 kg)     Mental Status:  {IP PT MENTAL STATUS:20030:::0}    IV Access:  { SEBAS IV ACCESS:187986101:::0}    Nursing Mobility/ADLs:  Walking   {CHP DME ADLs:269570443:::0}  Transfer  {CHP DME ADLs:955186452:::0}  Bathing  {CHP DME ADLs:881977528:::0}  Dressing  {CHP DME ADLs:233534462:::0}  Toileting  {CHP DME ADLs:803731371:::0}  Feeding  {CHP DME ADLs:417118775:::0}  Med Admin  {CHP DME ADLs:439747038:::0}  Med Delivery   { SEBAS MED Delivery:244759650:::0}    Wound Care Documentation and Therapy:        Elimination:  Continence: Bowel: {YES / AH:23790}  Bladder: {YES / RX:61063}  Urinary Catheter: {Urinary Catheter:697900818:::0}   Colostomy/Ileostomy/Ileal Conduit: {YES / JM:01899}       Date of Last BM: ***    Intake/Output Summary (Last 24 hours) at 1/7/2021 1005  Last data filed at 1/7/2021 0902  Gross per 24 hour   Intake 1450 ml   Output --   Net 1450 ml     I/O last 3 completed shifts:   In: 8250 [P.O.:1560; I.V.:10]  Out: -     Safety Concerns:     [address] SEBAS Safety Concerns:881154209:::0}    Impairments/Disabilities:      [address] SEBAS Impairments/Disabilities:364328978:::0}    Nutrition Therapy:  Current Nutrition Therapy:   [address] SEBAS Diet List:859480299:::0}    Routes of Feeding: {CHP DME Other Feedings:342114631:::0}  Liquids: {Slp liquid thickness:22917}  Daily Fluid Restriction: {CHP DME Yes amt example:391949597:::0}  Last Modified Barium Swallow with Video (Video Swallowing Test): {Done Not Done LAOK:878787480:::5}    Treatments at the Time of Hospital Discharge:   Respiratory Treatments: ***  Oxygen Therapy:  {Therapy; copd oxygen:20536:::0}  Ventilator:    {[facility] Vent List:734357905:::0}    Rehab Therapies: {THERAPEUTIC INTERVENTION:2280930289}  Weight Bearing Status/Restrictions: {[facility] Weight Bearin:::0}  Other Medical Equipment (for information only, NOT a DME order):  {EQUIPMENT:331825341}  Other Treatments: ***    Patient's personal belongings (please select all that are sent with patient):  {CHP DME Belongings:598983226:::0}    RN SIGNATURE:  {Esignature:882148286:::0}    CASE MANAGEMENT/SOCIAL WORK SECTION    Inpatient Status Date: ***    Readmission Risk Assessment Score:  Readmission Risk              Risk of Unplanned Readmission:        11           Discharging to Facility/ Agency   Name:   Address:  Phone:  Fax:    Dialysis Facility (if applicable)   Name:  Address:  Dialysis Schedule:  Phone:  Fax:    / signature: {Esignature:270881068:::0}    PHYSICIAN SECTION    Prognosis: Good    Condition at Discharge: Stable    Rehab Potential (if transferring to Rehab): Good    Recommended Labs or Other Treatments After Discharge: meds as prescribed    Physician Certification: I certify the above information and transfer of Chaka Villatoro  is necessary for the continuing treatment of the diagnosis listed and that she requires [facility] for less 30 days.      Update Admission H&P: No change in H&P    PHYSICIAN SIGNATURE:  Electronically signed by Ling Bonilla MD on 21 at 10:05 AM EST

## 2021-01-13 ENCOUNTER — TELEPHONE (OUTPATIENT)
Dept: SURGERY | Age: 46
End: 2021-01-13

## 2021-01-13 NOTE — TELEPHONE ENCOUNTER
Needs a work note for 2 weeks from 1/11/21-1/25/21. Send to [email or web address]. Is aware that this won't be sent until tomorrow.

## 2023-01-20 RX ORDER — ZOLPIDEM TARTRATE 5 MG/1
5 TABLET ORAL NIGHTLY PRN
COMMUNITY

## 2023-01-20 RX ORDER — DULOXETIN HYDROCHLORIDE 30 MG/1
90 CAPSULE, DELAYED RELEASE ORAL DAILY
COMMUNITY

## 2023-01-20 RX ORDER — VITAMIN B COMPLEX
1 CAPSULE ORAL DAILY
COMMUNITY

## 2023-01-20 RX ORDER — HYDROCHLOROTHIAZIDE 25 MG/1
25 TABLET ORAL DAILY
COMMUNITY

## 2023-01-20 RX ORDER — BUPRENORPHINE HYDROCHLORIDE AND NALOXONE HYDROCHLORIDE DIHYDRATE 8; 2 MG/1; MG/1
1 TABLET SUBLINGUAL DAILY
COMMUNITY

## 2023-01-20 RX ORDER — MULTIVIT WITH MINERALS/LUTEIN
250 TABLET ORAL DAILY
COMMUNITY

## 2023-01-20 NOTE — PROGRESS NOTES
[facility] ENDOSCOPY COLONOSCOPY PRE-OPERATIVE INSTRUCTIONS    Procedure date__1/30/2023_______  Arrival time___1100_________          Surgery time__1200__________       Clear liquids the day before the procedure. Do not eat or drink anything within 5 hours of your procedure. This includes water chewing gum, mints and ice chips. You may brush your teeth and gargle the morning of your surgery, but do not swallow the water    You may be asked to stop blood thinners such as Coumadin, Plavix, Fragmin, Lovenox, etc., or any anti-inflammatories such as:  Aspirin, Ibuprofen, Advil, Naproxen prior to your procedure. We also ask that you stop any OTC medications such as fish oil, vitamin E, glucosamine, garlic, Multivitamins, COQ 10, etc.    You must make arrangements for a responsible adult to arrive with you and stay in our waiting area during your procedure. They will also need to take you home after your procedure. For your safety you will not be allowed to leave alone or drive yourself home. Also for your safety, it is strongly suggested that someone stay with you the first 24 hours after your procedure. For your comfort, please wear simple loose fitting clothing to the center. Please do not bring valuables. If you have a living will and a durable power of  for healthcare, please bring in a copy.      You will need to bring a photo ID and insurance card    Our goal is to provide you with excellent care so if you have any questions, please contact us at the [facility] at [phone]         Please note these are generalized instructions for all colonoscopy cases, you may be provided with more specific instructions if necessary

## 2023-01-27 ENCOUNTER — ANESTHESIA EVENT (OUTPATIENT)
Dept: ENDOSCOPY | Age: 48
End: 2023-01-27
Payer: COMMERCIAL

## 2023-01-30 ENCOUNTER — HOSPITAL ENCOUNTER (OUTPATIENT)
Age: 48
Setting detail: OUTPATIENT SURGERY
Discharge: HOME | End: 2023-01-30
Attending: INTERNAL MEDICINE | Admitting: INTERNAL MEDICINE
Payer: COMMERCIAL

## 2023-01-30 ENCOUNTER — ANESTHESIA (OUTPATIENT)
Dept: ENDOSCOPY | Age: 48
End: 2023-01-30
Payer: COMMERCIAL

## 2023-01-30 VITALS
BODY MASS INDEX: 36.29 KG/M2 | DIASTOLIC BLOOD PRESSURE: 89 MMHG | OXYGEN SATURATION: 100 % | HEIGHT: 69 IN | RESPIRATION RATE: 17 BRPM | HEART RATE: 60 BPM | WEIGHT: 245 LBS | TEMPERATURE: 97 F | SYSTOLIC BLOOD PRESSURE: 132 MMHG

## 2023-01-30 DIAGNOSIS — D50.9 IRON DEFICIENCY ANEMIA, UNSPECIFIED IRON DEFICIENCY ANEMIA TYPE: ICD-10-CM

## 2023-01-30 DIAGNOSIS — R19.7 DIARRHEA, UNSPECIFIED TYPE: ICD-10-CM

## 2023-01-30 DIAGNOSIS — R10.10 UPPER ABDOMINAL PAIN: ICD-10-CM

## 2023-01-30 LAB
GLUCOSE BLD-MCNC: 116 MG/DL (ref 70–99)
PERFORMED ON: ABNORMAL

## 2023-01-30 PROCEDURE — 6360000002 HC RX W HCPCS: Performed by: NURSE ANESTHETIST, CERTIFIED REGISTERED

## 2023-01-30 PROCEDURE — 3609010600 HC COLONOSCOPY POLYPECTOMY SNARE/COLD BIOPSY: Performed by: INTERNAL MEDICINE

## 2023-01-30 PROCEDURE — 2709999900 HC NON-CHARGEABLE SUPPLY: Performed by: INTERNAL MEDICINE

## 2023-01-30 PROCEDURE — 2580000003 HC RX 258: Performed by: NURSE ANESTHETIST, CERTIFIED REGISTERED

## 2023-01-30 PROCEDURE — 3700000001 HC ADD 15 MINUTES (ANESTHESIA): Performed by: INTERNAL MEDICINE

## 2023-01-30 PROCEDURE — 3609012400 HC EGD TRANSORAL BIOPSY SINGLE/MULTIPLE: Performed by: INTERNAL MEDICINE

## 2023-01-30 PROCEDURE — 88305 TISSUE EXAM BY PATHOLOGIST: CPT

## 2023-01-30 PROCEDURE — 3700000000 HC ANESTHESIA ATTENDED CARE: Performed by: INTERNAL MEDICINE

## 2023-01-30 PROCEDURE — 2500000003 HC RX 250 WO HCPCS: Performed by: NURSE ANESTHETIST, CERTIFIED REGISTERED

## 2023-01-30 PROCEDURE — 7100000011 HC PHASE II RECOVERY - ADDTL 15 MIN: Performed by: INTERNAL MEDICINE

## 2023-01-30 PROCEDURE — 2580000003 HC RX 258: Performed by: STUDENT IN AN ORGANIZED HEALTH CARE EDUCATION/TRAINING PROGRAM

## 2023-01-30 PROCEDURE — 7100000010 HC PHASE II RECOVERY - FIRST 15 MIN: Performed by: INTERNAL MEDICINE

## 2023-01-30 RX ORDER — ONDANSETRON 2 MG/ML
4 INJECTION INTRAMUSCULAR; INTRAVENOUS
Status: CANCELLED | OUTPATIENT
Start: 2023-01-30 | End: 2023-01-31

## 2023-01-30 RX ORDER — SODIUM CHLORIDE 0.9 % (FLUSH) 0.9 %
5-40 SYRINGE (ML) INJECTION PRN
Status: CANCELLED | OUTPATIENT
Start: 2023-01-30

## 2023-01-30 RX ORDER — EPHEDRINE SULFATE 50 MG/ML
INJECTION INTRAVENOUS PRN
Status: DISCONTINUED | OUTPATIENT
Start: 2023-01-30 | End: 2023-01-30 | Stop reason: SDUPTHER

## 2023-01-30 RX ORDER — SODIUM CHLORIDE 9 MG/ML
INJECTION, SOLUTION INTRAVENOUS PRN
Status: CANCELLED | OUTPATIENT
Start: 2023-01-30

## 2023-01-30 RX ORDER — GLYCOPYRROLATE 0.2 MG/ML
INJECTION INTRAMUSCULAR; INTRAVENOUS PRN
Status: DISCONTINUED | OUTPATIENT
Start: 2023-01-30 | End: 2023-01-30 | Stop reason: SDUPTHER

## 2023-01-30 RX ORDER — SODIUM CHLORIDE 0.9 % (FLUSH) 0.9 %
5-40 SYRINGE (ML) INJECTION EVERY 12 HOURS SCHEDULED
Status: DISCONTINUED | OUTPATIENT
Start: 2023-01-30 | End: 2023-01-30 | Stop reason: HOSPADM

## 2023-01-30 RX ORDER — PROPOFOL 10 MG/ML
INJECTION, EMULSION INTRAVENOUS PRN
Status: DISCONTINUED | OUTPATIENT
Start: 2023-01-30 | End: 2023-01-30 | Stop reason: SDUPTHER

## 2023-01-30 RX ORDER — LIDOCAINE HYDROCHLORIDE 20 MG/ML
INJECTION, SOLUTION EPIDURAL; INFILTRATION; INTRACAUDAL; PERINEURAL PRN
Status: DISCONTINUED | OUTPATIENT
Start: 2023-01-30 | End: 2023-01-30 | Stop reason: SDUPTHER

## 2023-01-30 RX ORDER — SODIUM CHLORIDE 0.9 % (FLUSH) 0.9 %
5-40 SYRINGE (ML) INJECTION PRN
Status: DISCONTINUED | OUTPATIENT
Start: 2023-01-30 | End: 2023-01-30 | Stop reason: HOSPADM

## 2023-01-30 RX ORDER — PHENYLEPHRINE HCL IN 0.9% NACL 1 MG/10 ML
SYRINGE (ML) INTRAVENOUS PRN
Status: DISCONTINUED | OUTPATIENT
Start: 2023-01-30 | End: 2023-01-30 | Stop reason: SDUPTHER

## 2023-01-30 RX ORDER — SODIUM CHLORIDE 9 MG/ML
INJECTION, SOLUTION INTRAVENOUS CONTINUOUS PRN
Status: DISCONTINUED | OUTPATIENT
Start: 2023-01-30 | End: 2023-01-30 | Stop reason: SDUPTHER

## 2023-01-30 RX ORDER — SODIUM CHLORIDE 0.9 % (FLUSH) 0.9 %
5-40 SYRINGE (ML) INJECTION EVERY 12 HOURS SCHEDULED
Status: CANCELLED | OUTPATIENT
Start: 2023-01-30

## 2023-01-30 RX ORDER — SODIUM CHLORIDE 9 MG/ML
INJECTION, SOLUTION INTRAVENOUS PRN
Status: DISCONTINUED | OUTPATIENT
Start: 2023-01-30 | End: 2023-01-30 | Stop reason: HOSPADM

## 2023-01-30 RX ADMIN — PROPOFOL 50 MG: 10 INJECTION, EMULSION INTRAVENOUS at 12:55

## 2023-01-30 RX ADMIN — PROPOFOL 100 MG: 10 INJECTION, EMULSION INTRAVENOUS at 12:16

## 2023-01-30 RX ADMIN — Medication 100 MCG: at 12:35

## 2023-01-30 RX ADMIN — GLYCOPYRROLATE 0.1 MG: 0.2 INJECTION, SOLUTION INTRAMUSCULAR; INTRAVENOUS at 12:12

## 2023-01-30 RX ADMIN — EPHEDRINE SULFATE 10 MG: 50 INJECTION INTRAVENOUS at 12:29

## 2023-01-30 RX ADMIN — PROPOFOL 100 MG: 10 INJECTION, EMULSION INTRAVENOUS at 12:20

## 2023-01-30 RX ADMIN — PROPOFOL 100 MG: 10 INJECTION, EMULSION INTRAVENOUS at 12:15

## 2023-01-30 RX ADMIN — SODIUM CHLORIDE: 9 INJECTION, SOLUTION INTRAVENOUS at 12:26

## 2023-01-30 RX ADMIN — SODIUM CHLORIDE: 9 INJECTION, SOLUTION INTRAVENOUS at 11:55

## 2023-01-30 RX ADMIN — PROPOFOL 50 MG: 10 INJECTION, EMULSION INTRAVENOUS at 12:36

## 2023-01-30 RX ADMIN — LIDOCAINE HYDROCHLORIDE 50 MG: 20 INJECTION, SOLUTION EPIDURAL; INFILTRATION; INTRACAUDAL; PERINEURAL at 12:15

## 2023-01-30 RX ADMIN — SODIUM CHLORIDE: 9 INJECTION, SOLUTION INTRAVENOUS at 12:12

## 2023-01-30 RX ADMIN — Medication 100 MCG: at 12:44

## 2023-01-30 RX ADMIN — PROPOFOL 40 MG: 10 INJECTION, EMULSION INTRAVENOUS at 12:43

## 2023-01-30 RX ADMIN — EPHEDRINE SULFATE 10 MG: 50 INJECTION INTRAVENOUS at 12:27

## 2023-01-30 RX ADMIN — PROPOFOL 50 MG: 10 INJECTION, EMULSION INTRAVENOUS at 12:27

## 2023-01-30 ASSESSMENT — PAIN - FUNCTIONAL ASSESSMENT: PAIN_FUNCTIONAL_ASSESSMENT: NONE - DENIES PAIN

## 2023-01-30 NOTE — DISCHARGE INSTRUCTIONS
Discharge Instructions for Colonoscopy     Colonoscopy is a visual exam of the lining of the large intestine, also called the bowel or colon, with a colonoscope. A colonoscope is a flexible tube with a light and a viewing device. It allows the doctor to view the inside of the colon through a tiny video camera. Colonoscopy is performed for many reasons: unexplained anemia , pain, diarrhea , bloody stools, cancer screening, among many other reasons. Complications from a colonoscopy are rare. Some possible serious complications include perforated bowel (which might require surgery) and bleeding (which could require blood transfusion ). Minor complications include bloating, gas, and cramping that can last for 1-2 days after the procedure. Because air is put into your colon during the procedure, it is normal to pass large amounts of air from your rectum. You may not have a bowel movement for 1-3 days after the procedure. What You Will Need:  Someone to drive you home after the procedure     Steps to Take:  [address] when you get home. Because the sedative will make you drowsy, don't drive, operate machinery, or make important decisions the day of the procedure. Feelings of bloating, gas, or cramping may persist for 24 hours. Diet -  Try sips of water first. If tolerated, resume bland food (scrambled eggs, toast, soup) first.  If tolerated, resume regular diet or the diet recommended by your physician. Do not drink alcohol for 24 hours. Physical Activity -  Ask your doctor when you will be able to return to work. Do not drive, operate heavy machinery, or do activities that require coordination or balance for 24 hours. Otherwise, return to your normal routine as soon as you are comfortable to do so, which is usually the next day after the procedure. Medications - When taking medications, it's important to:    Take your medication as directed, not more, not less, not at a different time.   Do not stop taking them without consulting your healthcare provider. Don't share them with anyone else. Know what effects and side effects to expect, and report them to your healthcare provider. If you are taking more than one drug, even if it is an over-the-counter medication, herb, or dietary supplement, be sure to check with a physician or pharmacist about drug interactions. Plan ahead for refills so you don't run out. Lifestyle Changes - The results of your colonoscopy will determine if any lifestyle changes are necessary. Follow-up:  The doctor will usually give you a preliminary report after the medication wears off and you are more alert. The results from a biopsy can take as long as 1-2 weeks to be completed. Schedule a follow-up appointment as directed by your doctor. You should schedule a follow-up colonoscopy as recommended by your doctor. Call Your Doctor If Any of the Following Occurs:  Bleeding from your rectum; notify your doctor if you pass a teaspoonful or more of blood   Black, tarry stools   Severe abdominal pain   Hard, swollen abdomen   Signs of infection, including fever or chills   Inability to pass gas or stool   Coughing, shortness of breath, chest pain, severe nausea or vomiting     In case of an emergency, call 911 immediately. EGD:  S/p gastric bypass  Otherwise unremarkable exam of the upper GI tract. Biopsies taken from the small bowel and stomach    Colonoscopy:  Random biopsies taken throughout the colon  Unremarkable exam of the terminal ileum and colon    Recommendations:    Call for results in 15 days  Repeat colonoscopy in 10 years  Follow-up in GI clinic     Osvaldo Tamayo MD, MD    With questions or concerns, call  [unfilled] at [phone].

## 2023-01-30 NOTE — ANESTHESIA POSTPROCEDURE EVALUATION
[facility] Department of Anesthesiology  Post-Anesthesia Note       Name:  Chelsey Hanley                                  Age:  49 y.o. MRN:  [id number]     Last Vitals & Oxygen Saturation: /89   Pulse 60   Temp 97 °F (36.1 °C) (Temporal)   Resp 17   Ht 5' 9\" (1.753 m)   Wt 245 lb (111.1 kg)   SpO2 100%   BMI 36.18 kg/m²   Patient Vitals for the past 4 hrs:   BP Temp Temp src Pulse Resp SpO2 Height Weight   01/30/23 1312 132/89 -- -- 60 17 100 % -- --   01/30/23 1305 122/79 -- -- 56 16 100 % -- --   01/30/23 1303 120/79 97 °F (36.1 °C) Temporal 55 17 100 % -- --   01/30/23 1145 122/78 (!) 96.7 °F (35.9 °C) Temporal 50 16 99 % 5' 9\" (1.753 m) 245 lb (111.1 kg)       Level of consciousness:  Awake, alert    Respiratory: Respirations easy, no distress. Stable. Cardiovascular: Hemodynamically stable. Hydration: Adequate. PONV: Adequately managed. Post-op pain: Adequately controlled. Post-op assessment: Tolerated anesthetic well without complication. Complications:  None.     Irena Donahue MD  January 30, 2023   1:44 PM

## 2023-01-30 NOTE — OP NOTE
EGD and Colonoscopy Procedure  Note            Patient: Maribel Piña  : 1975  CSN:     Procedure: Esophagogastroduodenoscopy with biopsy  Colonoscopy with biopsy    Date:  2023     Surgeon:  Shana Mcclelland MD, MD     Referring Physician:  Doretha Betancourt MD    Preoperative Diagnosis:  iron deficiency anemia, diarrhea, upper abdominal pain    Postoperative Diagnosis:  s/p gastric bypass, diarrhea    Anesthesia:  MAC, see anesthesia documentation     EBL: <50 mL    Indications: This is a 49y.o. year old female who presents today with iron deficiency anemia, diarrhea, upper abdominal pain. EGD and colonoscopy for further evaluation. Procedure Details:    With the patient in left lateral position the endoscope was passed through the hypopharynx into the esophagus. The scope was advanced all the way to the duodenum. Evidence of prior elaine-en-y gastric bypass with normal appearing anastomosis. Small gastric remnant with unremarkable gastric mucosa, biopsied (jar B). Normal appearing jejunal limbs without ulcers, biopsied (jar A). The mucosa in the esophagus appeared normal.  The scope was withdrawn and the procedure was terminated. Gastric or Duodenal ulcer present: No    Procedure Details:    With the patient in left lateral decubitus position the endoscope was inserted through the anorectal area into the rectum. The scope was then advanced through the length of the colon to the terminal ileum. The ileocecal valve was visualized and cannulated. The quality of preparation was good. Water was insufflated through the biopsy channel to clean out the colon and look at the underlying mucosa. The scope was carefully withdrawn and found to be unremarkable exam of the terminal ileum and colon. Random biopsies taken throughout the colon (jar C). Digital rectal examination was performed, no abnormalities noted.   The scope was advanced all the way to the cecum, the mucosa appeared normal.  Appendix was identified. The terminal ileum was briefly intubated, the mucosa appeared normal.  The mucosa in the ascending, transverse, descending, sigmoid and rectum appeared normal.  On retroflexion no abnormalities were noted. Scope was withdrawn and the procedure was terminated. Impression:    EGD:  S/p gastric bypass  Otherwise unremarkable exam of the upper GI tract.  Biopsies taken from the small bowel and stomach    Colonoscopy:  Random biopsies taken throughout the colon  Unremarkable exam of the terminal ileum and colon    Recommendations:    Call for results in 15 days  Repeat colonoscopy in 10 years  Follow-up in GI clinic     Monik Potter MD, MD, MD   [address]  1/30/2023

## 2023-01-30 NOTE — H&P
Gastroenterology History and Physical         Patient: Antoinette Anderson    MRN: [id number]    Sex: female    YOB: 1975    Age: 49 y.o. Location: [address] [place] [address] 12         Date:2023    Primary Care Physician: Warren Bermeo MD             Patient: Antoinette Anderson         Physician: Isha Brooks MD, MD    Iron deficiency anemia, Diarrhea, Upper abdominal pain              Vital Signs: /78   Pulse 50   Temp (!) 96.7 °F (35.9 °C) (Temporal)   Resp 16   Ht 5' 9\" (1.753 m)   Wt 245 lb (111.1 kg)   SpO2 99%   BMI 36.18 kg/m²          Allergies: Allergies   Allergen Reactions    Ace Inhibitors Anaphylaxis     \"Throat and Mouth Swelled UP\"  Angioedema   Angioedema       Ibuprofen      Stomach ulcers    Pcn [Penicillins] Itching    Pork (Porcine) Protein      Yazidi            History of Present Illness: 49 y.o. female presents for EGD and colonoscopy for iron deficiency anemia, upper abdominal pain and diarrhea.          History:    Past Medical History:   Diagnosis Date    Diabetes mellitus ([address]     Fatty liver, alcoholic     Gastric ulcer     Hypertension     IBS (irritable bowel syndrome)     Sleep apnea     just start cpap    Suicidal ideation        Past Surgical History:   Procedure Laterality Date     SECTION      CHOLECYSTECTOMY, LAPAROSCOPIC N/A 2021    LAPAROSCOPIC CHOLECYSTECTOMY WITH CHOLANGIOGRAM, performed by Rey Mandel MD at [facility] ENDOSCOPY N/A 2021    EGD DIAGNOSTIC ONLY performed by Yaw Palm MD at [facility] History     Socioeconomic History    Marital status: Single     Spouse name: None    Number of children: None    Years of education: None    Highest education level: None   Tobacco Use    Smoking status: Every Day     Packs/day: 0.50     Years: 20.00     Pack years: 10.00     Types: Cigarettes Smokeless tobacco: Never   Vaping Use    Vaping Use: Some days   Substance and Sexual Activity    Alcohol use: Not Currently     Comment: quit a year ago    Drug use: Not Currently     Types: Cocaine, Marijuana (Weed), Opiates      Comment: snorts        History reviewed. No pertinent family history.         Medications:   Prior to Admission medications    Medication Sig Start Date End Date Taking? Authorizing Provider   DULoxetine (CYMBALTA) 30 MG extended release capsule Take 90 mg by mouth daily   Yes Historical Provider, MD   zolpidem (AMBIEN) 5 MG tablet Take 5 mg by mouth nightly as needed for Sleep.   Yes Historical Provider, MD   hydroCHLOROthiazide (HYDRODIURIL) 25 MG tablet Take 25 mg by mouth daily   Yes Historical Provider, MD   Ascorbic Acid (VITAMIN C) 250 MG tablet Take 250 mg by mouth daily   Yes Historical Provider, MD   b complex vitamins capsule Take 1 capsule by mouth daily   Yes Historical Provider, MD   buprenorphine-naloxone (SUBOXONE) 8-2 MG SUBL SL tablet Place 1 tablet under the tongue daily.   Yes Historical Provider, MD   metFORMIN (GLUCOPHAGE) 500 MG tablet Take 500 mg by mouth 2 times daily (with meals) 9/8/22  Yes Historical Provider, MD   folic acid (FOLVITE) 1 MG tablet Take 1 tablet by mouth daily 1/8/21   Destin Thorne MD   gabapentin (NEURONTIN) 300 MG capsule 300 mg in the morning and at bedtime. 12/16/20   Historical Provider, MD   lansoprazole (PREVACID) 30 MG delayed release capsule Take 30 mg by mouth daily    Historical Provider, MD   acetaminophen (TYLENOL) 325 MG tablet Take 2 tablets by mouth every 6 hours as needed for Pain 8/5/16   ROSY Sherman   sucralfate (CARAFATE) 1 GM/10ML suspension Take 10 mLs by mouth 4 times daily (before meals and nightly) for 10 days 7/3/16 7/13/16  ROSY Sherman   atenolol (TENORMIN) 100 MG tablet Take 50 mg by mouth in the morning and at bedtime    Historical Provider, MD       ROS: the patient denies stiff neck, double vision,  ataxia,  chest pain, orthopnea, sob, wheezing, productive cough    Physical Exam: I personally examined the patient.     Heart: within normal limits    Lungs: no respiratory distress    Mental status:  Within normal limits    Mallampati score: II       Planned Procedure: EGD and colonoscopy         Signed By:    Dagmar Salazar MD, MD         January 30, 2023

## 2023-01-30 NOTE — ANESTHESIA PRE PROCEDURE
[facility] Department of Anesthesiology  Pre-Anesthesia Evaluation/Consultation       Name:  Shira Canales  : 1975  Age:  49 y.o. MRN:  [id number]  Date: 2023           Surgeon: Surgeon(s):  Rosalita Gosselin, MD    Procedure: Procedure(s):  COLONOSCOPY DIAGNOSTIC  EGD ESOPHAGOGASTRODUODENOSCOPY     Allergies   Allergen Reactions    Ace Inhibitors Anaphylaxis     \"Throat and Mouth Swelled UP\"  Angioedema   Angioedema       Ibuprofen      Stomach ulcers    Pcn [Penicillins] Itching    Pork (Porcine) Protein      Religious     Patient Active Problem List   Diagnosis    Alcohol withdrawal syndrome with complication ([facility]    Suicidal ideation    Pyelonephritis     Past Medical History:   Diagnosis Date    Diabetes mellitus ([facility]     Fatty liver, alcoholic     Gastric ulcer     Hypertension     IBS (irritable bowel syndrome)     Sleep apnea     just start cpap    Suicidal ideation      Past Surgical History:   Procedure Laterality Date     SECTION      CHOLECYSTECTOMY, LAPAROSCOPIC N/A 2021    LAPAROSCOPIC CHOLECYSTECTOMY WITH CHOLANGIOGRAM, performed by Toya Salazar MD at [facility] UPPER GASTROINTESTINAL ENDOSCOPY N/A 2021    EGD DIAGNOSTIC ONLY performed by Dania Flores MD at [facility] History     Tobacco Use    Smoking status: Every Day     Packs/day: 0.50     Years: 20.00     Pack years: 10.00     Types: Cigarettes    Smokeless tobacco: Never   Vaping Use    Vaping Use: Some days   Substance Use Topics    Alcohol use: Not Currently     Comment: quit a year ago    Drug use: Not Currently     Types: Cocaine, Marijuana ([place]atha Bath), Opiates      Comment: snorts      Medications  No current facility-administered medications on file prior to encounter.      Current Outpatient Medications on File Prior to Encounter   Medication Sig Dispense Refill    DULoxetine (CYMBALTA) 30 MG extended release capsule Take 90 mg by mouth daily      zolpidem (AMBIEN) 5 MG tablet Take 5 mg by mouth nightly as needed for Sleep.  hydroCHLOROthiazide (HYDRODIURIL) 25 MG tablet Take 25 mg by mouth daily      Ascorbic Acid (VITAMIN C) 250 MG tablet Take 250 mg by mouth daily      b complex vitamins capsule Take 1 capsule by mouth daily      buprenorphine-naloxone (SUBOXONE) 8-2 MG SUBL SL tablet Place 1 tablet under the tongue daily.  metFORMIN (GLUCOPHAGE) 500 MG tablet Take 500 mg by mouth 2 times daily (with meals)      folic acid (FOLVITE) 1 MG tablet Take 1 tablet by mouth daily 30 tablet 3    gabapentin (NEURONTIN) 300 MG capsule 300 mg in the morning and at bedtime.       lansoprazole (PREVACID) 30 MG delayed release capsule Take 30 mg by mouth daily      acetaminophen (TYLENOL) 325 MG tablet Take 2 tablets by mouth every 6 hours as needed for Pain 30 tablet 0    sucralfate (CARAFATE) 1 GM/10ML suspension Take 10 mLs by mouth 4 times daily (before meals and nightly) for 10 days 400 mL 0    atenolol (TENORMIN) 100 MG tablet Take 50 mg by mouth in the morning and at bedtime       Current Facility-Administered Medications   Medication Dose Route Frequency Provider Last Rate Last Admin    sodium chloride flush 0.9 % injection 5-40 mL  5-40 mL IntraVENous 2 times per day Eileen Quach MD        sodium chloride flush 0.9 % injection 5-40 mL  5-40 mL IntraVENous PRN Eileen Quach MD        0.9 % sodium chloride infusion   IntraVENous PRN Eileen Quach MD         Vital Signs (Current)   Vitals:    23 1145   BP: 122/78   Pulse: 50   Resp: 16   Temp: (!) 96.7 °F (35.9 °C)   SpO2: 99%     Vital Signs Statistics (for past 48 hrs)     Temp  Av.7 °F (35.9 °C)  Min: 96.7 °F (35.9 °C)   Min taken time: 23 1145  Max: 96.7 °F (35.9 °C)   Max taken time: 23 1145  Pulse  Av  Min: 48   Min taken time: 23 1145  Max: 48   Max taken time: 23 1145  Resp  Av  Min: 16   Min taken time: 23 1145  Max: 12   Max taken time: 23 1145  BP  Min: 122/78   Min taken time: 23 1145  Max: 122/78   Max taken time: 23 1145  SpO2  Av %  Min: 99 %   Min taken time: 23 1145  Max: 99 %   Max taken time: 23 1145    BP Readings from Last 3 Encounters:   23 122/78   21 125/77   21 (!) 134/98     BMI  Body mass index is 36.18 kg/m². Estimated body mass index is 36.18 kg/m² as calculated from the following:    Height as of this encounter: 5' 9\" (1.753 m). Weight as of this encounter: 245 lb (111.1 kg).     CBC   Lab Results   Component Value Date/Time    WBC 8.0 2021 06:56 AM    RBC 4.39 2021 06:56 AM    HGB 13.6 2021 06:56 AM    HCT 40.9 2021 06:56 AM    MCV 93.2 2021 06:56 AM    RDW 14.7 2021 06:56 AM     2021 06:56 AM     CMP    Lab Results   Component Value Date/Time     2021 09:54 AM    K 3.7 2021 09:54 AM    CL 98 2021 09:54 AM    CO2 21 2021 09:54 AM    BUN 26 2021 09:54 AM    CREATININE 1.2 2021 09:54 AM    GFRAA 58 2021 09:54 AM    GFRAA >60 2013 08:50 PM    AGRATIO 1.0 2021 09:54 AM    LABGLOM 48 2021 09:54 AM    GLUCOSE 196 2021 09:54 AM    PROT 6.9 2021 09:54 AM    PROT 7.5 2013 05:57 PM    CALCIUM 9.0 2021 09:54 AM    BILITOT <0.2 2021 09:54 AM    ALKPHOS 98 2021 09:54 AM    AST 23 2021 09:54 AM    ALT 16 2021 09:54 AM     BMP    Lab Results   Component Value Date/Time     2021 09:54 AM    K 3.7 2021 09:54 AM    CL 98 2021 09:54 AM    CO2 21 2021 09:54 AM    BUN 26 2021 09:54 AM    CREATININE 1.2 2021 09:54 AM    CALCIUM 9.0 2021 09:54 AM    GFRAA 58 2021 09:54 AM    GFRAA >60 2013 08:50 PM    LABGLOM 48 2021 09:54 AM    GLUCOSE 196 2021 09:54 AM POCGlucose  No results for input(s): GLUCOSE in the last 72 hours. Coags    Lab Results   Component Value Date/Time    PROTIME 14.4 12/29/2020 07:28 AM    INR 1.24 12/29/2020 07:28 AM     HCG (If Applicable)   Lab Results   Component Value Date    PREGTESTUR Negative 01/04/2021      ABGs No results found for: PHART, PO2ART, SUT2FPG, QIM4UZB, BEART, G1OGFZZU   Type & Screen (If Applicable)  No results found for: LABABO, LABRH                         BMI: Wt Readings from Last 3 Encounters:       NPO Status:   Date of last liquid consumption: 01/30/23   Time of last liquid consumption: 0400   Date of last solid food consumption: 01/28/23      Time of last solid consumption: 1900       Anesthesia Evaluation  Patient summary reviewed no history of anesthetic complications:   Airway: Mallampati: III  TM distance: >3 FB   Neck ROM: full  Mouth opening: > = 3 FB   Dental:    (+) partials      Pulmonary:normal exam    (+) sleep apnea:                             Cardiovascular:  Exercise tolerance: good (>4 METS),   (+) hypertension:,       ECG reviewed  Rhythm: regular  Rate: normal           Beta Blocker:  Dose within 24 Hrs         Neuro/Psych:   (+) psychiatric history:            GI/Hepatic/Renal:   (+) GERD: well controlled, PUD, liver disease (santana):, bowel prep,          ROS comment: ibs. Endo/Other:    (+) DiabetesType II DM, , .    (-) blood dyscrasia                ROS comment: ETOH, suboxone Abdominal:   (+) obese,           Vascular: negative vascular ROS. Other Findings:           Anesthesia Plan      MAC     ASA 3       Induction: intravenous. Anesthetic plan and risks discussed with patient. Plan discussed with CRNA. This pre-anesthesia assessment may be used as a history and physical.    DOS STAFF ADDENDUM:    Pt seen and examined, chart reviewed (including anesthesia, drug and allergy history). No interval changes to history and physical examination.   Anesthetic plan, risks, benefits, alternatives, and personnel involved discussed with patient. Questions and concerns addressed. Patient(family) verbalized an understanding and agrees to proceed.       Cristóbal Pro MD  January 30, 2023  11:52 AM

## 2023-03-07 ENCOUNTER — TELEPHONE (OUTPATIENT)
Dept: GYNECOLOGY | Age: 48
End: 2023-03-07

## 2023-03-07 ENCOUNTER — OFFICE VISIT (OUTPATIENT)
Dept: GYNECOLOGY | Age: 48
End: 2023-03-07
Payer: COMMERCIAL

## 2023-03-07 VITALS
WEIGHT: 251.2 LBS | DIASTOLIC BLOOD PRESSURE: 82 MMHG | HEART RATE: 56 BPM | OXYGEN SATURATION: 97 % | SYSTOLIC BLOOD PRESSURE: 112 MMHG | BODY MASS INDEX: 37.1 KG/M2 | RESPIRATION RATE: 12 BRPM | TEMPERATURE: 97.6 F

## 2023-03-07 DIAGNOSIS — D25.1 INTRAMURAL LEIOMYOMA OF UTERUS: ICD-10-CM

## 2023-03-07 DIAGNOSIS — I10 PRIMARY HYPERTENSION: ICD-10-CM

## 2023-03-07 DIAGNOSIS — E11.9 TYPE 2 DIABETES MELLITUS WITHOUT COMPLICATION, WITHOUT LONG-TERM CURRENT USE OF INSULIN (HCC): ICD-10-CM

## 2023-03-07 DIAGNOSIS — N85.2 UTERINE HYPERTROPHY: Primary | ICD-10-CM

## 2023-03-07 DIAGNOSIS — N94.6 DYSMENORRHEA: ICD-10-CM

## 2023-03-07 DIAGNOSIS — K70.0 FATTY LIVER, ALCOHOLIC: ICD-10-CM

## 2023-03-07 DIAGNOSIS — Z98.890 HISTORY OF ENDOMETRIAL ABLATION: ICD-10-CM

## 2023-03-07 DIAGNOSIS — G47.33 OBSTRUCTIVE SLEEP APNEA SYNDROME: ICD-10-CM

## 2023-03-07 DIAGNOSIS — N93.9 ABNORMAL UTERINE BLEEDING (AUB): ICD-10-CM

## 2023-03-07 PROBLEM — G47.30 SLEEP APNEA: Status: ACTIVE | Noted: 2023-03-07

## 2023-03-07 PROCEDURE — G8419 CALC BMI OUT NRM PARAM NOF/U: HCPCS | Performed by: OBSTETRICS & GYNECOLOGY

## 2023-03-07 PROCEDURE — 99204 OFFICE O/P NEW MOD 45 MIN: CPT | Performed by: OBSTETRICS & GYNECOLOGY

## 2023-03-07 PROCEDURE — 3079F DIAST BP 80-89 MM HG: CPT | Performed by: OBSTETRICS & GYNECOLOGY

## 2023-03-07 PROCEDURE — 3046F HEMOGLOBIN A1C LEVEL >9.0%: CPT | Performed by: OBSTETRICS & GYNECOLOGY

## 2023-03-07 PROCEDURE — 3074F SYST BP LT 130 MM HG: CPT | Performed by: OBSTETRICS & GYNECOLOGY

## 2023-03-07 PROCEDURE — G8427 DOCREV CUR MEDS BY ELIG CLIN: HCPCS | Performed by: OBSTETRICS & GYNECOLOGY

## 2023-03-07 PROCEDURE — 4004F PT TOBACCO SCREEN RCVD TLK: CPT | Performed by: OBSTETRICS & GYNECOLOGY

## 2023-03-07 PROCEDURE — G8484 FLU IMMUNIZE NO ADMIN: HCPCS | Performed by: OBSTETRICS & GYNECOLOGY

## 2023-03-07 PROCEDURE — 2022F DILAT RTA XM EVC RTNOPTHY: CPT | Performed by: OBSTETRICS & GYNECOLOGY

## 2023-03-07 RX ORDER — SODIUM CHLORIDE 0.9 % (FLUSH) 0.9 %
5-40 SYRINGE (ML) INJECTION PRN
OUTPATIENT
Start: 2023-03-07

## 2023-03-07 RX ORDER — SODIUM CHLORIDE, SODIUM LACTATE, POTASSIUM CHLORIDE, CALCIUM CHLORIDE 600; 310; 30; 20 MG/100ML; MG/100ML; MG/100ML; MG/100ML
INJECTION, SOLUTION INTRAVENOUS CONTINUOUS
OUTPATIENT
Start: 2023-03-07

## 2023-03-07 RX ORDER — SODIUM CHLORIDE 0.9 % (FLUSH) 0.9 %
5-40 SYRINGE (ML) INJECTION EVERY 12 HOURS SCHEDULED
OUTPATIENT
Start: 2023-03-07

## 2023-03-07 NOTE — LETTER
March 7, 2023      Bishnu Medrano MD  [address] [place] [address] [address]  [facility] [address]      Patient: Leidy Miles   MR Number: [id number]   YOB: 1975   Date of Visit: 3/7/2023       Dear Bishnu Medrano:    Leidy Miles was seen today regarding her symptomatic enlarged fibroid uterus. She will be scheduled for a hysterectomy. She will see you preoperatively for clearance.     Sincerely,        Ludy Kaminski MD

## 2023-03-07 NOTE — TELEPHONE ENCOUNTER
PA request has been sent to Neville Dill at [phone]. Waiting on response. Pt mailbox has not been set up yet, could not lvm. Pt has not been on [employer] since 2016. Will have to call back later about surgery time and day.

## 2023-03-07 NOTE — PROGRESS NOTES
Meghan Velazco is a 51 y.o. female who is seen in consultation today for evaluation and treatment of menorrhagia, fibroids, dysmenorrhea. Sent from [place] NP. Has pain and cramping light old dark bleeding which is worsening over time. Is in same sex relationship not interested in hormone treatment and has had ablation. US:  FINDINGS:  UTERUS: Fibroids are present. The myometrium is thickened and heterogeneous. Intramural fibroids are seen in the uterine fundus, one measures 6.4 cm, the other extends 6.0 cm.   9.3 x 6.4 x 8.1 cm  ENDOMETRIUM: Heterogeneous and thickened measuring 1.5 cm. (normal < 16mm premenopausal and < 5mm postmenopausal or 10mm if on hormone replacement therapy)  RIGHT OVARY: Unremarkable. 3.2 x 1.8 x 2.5 cm  LEFT OVARY: Unremarkable. 2.0 x 1.3 x 1.1 cm  FREE FLUID: None  BLADDER: Unremarkable  OTHER: None   IMPRESSION  Large intramural fibroids  Thickened myometrium, consider adenomyosis  Heterogeneous endometrial thickening. Endometrial hyperplasia versus neoplasia versus polyps    Meghan Velazco has been suffering from AUB and dysmenorrhea  During that time, she has tried NSAID and conservative surgical management with endometrial ablation in attempts to decrease her bleeding and cramping. They have not worked to the patients satisfaction. She reports continued worsening of symptoms and she is desiring treatment in the form of hysterectomy. Meghan Velazco has completed childbearing   1 CS    Patient Active Problem List   Diagnosis    Alcohol withdrawal syndrome with complication (HCC)    Suicidal ideation    Pyelonephritis    Diabetes mellitus ([address]    Fatty liver, alcoholic    Hypertension    Sleep apnea     OB History    No obstetric history on file.        Past Medical History:   Diagnosis Date    Diabetes mellitus ([address]     Fatty liver, alcoholic     Gastric ulcer     Hypertension     IBS (irritable bowel syndrome)     Sleep apnea     just start cpap    Suicidal ideation      Past Surgical History:   Procedure Laterality Date     SECTION      CHOLECYSTECTOMY, LAPAROSCOPIC N/A 2021    LAPAROSCOPIC CHOLECYSTECTOMY WITH CHOLANGIOGRAM, performed by Adama Soria MD at [address] [facility] [address] 2023    COLONOSCOPY POLYPECTOMY SNARE/COLD BIOPSY performed by Marlon Chung MD at [facility] N/A 2021    EGD DIAGNOSTIC ONLY performed by Noemi Friedman MD at [facility] 2023    EGD BIOPSY performed by Marlon Chung MD at [address]: Ace inhibitors, Ibuprofen, Pcn [penicillins], and Pork (porcine) protein  Current Outpatient Medications   Medication Sig Dispense Refill    DULoxetine (CYMBALTA) 30 MG extended release capsule Take 90 mg by mouth daily      zolpidem (AMBIEN) 5 MG tablet Take 5 mg by mouth nightly as needed for Sleep.      hydroCHLOROthiazide (HYDRODIURIL) 25 MG tablet Take 25 mg by mouth daily      Ascorbic Acid (VITAMIN C) 250 MG tablet Take 250 mg by mouth daily      b complex vitamins capsule Take 1 capsule by mouth daily      buprenorphine-naloxone (SUBOXONE) 8-2 MG SUBL SL tablet Place 1 tablet under the tongue daily. metFORMIN (GLUCOPHAGE) 500 MG tablet Take 500 mg by mouth 2 times daily (with meals)      folic acid (FOLVITE) 1 MG tablet Take 1 tablet by mouth daily 30 tablet 3    gabapentin (NEURONTIN) 300 MG capsule 300 mg in the morning and at bedtime.       lansoprazole (PREVACID) 30 MG delayed release capsule Take 30 mg by mouth daily      acetaminophen (TYLENOL) 325 MG tablet Take 2 tablets by mouth every 6 hours as needed for Pain 30 tablet 0    atenolol (TENORMIN) 100 MG tablet Take 50 mg by mouth in the morning and at bedtime      sucralfate (CARAFATE) 1 GM/10ML suspension Take 10 mLs by mouth 4 times daily (before meals and nightly) for 10 days 400 mL 0     No current facility-administered medications for this visit. Social History     Tobacco Use    Smoking status: Every Day     Packs/day: 0.50     Years: 20.00     Pack years: 10.00     Types: Cigarettes    Smokeless tobacco: Never   Substance Use Topics    Alcohol use: Not Currently     Comment: quit a year ago     No family history on file. Review of systems:  No complaints of symptoms involving:  Constitutional: negative for fever, chills. Eyes: No change in vision, double vision, or scotomata. HENT: No sore throat, ear pain or nasal congestion. Respiratory: No cough, shortness of breath or hemoptysis. Cardiovascular: No chest pain, orthopnea, fainting. Skin: No pruritus or generalized rash. Neurologic: No focal weakness or sensory changes. Gynecologic: see HPI    OBJECTIVE:  /82   Pulse 56   Temp 97.6 °F (36.4 °C) (Temporal)   Resp 12   Wt 251 lb 3.2 oz (113.9 kg)   SpO2 97%   BMI 37.10 kg/m²  Body mass index is 37.1 kg/m². No LMP recorded. Patient has had an ablation. General appearance: alert, no acute distress, non-toxic, normal respiratory effort  HEENT: Sclera are clear and non icteric. MOOD and AFFECT: Appropriate,calm. JUDGEMENT and INSIGHT: Intact. Skin:  no lesions,no rashes  Abdominal: Obese. Nondistended. LABS:  1/16/2023 12:04 PM   HEMOGLOBIN A1C  4.2 - 5.6 % 6.3 (H)   EST AVERAGE GLUCOSE  mg/dL 134   TSH  0.270 - 4.200 mcIU/mL 1.310   FSH  mIU/mL 28.60   BETA-HCG  <5.0 mIU/mL <5.0   LH  mIU/mL 10.7   PROLACTIN  2.8 - 29.2 ng/mL 6.7       1. Uterine hypertrophy    2. Intramural leiomyoma of uterus    3. Dysmenorrhea    4. Abnormal uterine bleeding (AUB)    5. History of endometrial ablation    6. Type 2 diabetes mellitus without complication, without long-term current use of insulin (HCC)    7. Fatty liver, alcoholic    8. Primary hypertension    9.  Obstructive sleep apnea syndrome      Menorrhagia, fibroids, dysmenorrhea with failed endometrial ablation. Progressive symptoms. The natural history of her medical problem was reviewed. She declines expectant management. The patient desires definitive therapy in the form of hysterectomy. She has been given the handouts: Laparoscopic Hysterectomy and dVH. Options for management were discussed at length and all questions were answered. Options included expectant management, medical management, and other treatment options such as [place] and myolysis. Narcisa Dill is interested in hysterectomy - specifically total laparoscopic hysterectomy. She is educated regarding surgical approaches to hysterectomy. Elective oopherectomy for ovarian cancer prophylaxis was discussed and the resulting menopause that would follow. Narcisa Dill declines elective oopherectomy however consents to ovarian biopsy or removal at my clinical discretion based upon the findings at the time of surgery. Elective salpingectomy for the prevention of cancer was discussed with the patient. The patient wants to proceed with this if technically feasible at the time of her surgery. Risks of surgery including, but not limited to bleeding, blood clot, infection, damage to internal organs such as bowel, bladder, blood vessels, ureters, nerves, anesthetic complications, heart and lung complications and death were discussed in both medical and lay terms. These risks were discussed in detail and Narcisa Dill voices awareness and understanding of the possibility of complications and how it would change her operative and post operative course and recovery - questions were answered. I also discussed if she had prior surgery, how prior surgery could cause scar tissue that can increase her chance of a complication such as bowel injury. The anticipated preoperative, operative, and post operative course as well as hospital stay and expected return to work issues were reviewed - questions were answered.   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 We also discussed preconditioning prior to surgery as well as cessation of tobacco.    Kathryn Franco was able voice understanding regarding the nature of her problem, the proposed surgery of total laparoscopic hysterectomy and its associated risks. She declines expectant management, medical management, and other surgical procedures. She finds the risks acceptable and wishes to proceed. Please see orders in Epic.   Plan: [facility] [place] robotic total laparoscopic hysterectomy with bilateral salpingectomy

## 2023-03-08 NOTE — TELEPHONE ENCOUNTER
Called pt and I could not hear the pt very well and we lost connection. Called back again and it went to . Mailbox is not set up and could not lvm.       Surgery instructions have been mailed out to pt    Thank you

## 2023-03-30 ENCOUNTER — TELEPHONE (OUTPATIENT)
Dept: GYNECOLOGY | Age: 48
End: 2023-03-30

## 2023-03-30 NOTE — TELEPHONE ENCOUNTER
Patient says she needs a note to give to her job ASAP  in regarding to her been off for her upcoming surgery April 27 th. She  was told by Dr Froy Logan that would give her this note.      Patient can be contacted at [phone]

## 2023-03-30 NOTE — TELEPHONE ENCOUNTER
Attempted to call pt. Pt simply says \"espanol\" I try to introduce myself as from Dr Claudia Elizabeth office as I have in their chart that they speak [address] but pt again repeated \"espanol\"     I tell pt \"un minuto\" and place her on hold while I try and get a  on. Call was disconnected. I double checked the phone number and it was dialed correctly.

## 2023-03-31 NOTE — TELEPHONE ENCOUNTER
Tried to reach pt and was unsuccessful. If she calls back please ask if she has FMLA forms she needs filled out or if she just needs a letter. If FMLA - Have them fax it to [phone]    If letter - Ask her how she would like to obtain the letter. Mail or pickup.

## 2023-05-22 RX ORDER — BUSPIRONE HYDROCHLORIDE 10 MG/1
10 TABLET ORAL 2 TIMES DAILY
COMMUNITY
Start: 2023-03-16

## 2023-05-22 RX ORDER — CETIRIZINE HYDROCHLORIDE 10 MG/1
10 TABLET ORAL DAILY
COMMUNITY

## 2023-05-22 RX ORDER — BUPRENORPHINE 2 MG/1
2 TABLET SUBLINGUAL 3 TIMES DAILY
COMMUNITY
End: 2023-05-22

## 2023-05-22 RX ORDER — CLONIDINE HYDROCHLORIDE 0.1 MG/1
0.1 TABLET ORAL DAILY PRN
COMMUNITY
End: 2023-05-22

## 2023-05-31 ENCOUNTER — ANESTHESIA EVENT (OUTPATIENT)
Dept: OPERATING ROOM | Age: 48
End: 2023-05-31
Payer: COMMERCIAL

## 2023-05-31 NOTE — OP NOTE
Operative Note      Patient: Lacy Olszewski  YOB: 1975  MRN: [id number]    Date of Procedure: 6/1/2023    Pre-Op Diagnosis Codes:     * Uterine hypertrophy [N85.2]     * Intramural leiomyoma of uterus [D25.1]     * Dysmenorrhea [N94.6]     * Abnormal uterine bleeding [N93.9]     * History of endometrial ablation [Z98.890]    Post-Op Diagnosis: Same       Procedure(s):  ROBOTIC TOTAL LAPAROSCOPIC HYSTERECTOMY WITH BILATERAL SALPINGECTOMY    Surgeon(s):  Chandana Lu MD    ANESTHESIA: General.     INDICATIONS: Lacy Olszewski is a 51 y.o. female with Menorrhagia, Dysmenorrhea, fibroid uterus. The patient wished to proceed with definitive therapy in the form of the proposed procedure. She declines alternative options including medical management and other surgical options. She declines prophylactic oophorectomy but wants to proceed with prophylactic salpingectomy. I discussed with her the risks of surgery including but not limited to bleeding, blood clot, infection, damage to internal organs such as bowel, bladder, blood vessels, ureters, nerves, anesthetic complications, heart-lung complications and cuff dehiscence. Anticipated preoperative, operative, postoperative and convalescence courses as well as return to normal activities were also reviewed as well as activity limitations postoperatively. The patient is able to voice understanding regarding the nature of her problem, the proposed procedure and its associated risks. All questions were answered and she wished to proceed. OPERATIVE FINDINGS: Moderately enlarged fibroid uterus. Heavily scarred anterior cul-de-sac. Partially obliterated anterior cul-de-sac near the bladder. Scarring in the mid to upper abdomen. Scarring involving the posterior cul-de-sac and both adnexa. OPERATIVE TECHNIQUE: The patient was taken to the operating room and placed in a supine position.   General anesthetic was administered by anesthesia

## 2023-05-31 NOTE — H&P
Update History & Physical    The patient's History and Physical of May 18, 2023 was reviewed with the patient and I examined the patient. There was no change. The surgical site was confirmed by the patient and me. Plan: The risks, benefits, expected outcome, and alternative to the recommended procedure have been discussed with the patient. Patient understands and wants to proceed with the procedure.

## 2023-05-31 NOTE — DISCHARGE INSTRUCTIONS
other.  The following additional patient specific information was reviewed with the patient/significant other:  [x]Procedure/physician specific instructions  [x]Medication information sheet(S) including potential side effects  []Marjories egress test  []Pain Ball management  []FAQ Catheter associated blood stream infections  [x]FAQ Surgical Site Infections  []Other-    I have read and understand the instructions given to me: ____________________________________________   (Patient/S.O. Signature)            Date/time 6/1/2023 9:56 AM         PACU:  [phone]   M-F 700 AM - 7 PM      SAME DAY SERVICES:  [phone] M-F 7AM-6PM        If you smoke STOP. We care about your health!

## 2023-06-01 ENCOUNTER — HOSPITAL ENCOUNTER (OUTPATIENT)
Age: 48
Setting detail: OUTPATIENT SURGERY
Discharge: HOME | End: 2023-06-01
Attending: OBSTETRICS & GYNECOLOGY | Admitting: OBSTETRICS & GYNECOLOGY
Payer: COMMERCIAL

## 2023-06-01 ENCOUNTER — ANESTHESIA (OUTPATIENT)
Dept: OPERATING ROOM | Age: 48
End: 2023-06-01
Payer: COMMERCIAL

## 2023-06-01 VITALS
DIASTOLIC BLOOD PRESSURE: 80 MMHG | WEIGHT: 244.2 LBS | TEMPERATURE: 98.1 F | SYSTOLIC BLOOD PRESSURE: 131 MMHG | BODY MASS INDEX: 34.96 KG/M2 | HEIGHT: 70 IN | OXYGEN SATURATION: 100 % | RESPIRATION RATE: 14 BRPM | HEART RATE: 54 BPM

## 2023-06-01 DIAGNOSIS — N85.2 UTERINE HYPERTROPHY: ICD-10-CM

## 2023-06-01 DIAGNOSIS — Z98.890 HISTORY OF ENDOMETRIAL ABLATION: ICD-10-CM

## 2023-06-01 DIAGNOSIS — D25.1 INTRAMURAL LEIOMYOMA OF UTERUS: ICD-10-CM

## 2023-06-01 DIAGNOSIS — N94.6 DYSMENORRHEA: ICD-10-CM

## 2023-06-01 DIAGNOSIS — N93.9 ABNORMAL UTERINE BLEEDING: ICD-10-CM

## 2023-06-01 DIAGNOSIS — G89.18 POSTOPERATIVE PAIN: Primary | ICD-10-CM

## 2023-06-01 DIAGNOSIS — F10.939 ALCOHOL WITHDRAWAL SYNDROME WITH COMPLICATION (HCC): ICD-10-CM

## 2023-06-01 LAB
ABO + RH BLD: NORMAL
BLD GP AB SCN SERPL QL: NORMAL
GLUCOSE BLD-MCNC: 131 MG/DL (ref 70–99)
GLUCOSE BLD-MCNC: 94 MG/DL (ref 70–99)
HCG UR QL: NEGATIVE
HCT VFR BLD AUTO: 40.2 % (ref 36–48)
HGB BLD-MCNC: 13.5 G/DL (ref 12–16)
PERFORMED ON: ABNORMAL
PERFORMED ON: NORMAL

## 2023-06-01 PROCEDURE — 86901 BLOOD TYPING SEROLOGIC RH(D): CPT

## 2023-06-01 PROCEDURE — 2580000003 HC RX 258: Performed by: OBSTETRICS & GYNECOLOGY

## 2023-06-01 PROCEDURE — 6360000002 HC RX W HCPCS: Performed by: ANESTHESIOLOGY

## 2023-06-01 PROCEDURE — 84703 CHORIONIC GONADOTROPIN ASSAY: CPT

## 2023-06-01 PROCEDURE — 7100000011 HC PHASE II RECOVERY - ADDTL 15 MIN: Performed by: OBSTETRICS & GYNECOLOGY

## 2023-06-01 PROCEDURE — 3600000014 HC SURGERY LEVEL 4 ADDTL 15MIN: Performed by: OBSTETRICS & GYNECOLOGY

## 2023-06-01 PROCEDURE — 6360000002 HC RX W HCPCS: Performed by: NURSE ANESTHETIST, CERTIFIED REGISTERED

## 2023-06-01 PROCEDURE — 85014 HEMATOCRIT: CPT

## 2023-06-01 PROCEDURE — 86900 BLOOD TYPING SEROLOGIC ABO: CPT

## 2023-06-01 PROCEDURE — 7100000010 HC PHASE II RECOVERY - FIRST 15 MIN: Performed by: OBSTETRICS & GYNECOLOGY

## 2023-06-01 PROCEDURE — 3700000001 HC ADD 15 MINUTES (ANESTHESIA): Performed by: OBSTETRICS & GYNECOLOGY

## 2023-06-01 PROCEDURE — 3600000004 HC SURGERY LEVEL 4 BASE: Performed by: OBSTETRICS & GYNECOLOGY

## 2023-06-01 PROCEDURE — 2500000003 HC RX 250 WO HCPCS: Performed by: NURSE ANESTHETIST, CERTIFIED REGISTERED

## 2023-06-01 PROCEDURE — 88307 TISSUE EXAM BY PATHOLOGIST: CPT

## 2023-06-01 PROCEDURE — 86850 RBC ANTIBODY SCREEN: CPT

## 2023-06-01 PROCEDURE — 85018 HEMOGLOBIN: CPT

## 2023-06-01 PROCEDURE — 7100000001 HC PACU RECOVERY - ADDTL 15 MIN: Performed by: OBSTETRICS & GYNECOLOGY

## 2023-06-01 PROCEDURE — 2500000003 HC RX 250 WO HCPCS: Performed by: OBSTETRICS & GYNECOLOGY

## 2023-06-01 PROCEDURE — 2580000003 HC RX 258: Performed by: ANESTHESIOLOGY

## 2023-06-01 PROCEDURE — 2500000003 HC RX 250 WO HCPCS: Performed by: ANESTHESIOLOGY

## 2023-06-01 PROCEDURE — 2709999900 HC NON-CHARGEABLE SUPPLY: Performed by: OBSTETRICS & GYNECOLOGY

## 2023-06-01 PROCEDURE — 7100000000 HC PACU RECOVERY - FIRST 15 MIN: Performed by: OBSTETRICS & GYNECOLOGY

## 2023-06-01 PROCEDURE — 6360000002 HC RX W HCPCS: Performed by: OBSTETRICS & GYNECOLOGY

## 2023-06-01 PROCEDURE — 3700000000 HC ANESTHESIA ATTENDED CARE: Performed by: OBSTETRICS & GYNECOLOGY

## 2023-06-01 RX ORDER — SODIUM CHLORIDE, SODIUM LACTATE, POTASSIUM CHLORIDE, AND CALCIUM CHLORIDE .6; .31; .03; .02 G/100ML; G/100ML; G/100ML; G/100ML
IRRIGANT IRRIGATION PRN
Status: DISCONTINUED | OUTPATIENT
Start: 2023-06-01 | End: 2023-06-01 | Stop reason: HOSPADM

## 2023-06-01 RX ORDER — ONDANSETRON 2 MG/ML
INJECTION INTRAMUSCULAR; INTRAVENOUS PRN
Status: DISCONTINUED | OUTPATIENT
Start: 2023-06-01 | End: 2023-06-01 | Stop reason: SDUPTHER

## 2023-06-01 RX ORDER — SUCCINYLCHOLINE/SOD CL,ISO/PF 200MG/10ML
SYRINGE (ML) INTRAVENOUS PRN
Status: DISCONTINUED | OUTPATIENT
Start: 2023-06-01 | End: 2023-06-01 | Stop reason: SDUPTHER

## 2023-06-01 RX ORDER — BUPRENORPHINE AND NALOXONE 8; 2 MG/1; MG/1
1 FILM, SOLUBLE BUCCAL; SUBLINGUAL 2 TIMES DAILY
Status: ON HOLD | COMMUNITY
End: 2023-06-01 | Stop reason: HOSPADM

## 2023-06-01 RX ORDER — FENTANYL CITRATE 50 UG/ML
INJECTION, SOLUTION INTRAMUSCULAR; INTRAVENOUS PRN
Status: DISCONTINUED | OUTPATIENT
Start: 2023-06-01 | End: 2023-06-01 | Stop reason: SDUPTHER

## 2023-06-01 RX ORDER — LABETALOL HYDROCHLORIDE 5 MG/ML
10 INJECTION, SOLUTION INTRAVENOUS
Status: DISCONTINUED | OUTPATIENT
Start: 2023-06-01 | End: 2023-06-01 | Stop reason: HOSPADM

## 2023-06-01 RX ORDER — HYDROMORPHONE HCL 110MG/55ML
PATIENT CONTROLLED ANALGESIA SYRINGE INTRAVENOUS PRN
Status: DISCONTINUED | OUTPATIENT
Start: 2023-06-01 | End: 2023-06-01 | Stop reason: SDUPTHER

## 2023-06-01 RX ORDER — BUPRENORPHINE 2 MG/1
2 TABLET SUBLINGUAL 3 TIMES DAILY
Qty: 180 TABLET | COMMUNITY
Start: 2023-06-01

## 2023-06-01 RX ORDER — MIDAZOLAM HYDROCHLORIDE 1 MG/ML
INJECTION INTRAMUSCULAR; INTRAVENOUS PRN
Status: DISCONTINUED | OUTPATIENT
Start: 2023-06-01 | End: 2023-06-01 | Stop reason: SDUPTHER

## 2023-06-01 RX ORDER — SODIUM CHLORIDE 0.9 % (FLUSH) 0.9 %
5-40 SYRINGE (ML) INJECTION EVERY 12 HOURS SCHEDULED
Status: DISCONTINUED | OUTPATIENT
Start: 2023-06-01 | End: 2023-06-01 | Stop reason: HOSPADM

## 2023-06-01 RX ORDER — KETOROLAC TROMETHAMINE 30 MG/ML
INJECTION, SOLUTION INTRAMUSCULAR; INTRAVENOUS PRN
Status: DISCONTINUED | OUTPATIENT
Start: 2023-06-01 | End: 2023-06-01 | Stop reason: SDUPTHER

## 2023-06-01 RX ORDER — PROPOFOL 10 MG/ML
INJECTION, EMULSION INTRAVENOUS PRN
Status: DISCONTINUED | OUTPATIENT
Start: 2023-06-01 | End: 2023-06-01 | Stop reason: SDUPTHER

## 2023-06-01 RX ORDER — DEXAMETHASONE SODIUM PHOSPHATE 4 MG/ML
INJECTION, SOLUTION INTRA-ARTICULAR; INTRALESIONAL; INTRAMUSCULAR; INTRAVENOUS; SOFT TISSUE PRN
Status: DISCONTINUED | OUTPATIENT
Start: 2023-06-01 | End: 2023-06-01 | Stop reason: SDUPTHER

## 2023-06-01 RX ORDER — BUPIVACAINE HYDROCHLORIDE 5 MG/ML
INJECTION, SOLUTION EPIDURAL; INTRACAUDAL PRN
Status: DISCONTINUED | OUTPATIENT
Start: 2023-06-01 | End: 2023-06-01 | Stop reason: HOSPADM

## 2023-06-01 RX ORDER — SODIUM CHLORIDE 0.9 % (FLUSH) 0.9 %
5-40 SYRINGE (ML) INJECTION PRN
Status: DISCONTINUED | OUTPATIENT
Start: 2023-06-01 | End: 2023-06-01 | Stop reason: HOSPADM

## 2023-06-01 RX ORDER — SODIUM CHLORIDE 9 MG/ML
25 INJECTION, SOLUTION INTRAVENOUS PRN
Status: DISCONTINUED | OUTPATIENT
Start: 2023-06-01 | End: 2023-06-01 | Stop reason: HOSPADM

## 2023-06-01 RX ORDER — MIDAZOLAM HYDROCHLORIDE 1 MG/ML
2 INJECTION INTRAMUSCULAR; INTRAVENOUS
Status: DISCONTINUED | OUTPATIENT
Start: 2023-06-01 | End: 2023-06-01 | Stop reason: HOSPADM

## 2023-06-01 RX ORDER — SODIUM CHLORIDE, SODIUM LACTATE, POTASSIUM CHLORIDE, CALCIUM CHLORIDE 600; 310; 30; 20 MG/100ML; MG/100ML; MG/100ML; MG/100ML
INJECTION, SOLUTION INTRAVENOUS CONTINUOUS
Status: DISCONTINUED | OUTPATIENT
Start: 2023-06-01 | End: 2023-06-01 | Stop reason: HOSPADM

## 2023-06-01 RX ORDER — ONDANSETRON 2 MG/ML
4 INJECTION INTRAMUSCULAR; INTRAVENOUS
Status: DISCONTINUED | OUTPATIENT
Start: 2023-06-01 | End: 2023-06-01 | Stop reason: HOSPADM

## 2023-06-01 RX ORDER — GLYCOPYRROLATE 0.2 MG/ML
INJECTION INTRAMUSCULAR; INTRAVENOUS PRN
Status: DISCONTINUED | OUTPATIENT
Start: 2023-06-01 | End: 2023-06-01 | Stop reason: SDUPTHER

## 2023-06-01 RX ORDER — FENTANYL CITRATE 50 UG/ML
INJECTION, SOLUTION INTRAMUSCULAR; INTRAVENOUS
Status: DISCONTINUED
Start: 2023-06-01 | End: 2023-06-01 | Stop reason: HOSPADM

## 2023-06-01 RX ORDER — ROCURONIUM BROMIDE 10 MG/ML
INJECTION, SOLUTION INTRAVENOUS PRN
Status: DISCONTINUED | OUTPATIENT
Start: 2023-06-01 | End: 2023-06-01 | Stop reason: SDUPTHER

## 2023-06-01 RX ORDER — LIDOCAINE HYDROCHLORIDE 20 MG/ML
INJECTION, SOLUTION INTRAVENOUS PRN
Status: DISCONTINUED | OUTPATIENT
Start: 2023-06-01 | End: 2023-06-01 | Stop reason: SDUPTHER

## 2023-06-01 RX ORDER — GABAPENTIN 300 MG/1
300 CAPSULE ORAL 3 TIMES DAILY
Qty: 9 CAPSULE | Refills: 0 | Status: SHIPPED | OUTPATIENT
Start: 2023-06-01 | End: 2023-06-04

## 2023-06-01 RX ORDER — MAGNESIUM HYDROXIDE 1200 MG/15ML
LIQUID ORAL PRN
Status: DISCONTINUED | OUTPATIENT
Start: 2023-06-01 | End: 2023-06-01 | Stop reason: HOSPADM

## 2023-06-01 RX ORDER — HYDROMORPHONE HYDROCHLORIDE 1 MG/ML
0.25 INJECTION, SOLUTION INTRAMUSCULAR; INTRAVENOUS; SUBCUTANEOUS EVERY 5 MIN PRN
Status: DISCONTINUED | OUTPATIENT
Start: 2023-06-01 | End: 2023-06-01 | Stop reason: HOSPADM

## 2023-06-01 RX ORDER — METOCLOPRAMIDE HYDROCHLORIDE 5 MG/ML
10 INJECTION INTRAMUSCULAR; INTRAVENOUS
Status: DISCONTINUED | OUTPATIENT
Start: 2023-06-01 | End: 2023-06-01 | Stop reason: HOSPADM

## 2023-06-01 RX ORDER — OXYCODONE HYDROCHLORIDE 5 MG/1
5 TABLET ORAL EVERY 6 HOURS PRN
Qty: 20 TABLET | Refills: 0 | Status: SHIPPED | OUTPATIENT
Start: 2023-06-01 | End: 2023-06-06

## 2023-06-01 RX ORDER — BUPRENORPHINE HYDROCHLORIDE AND NALOXONE HYDROCHLORIDE DIHYDRATE 2; .5 MG/1; MG/1
4 TABLET SUBLINGUAL ONCE
Status: COMPLETED | OUTPATIENT
Start: 2023-06-01 | End: 2023-06-01

## 2023-06-01 RX ORDER — FENTANYL CITRATE 50 UG/ML
50 INJECTION, SOLUTION INTRAMUSCULAR; INTRAVENOUS EVERY 5 MIN PRN
Status: COMPLETED | OUTPATIENT
Start: 2023-06-01 | End: 2023-06-01

## 2023-06-01 RX ADMIN — ROCURONIUM BROMIDE 20 MG: 10 INJECTION INTRAVENOUS at 08:07

## 2023-06-01 RX ADMIN — ONDANSETRON 4 MG: 2 INJECTION INTRAMUSCULAR; INTRAVENOUS at 09:20

## 2023-06-01 RX ADMIN — ONDANSETRON 4 MG: 2 INJECTION INTRAMUSCULAR; INTRAVENOUS at 07:59

## 2023-06-01 RX ADMIN — KETOROLAC TROMETHAMINE 30 MG: 30 INJECTION, SOLUTION INTRAMUSCULAR at 09:13

## 2023-06-01 RX ADMIN — GLYCOPYRROLATE 0.2 MG: 0.2 INJECTION INTRAMUSCULAR; INTRAVENOUS at 07:58

## 2023-06-01 RX ADMIN — FENTANYL CITRATE 100 MCG: 50 INJECTION, SOLUTION INTRAMUSCULAR; INTRAVENOUS at 07:46

## 2023-06-01 RX ADMIN — ROCURONIUM BROMIDE 10 MG: 10 INJECTION INTRAVENOUS at 07:46

## 2023-06-01 RX ADMIN — MIDAZOLAM HYDROCHLORIDE 2 MG: 2 INJECTION, SOLUTION INTRAMUSCULAR; INTRAVENOUS at 07:39

## 2023-06-01 RX ADMIN — FENTANYL CITRATE 50 MCG: 50 INJECTION, SOLUTION INTRAMUSCULAR; INTRAVENOUS at 09:51

## 2023-06-01 RX ADMIN — SODIUM CHLORIDE, POTASSIUM CHLORIDE, SODIUM LACTATE AND CALCIUM CHLORIDE: 600; 310; 30; 20 INJECTION, SOLUTION INTRAVENOUS at 06:15

## 2023-06-01 RX ADMIN — CEFAZOLIN 2000 MG: 2 INJECTION, POWDER, FOR SOLUTION INTRAMUSCULAR; INTRAVENOUS at 07:54

## 2023-06-01 RX ADMIN — SODIUM CHLORIDE, POTASSIUM CHLORIDE, SODIUM LACTATE AND CALCIUM CHLORIDE: 600; 310; 30; 20 INJECTION, SOLUTION INTRAVENOUS at 06:00

## 2023-06-01 RX ADMIN — SUGAMMADEX 200 MG: 100 INJECTION, SOLUTION INTRAVENOUS at 09:19

## 2023-06-01 RX ADMIN — FENTANYL CITRATE 50 MCG: 50 INJECTION, SOLUTION INTRAMUSCULAR; INTRAVENOUS at 10:03

## 2023-06-01 RX ADMIN — METRONIDAZOLE 1000 MG: 500 INJECTION, SOLUTION INTRAVENOUS at 07:59

## 2023-06-01 RX ADMIN — ROCURONIUM BROMIDE 40 MG: 10 INJECTION INTRAVENOUS at 07:57

## 2023-06-01 RX ADMIN — BUPRENORPHINE HYDROCHLORIDE AND NALOXONE HYDROCHLORIDE DIHYDRATE 4 TABLET: 2; .5 TABLET SUBLINGUAL at 07:35

## 2023-06-01 RX ADMIN — PROPOFOL 200 MG: 10 INJECTION, EMULSION INTRAVENOUS at 07:46

## 2023-06-01 RX ADMIN — HYDROMORPHONE HYDROCHLORIDE 1 MG: 2 INJECTION, SOLUTION INTRAMUSCULAR; INTRAVENOUS; SUBCUTANEOUS at 09:06

## 2023-06-01 RX ADMIN — LIDOCAINE HYDROCHLORIDE 60 MG: 20 INJECTION, SOLUTION INTRAVENOUS at 07:46

## 2023-06-01 RX ADMIN — METHOCARBAMOL 1000 MG: 100 INJECTION, SOLUTION INTRAMUSCULAR; INTRAVENOUS at 11:04

## 2023-06-01 RX ADMIN — DEXAMETHASONE SODIUM PHOSPHATE 4 MG: 4 INJECTION, SOLUTION INTRAMUSCULAR; INTRAVENOUS at 07:58

## 2023-06-01 RX ADMIN — HYDROMORPHONE HYDROCHLORIDE 0.25 MG: 1 INJECTION, SOLUTION INTRAMUSCULAR; INTRAVENOUS; SUBCUTANEOUS at 10:45

## 2023-06-01 RX ADMIN — Medication 140 MG: at 07:46

## 2023-06-01 RX ADMIN — ROCURONIUM BROMIDE 10 MG: 10 INJECTION INTRAVENOUS at 08:44

## 2023-06-01 ASSESSMENT — PAIN DESCRIPTION - PAIN TYPE
TYPE: SURGICAL PAIN

## 2023-06-01 ASSESSMENT — PAIN DESCRIPTION - DESCRIPTORS
DESCRIPTORS: SHARP
DESCRIPTORS: SHARP
DESCRIPTORS: BURNING
DESCRIPTORS: SHARP
DESCRIPTORS: CRAMPING

## 2023-06-01 ASSESSMENT — PAIN DESCRIPTION - LOCATION
LOCATION: ABDOMEN
LOCATION: VAGINA
LOCATION: ABDOMEN

## 2023-06-01 ASSESSMENT — PAIN DESCRIPTION - ORIENTATION
ORIENTATION: LOWER
ORIENTATION: INNER
ORIENTATION: LOWER
ORIENTATION: LOWER
ORIENTATION: MID

## 2023-06-01 ASSESSMENT — PAIN SCALES - GENERAL
PAINLEVEL_OUTOF10: 10
PAINLEVEL_OUTOF10: 6
PAINLEVEL_OUTOF10: 8
PAINLEVEL_OUTOF10: 0
PAINLEVEL_OUTOF10: 4
PAINLEVEL_OUTOF10: 2

## 2023-06-01 ASSESSMENT — PAIN - FUNCTIONAL ASSESSMENT
PAIN_FUNCTIONAL_ASSESSMENT: PREVENTS OR INTERFERES SOME ACTIVE ACTIVITIES AND ADLS
PAIN_FUNCTIONAL_ASSESSMENT: PREVENTS OR INTERFERES SOME ACTIVE ACTIVITIES AND ADLS
PAIN_FUNCTIONAL_ASSESSMENT: ACTIVITIES ARE NOT PREVENTED
PAIN_FUNCTIONAL_ASSESSMENT: 0-10
PAIN_FUNCTIONAL_ASSESSMENT: ACTIVITIES ARE NOT PREVENTED

## 2023-06-01 ASSESSMENT — PAIN DESCRIPTION - FREQUENCY: FREQUENCY: CONTINUOUS

## 2023-06-01 ASSESSMENT — PAIN DESCRIPTION - ONSET: ONSET: ON-GOING

## 2023-06-01 NOTE — ANESTHESIA PRE PROCEDURE
years: 10.00     Types: Cigarettes    Smokeless tobacco: Never   Vaping Use    Vaping Use: Some days   Substance Use Topics    Alcohol use: Not Currently     Comment: quit a year ago    Drug use: Not Currently     Types: Cocaine, Marijuana (Marlise Ast), Opiates      Comment: snorts      Medications  Current Facility-Administered Medications on File Prior to Visit   Medication Dose Route Frequency Provider Last Rate Last Admin    lactated ringers IV soln infusion   IntraVENous Continuous Derrell Sosa MD        lactated ringers IV soln infusion   IntraVENous Continuous Otoniel Stone  mL/hr at 06/01/23 0600 New Bag at 06/01/23 0600    sodium chloride flush 0.9 % injection 5-40 mL  5-40 mL IntraVENous 2 times per day Otoniel Stone MD        sodium chloride flush 0.9 % injection 5-40 mL  5-40 mL IntraVENous PRN Otoniel Stone MD        metroNIDAZOLE (FLAGYL) 1,000 mg IVPB  1,000 mg IntraVENous Once Otoniel Stone MD        And    ceFAZolin (ANCEF) 2,000 mg in sodium chloride 0.9 % 50 mL IVPB (mini-bag)  2,000 mg IntraVENous Once Otoniel Stone MD        lactated ringers IV soln infusion   IntraVENous Continuous Otoniel Stone  mL/hr at 06/01/23 0615 New Bag at 06/01/23 0615     Current Outpatient Medications on File Prior to Visit   Medication Sig Dispense Refill    busPIRone (BUSPAR) 10 MG tablet 1 tablet in the morning and at bedtime      cetirizine (ZYRTEC) 10 MG tablet 1 tablet daily      DULoxetine (CYMBALTA) 30 MG extended release capsule Take 3 capsules by mouth Daily with lunch      zolpidem (AMBIEN) 5 MG tablet Take 1 tablet by mouth nightly as needed for Sleep.       hydroCHLOROthiazide (HYDRODIURIL) 25 MG tablet Take 1 tablet by mouth daily      Ascorbic Acid (VITAMIN C) 250 MG tablet Take 1 tablet by mouth daily      b complex vitamins capsule Take 1 capsule by mouth daily      metFORMIN (GLUCOPHAGE) 500 MG

## 2023-06-01 NOTE — PROGRESS NOTES
Ambulatory Surgery/Procedure Discharge Note    Vitals:    06/01/23 1202   BP: 131/80   Pulse: 54   Resp: 14   Temp: 98.1 °F (36.7 °C)   SpO2: 100%       In: 2763 [P.O.:120; I.V.:2643]  Out: 315 [Urine:300]    Restroom use offered before discharge. Yes    Pain assessment:  present - adequately treated  Pain Level: 2    Patient states pain is tolerable for discharge. Incision sites are covered with steri-strips. Patient has on viry-pad and mesh panties. Patient tolerating all PO intake. Patient offered to void before discharge. Discharge instructions given to patient and patients daughter. Patient is ready for discharge. Patient discharged to home/self care.  Patient discharged via wheel chair by transporter to waiting family/S.O.       6/1/2023 12:56 PM

## 2023-06-01 NOTE — PROGRESS NOTES
PACU Transfer to [facility]    Vitals:    06/01/23 1143   BP: 127/81   Pulse: 56   Resp: (!) 9   Temp: 97 °F (36.1 °C)   SpO2: 94%     Uses CPAP at home. Instructed to use it today - not just tonight. Intake/Output Summary (Last 24 hours) at 6/1/2023 1148  Last data filed at 6/1/2023 1130  Gross per 24 hour   Intake 1693 ml   Output 315 ml   Net 1378 ml     Tried to use bedpan - nothing out.  Had otilia in OR  Pain assessment:  present - adequately treated  Pain Level: 4 (3-4)    Patient transferred to care of [facility] RN.    6/1/2023 11:48 AM

## 2023-06-01 NOTE — PROGRESS NOTES
Pt admitted to PACU 10 from OR post ROBOTIC TOTAL LAPAROSCOPIC HYSTERECTOMY WITH BILATERAL SALPINGECTOMY per Dr. Andreas Olmos. PACU monitoring devices in place. Report received at bedside from CRNA, no intraoperative complications.  No signs of pain on arrival.

## 2023-06-01 NOTE — ANESTHESIA POSTPROCEDURE EVALUATION
Department of Anesthesiology  Postprocedure Note    Patient: Teresa Bales  MRN: [id number]  YOB: 1975  Date of evaluation: 6/1/2023      Procedure Summary     Date: 06/01/23 Room / Location: [address] [place] [address]    Anesthesia Start: 9373 Anesthesia Stop: 0452    Procedure: ROBOTIC TOTAL LAPAROSCOPIC HYSTERECTOMY WITH BILATERAL SALPINGECTOMY (Bilateral: Abdomen/Perineum) Diagnosis:       Uterine hypertrophy      Intramural leiomyoma of uterus      Dysmenorrhea      Abnormal uterine bleeding      History of endometrial ablation      (Uterine hypertrophy [N85.2])      (Intramural leiomyoma of uterus [D25.1])      (Dysmenorrhea [N94.6])      (Abnormal uterine bleeding [N93.9])      (History of endometrial ablation [Z98.890])    Surgeons: Jorge Orozco MD Responsible Provider: Bessie Espinosa MD    Anesthesia Type: general ASA Status: 3          Anesthesia Type: No value filed.     Faustino Phase I: Faustino Score: 9    Faustino Phase II: Faustino Score: 10      Anesthesia Post Evaluation    Patient location during evaluation: PACU  Level of consciousness: awake and alert  Airway patency: patent  Nausea & Vomiting: no vomiting  Complications: no  Cardiovascular status: blood pressure returned to baseline  Respiratory status: acceptable  Hydration status: euvolemic  Multimodal analgesia pain management approach

## 2023-06-01 NOTE — PROGRESS NOTES
1003 Reports pain when awake. 50 Fentanyl given. Warm blanket to abd and over body. Called Dr Dong Press for permission to apply binder for comfort. Takes Suboxone. Called Dr Gene Nunn - reports narcotics will still help. Robaxin ordered.

## 2023-06-09 ENCOUNTER — HOSPITAL ENCOUNTER (EMERGENCY)
Age: 48
Discharge: HOME | End: 2023-06-09
Attending: EMERGENCY MEDICINE
Payer: COMMERCIAL

## 2023-06-09 ENCOUNTER — TELEPHONE (OUTPATIENT)
Dept: GYNECOLOGY | Age: 48
End: 2023-06-09

## 2023-06-09 VITALS
SYSTOLIC BLOOD PRESSURE: 102 MMHG | BODY MASS INDEX: 37.02 KG/M2 | RESPIRATION RATE: 14 BRPM | HEART RATE: 46 BPM | TEMPERATURE: 98.3 F | WEIGHT: 254.3 LBS | DIASTOLIC BLOOD PRESSURE: 89 MMHG | OXYGEN SATURATION: 100 %

## 2023-06-09 DIAGNOSIS — G89.18 POST-OP PAIN: Primary | ICD-10-CM

## 2023-06-09 LAB
ALBUMIN SERPL-MCNC: 3.9 G/DL (ref 3.4–5)
ALP SERPL-CCNC: 95 U/L (ref 40–129)
ALT SERPL-CCNC: 10 U/L (ref 10–40)
ANION GAP SERPL CALCULATED.3IONS-SCNC: 8 MMOL/L (ref 3–16)
AST SERPL-CCNC: 19 U/L (ref 15–37)
BACTERIA URNS QL MICRO: ABNORMAL /HPF
BASOPHILS # BLD: 0.1 K/UL (ref 0–0.2)
BASOPHILS NFR BLD: 0.8 %
BILIRUB DIRECT SERPL-MCNC: <0.2 MG/DL (ref 0–0.3)
BILIRUB INDIRECT SERPL-MCNC: NORMAL MG/DL (ref 0–1)
BILIRUB SERPL-MCNC: <0.2 MG/DL (ref 0–1)
BILIRUB UR QL STRIP.AUTO: NEGATIVE
BUN SERPL-MCNC: 12 MG/DL (ref 7–20)
CALCIUM SERPL-MCNC: 9.3 MG/DL (ref 8.3–10.6)
CHLORIDE SERPL-SCNC: 104 MMOL/L (ref 99–110)
CLARITY UR: CLEAR
CO2 SERPL-SCNC: 29 MMOL/L (ref 21–32)
COLOR UR: YELLOW
CREAT SERPL-MCNC: 1.1 MG/DL (ref 0.6–1.1)
DEPRECATED RDW RBC AUTO: 13.4 % (ref 12.4–15.4)
EOSINOPHIL # BLD: 0.9 K/UL (ref 0–0.6)
EOSINOPHIL NFR BLD: 9.4 %
EPI CELLS #/AREA URNS HPF: ABNORMAL /HPF (ref 0–5)
GFR SERPLBLD CREATININE-BSD FMLA CKD-EPI: >60 ML/MIN/{1.73_M2}
GLUCOSE SERPL-MCNC: 93 MG/DL (ref 70–99)
GLUCOSE UR STRIP.AUTO-MCNC: NEGATIVE MG/DL
HCT VFR BLD AUTO: 36.4 % (ref 36–48)
HGB BLD-MCNC: 12.5 G/DL (ref 12–16)
HGB UR QL STRIP.AUTO: NEGATIVE
KETONES UR STRIP.AUTO-MCNC: NEGATIVE MG/DL
LEUKOCYTE ESTERASE UR QL STRIP.AUTO: NEGATIVE
LIPASE SERPL-CCNC: 23 U/L (ref 13–60)
LYMPHOCYTES # BLD: 3.5 K/UL (ref 1–5.1)
LYMPHOCYTES NFR BLD: 35 %
MCH RBC QN AUTO: 31.6 PG (ref 26–34)
MCHC RBC AUTO-ENTMCNC: 34.3 G/DL (ref 31–36)
MCV RBC AUTO: 92 FL (ref 80–100)
MONOCYTES # BLD: 0.8 K/UL (ref 0–1.3)
MONOCYTES NFR BLD: 7.9 %
NEUTROPHILS # BLD: 4.7 K/UL (ref 1.7–7.7)
NEUTROPHILS NFR BLD: 46.9 %
NITRITE UR QL STRIP.AUTO: NEGATIVE
PH UR STRIP.AUTO: 6 [PH] (ref 5–8)
PLATELET # BLD AUTO: 251 K/UL (ref 135–450)
PMV BLD AUTO: 9.6 FL (ref 5–10.5)
POTASSIUM SERPL-SCNC: 4.3 MMOL/L (ref 3.5–5.1)
PROT SERPL-MCNC: 7.2 G/DL (ref 6.4–8.2)
PROT UR STRIP.AUTO-MCNC: NEGATIVE MG/DL
RBC # BLD AUTO: 3.96 M/UL (ref 4–5.2)
RBC #/AREA URNS HPF: ABNORMAL /HPF (ref 0–4)
SODIUM SERPL-SCNC: 141 MMOL/L (ref 136–145)
SP GR UR STRIP.AUTO: 1.02 (ref 1–1.03)
UA DIPSTICK W REFLEX MICRO PNL UR: ABNORMAL
URN SPEC COLLECT METH UR: ABNORMAL
UROBILINOGEN UR STRIP-ACNC: 0.2 E.U./DL
WBC # BLD AUTO: 10 K/UL (ref 4–11)
WBC #/AREA URNS HPF: ABNORMAL /HPF (ref 0–5)

## 2023-06-09 PROCEDURE — 80076 HEPATIC FUNCTION PANEL: CPT

## 2023-06-09 PROCEDURE — 6360000002 HC RX W HCPCS: Performed by: PHYSICIAN ASSISTANT

## 2023-06-09 PROCEDURE — 81001 URINALYSIS AUTO W/SCOPE: CPT

## 2023-06-09 PROCEDURE — 80048 BASIC METABOLIC PNL TOTAL CA: CPT

## 2023-06-09 PROCEDURE — 85025 COMPLETE CBC W/AUTO DIFF WBC: CPT

## 2023-06-09 PROCEDURE — 6370000000 HC RX 637 (ALT 250 FOR IP): Performed by: PHYSICIAN ASSISTANT

## 2023-06-09 PROCEDURE — 83690 ASSAY OF LIPASE: CPT

## 2023-06-09 RX ORDER — METRONIDAZOLE 500 MG/1
500 TABLET ORAL ONCE
Status: COMPLETED | OUTPATIENT
Start: 2023-06-09 | End: 2023-06-09

## 2023-06-09 RX ORDER — ONDANSETRON 4 MG/1
4 TABLET, ORALLY DISINTEGRATING ORAL 3 TIMES DAILY PRN
Qty: 12 TABLET | Refills: 0 | Status: SHIPPED | OUTPATIENT
Start: 2023-06-09

## 2023-06-09 RX ORDER — CIPROFLOXACIN 500 MG/1
500 TABLET, FILM COATED ORAL ONCE
Status: COMPLETED | OUTPATIENT
Start: 2023-06-09 | End: 2023-06-09

## 2023-06-09 RX ORDER — CIPROFLOXACIN 500 MG/1
500 TABLET, FILM COATED ORAL 2 TIMES DAILY
Qty: 14 TABLET | Refills: 0 | Status: SHIPPED | OUTPATIENT
Start: 2023-06-09 | End: 2023-06-16

## 2023-06-09 RX ORDER — METRONIDAZOLE 500 MG/1
500 TABLET ORAL 2 TIMES DAILY
Qty: 14 TABLET | Refills: 0 | Status: SHIPPED | OUTPATIENT
Start: 2023-06-09 | End: 2023-06-16

## 2023-06-09 RX ORDER — MORPHINE SULFATE 2 MG/ML
4 INJECTION, SOLUTION INTRAMUSCULAR; INTRAVENOUS ONCE
Status: COMPLETED | OUTPATIENT
Start: 2023-06-09 | End: 2023-06-09

## 2023-06-09 RX ORDER — ONDANSETRON 2 MG/ML
4 INJECTION INTRAMUSCULAR; INTRAVENOUS ONCE
Status: COMPLETED | OUTPATIENT
Start: 2023-06-09 | End: 2023-06-09

## 2023-06-09 RX ORDER — METRONIDAZOLE 500 MG/1
500 TABLET ORAL EVERY 8 HOURS SCHEDULED
Status: DISCONTINUED | OUTPATIENT
Start: 2023-06-09 | End: 2023-06-09

## 2023-06-09 RX ADMIN — CIPROFLOXACIN 500 MG: 500 TABLET, FILM COATED ORAL at 19:29

## 2023-06-09 RX ADMIN — MORPHINE SULFATE 4 MG: 2 INJECTION, SOLUTION INTRAMUSCULAR; INTRAVENOUS at 18:39

## 2023-06-09 RX ADMIN — METRONIDAZOLE 500 MG: 500 TABLET ORAL at 19:29

## 2023-06-09 RX ADMIN — ONDANSETRON 4 MG: 2 INJECTION INTRAMUSCULAR; INTRAVENOUS at 18:38

## 2023-06-09 ASSESSMENT — PAIN DESCRIPTION - LOCATION
LOCATION: ABDOMEN

## 2023-06-09 ASSESSMENT — ENCOUNTER SYMPTOMS
VOMITING: 0
NAUSEA: 1
DIARRHEA: 1
CONSTIPATION: 0
ABDOMINAL PAIN: 1

## 2023-06-09 ASSESSMENT — PAIN SCALES - GENERAL
PAINLEVEL_OUTOF10: 7
PAINLEVEL_OUTOF10: 8
PAINLEVEL_OUTOF10: 4

## 2023-06-09 ASSESSMENT — PAIN - FUNCTIONAL ASSESSMENT: PAIN_FUNCTIONAL_ASSESSMENT: 0-10

## 2023-06-09 ASSESSMENT — LIFESTYLE VARIABLES
HOW OFTEN DO YOU HAVE A DRINK CONTAINING ALCOHOL: NEVER
HOW MANY STANDARD DRINKS CONTAINING ALCOHOL DO YOU HAVE ON A TYPICAL DAY: PATIENT DOES NOT DRINK

## 2023-06-09 ASSESSMENT — PAIN DESCRIPTION - ORIENTATION
ORIENTATION: LEFT
ORIENTATION: LEFT
ORIENTATION: LOWER

## 2023-06-09 ASSESSMENT — PAIN DESCRIPTION - DESCRIPTORS
DESCRIPTORS: DISCOMFORT
DESCRIPTORS: DISCOMFORT;DULL

## 2023-06-09 NOTE — ED PROVIDER NOTES
ED Attending Attestation Note     Date of evaluation: 6/9/2023    This patient was seen by the advance practice provider. I have seen and examined the patient, agree with the workup, evaluation, management and diagnosis. The care plan has been discussed. My assessment reveals alert patient laying in bed, not vomiting    Complaint--abdominal pain with bowel movements    HPI--patient recently had laparoscopic hysterectomy, notes having some discomfort with bowel movements and urination. No fevers or chills. Some mild discomfort on palpation but no rebound or guarding on examination. PMH--recent laparoscopic hysterectomy.      Del De Leon MD  06/09/23 8869
mouth daily    LANSOPRAZOLE (PREVACID) 30 MG DELAYED RELEASE CAPSULE    Take 1 capsule by mouth in the morning and at bedtime    METFORMIN (GLUCOPHAGE) 500 MG TABLET    Take 1 tablet by mouth 2 times daily (with meals)    SUCRALFATE (CARAFATE) 1 GM/10ML SUSPENSION    Take 10 mLs by mouth 4 times daily (before meals and nightly) for 10 days       Allergies     She is allergic to ace inhibitors, ibuprofen, pcn [penicillins], and pork (porcine) protein. Physical Exam     INITIAL VITALS: BP: (!) 165/97, Temp: 98.3 °F (36.8 °C), Pulse: 56, Respirations: 12, SpO2: 100 %  Physical Exam  Constitutional:       General: She is not in acute distress. Appearance: She is well-developed and normal weight. She is not ill-appearing, toxic-appearing or diaphoretic. HENT:      Head: Normocephalic and atraumatic. Pulmonary:      Effort: Pulmonary effort is normal.      Breath sounds: Normal breath sounds. Abdominal:      General: Abdomen is flat. There is no distension. Tenderness: There is abdominal tenderness (suprapubic and LLQ). There is no guarding or rebound. Comments: Laparoscopic incisions well healing    Skin:     General: Skin is warm. Neurological:      Mental Status: She is alert.            Thomas Hendrickson PA-C  06/09/23 1936

## 2023-06-09 NOTE — TELEPHONE ENCOUNTER
Complains of pressure with urination. Complains of loose stools. Complains of stomach ache with bowel movement. Feels like she is developing a knot that is becoming more tender on the mid incision. Complains of some vaginal odor. She reports symptoms of been present for about 2 days now and decided to call now because she was becoming concerned. Advised that this should be evaluated. Need to check for bladder infection. You may need to check for wound infection. Need to evaluate abdominal discomfort and diarrhea as well as a vaginal odor.     I told the patient I do not think she should wait till Monday to be evaluated and she is advised to go to [facility] with the ER physician at [facility] [employer] notified that we are sending the patient in

## 2023-06-09 NOTE — TELEPHONE ENCOUNTER
Pt had surgery 06/01/2023    Pt having a lot of pressure, epically when she uses the restroom. She denies having problems with using the restroom but is having diarrhea. Having cramping and pain. \"Feels like some one turning a knife in there. \" She is feeling nausea from the pain. Feels like she isn't keeping anything in her stomach. Has a hard knot on the middle incision. Just above she has a hernia so she is worried about that. Pt is also having abnormal discharge with order. She denies any bleeding.

## 2023-06-09 NOTE — TELEPHONE ENCOUNTER
Patient is having surgery complication and would like to discuss this with someone.  Please advise      Patient can be reached at [phone]

## 2023-06-29 ENCOUNTER — TELEPHONE (OUTPATIENT)
Dept: GYNECOLOGY | Age: 48
End: 2023-06-29

## 2023-06-30 RX ORDER — ONDANSETRON 4 MG/1
4 TABLET, ORALLY DISINTEGRATING ORAL 3 TIMES DAILY PRN
Qty: 21 TABLET | Refills: 0 | Status: SHIPPED | OUTPATIENT
Start: 2023-06-30

## 2023-12-09 ENCOUNTER — APPOINTMENT (OUTPATIENT)
Dept: GENERAL RADIOLOGY | Age: 48
End: 2023-12-09
Payer: COMMERCIAL

## 2023-12-09 ENCOUNTER — HOSPITAL ENCOUNTER (EMERGENCY)
Age: 48
Discharge: HOME | End: 2023-12-09
Payer: COMMERCIAL

## 2023-12-09 VITALS
TEMPERATURE: 98.1 F | RESPIRATION RATE: 16 BRPM | HEART RATE: 59 BPM | BODY MASS INDEX: 37.55 KG/M2 | WEIGHT: 253.53 LBS | SYSTOLIC BLOOD PRESSURE: 143 MMHG | OXYGEN SATURATION: 99 % | DIASTOLIC BLOOD PRESSURE: 86 MMHG | HEIGHT: 69 IN
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
## 2023-12-09 DIAGNOSIS — S59.912A FOREARM INJURY, LEFT, INITIAL ENCOUNTER: Primary | ICD-10-CM

## 2023-12-09 PROCEDURE — 73110 X-RAY EXAM OF WRIST: CPT

## 2023-12-09 PROCEDURE — 73130 X-RAY EXAM OF HAND: CPT

## 2023-12-09 PROCEDURE — 99283 EMERGENCY DEPT VISIT LOW MDM: CPT

## 2023-12-09 PROCEDURE — 6370000000 HC RX 637 (ALT 250 FOR IP)

## 2023-12-09 PROCEDURE — 29125 APPL SHORT ARM SPLINT STATIC: CPT

## 2023-12-09 RX ORDER — OXYCODONE HYDROCHLORIDE AND ACETAMINOPHEN 5; 325 MG/1; MG/1
1 TABLET ORAL ONCE
Status: COMPLETED | OUTPATIENT
Start: 2023-12-09 | End: 2023-12-09

## 2023-12-09 RX ORDER — LIDOCAINE 50 MG/G
1 PATCH TOPICAL DAILY
Qty: 10 PATCH | Refills: 0 | Status: SHIPPED | OUTPATIENT
Start: 2023-12-09 | End: 2023-12-19

## 2023-12-09 RX ORDER — TIZANIDINE 4 MG/1
4 TABLET ORAL EVERY 6 HOURS PRN
Qty: 28 TABLET | Refills: 0 | Status: SHIPPED | OUTPATIENT
Start: 2023-12-09 | End: 2023-12-16

## 2023-12-09 RX ADMIN — OXYCODONE AND ACETAMINOPHEN 1 TABLET: 5; 325 TABLET ORAL at 21:45

## 2023-12-09 ASSESSMENT — PAIN DESCRIPTION - ORIENTATION: ORIENTATION: LEFT

## 2023-12-09 ASSESSMENT — PAIN SCALES - GENERAL
PAINLEVEL_OUTOF10: 9
PAINLEVEL_OUTOF10: 9

## 2023-12-09 ASSESSMENT — PAIN - FUNCTIONAL ASSESSMENT: PAIN_FUNCTIONAL_ASSESSMENT: 0-10

## 2023-12-09 ASSESSMENT — PAIN DESCRIPTION - LOCATION: LOCATION: WRIST

## 2023-12-09 ASSESSMENT — PAIN DESCRIPTION - PAIN TYPE: TYPE: ACUTE PAIN
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
## 2023-12-28 ENCOUNTER — TELEPHONE (OUTPATIENT)
Dept: GYNECOLOGY | Age: 48
End: 2023-12-28

## 2023-12-28 NOTE — TELEPHONE ENCOUNTER
Recived a call from Lori at the hospital saying that they are missing the Medicaid consent form from her surgery. I also took a look and was unable to find it. I let Lori know that Katarina had handled this case and that I would check her old desk for the form as no one sits there yet.   Lori says if we are unable to find the form it should be okay to post-date based off documented conversations. I was unable to locate the form.  ________    Called Karyn to ask her permission to send her a form for her to sign. Had to leave a message for her to call back.

## 2024-01-10 NOTE — TELEPHONE ENCOUNTER
Patient had me read off her [employer] Username and change her [employer] password so that she may sign the form. She is able to log in to her account but she is unable to sign the form. She request that I just mail it to her instead. Form mailed.

## 2024-05-10 NOTE — ANESTHESIA PRE PROCEDURE
[facility] Department of Anesthesiology  Pre-Anesthesia Evaluation/Consultation       Name:  Karine Quintero  : 1975  Age:  54 y.o. MRN:  [id number]  Date: 2021           Surgeon: Surgeon(s):  Gil Santacruz MD    Procedure: Procedure(s):  EGD DIAGNOSTIC ONLY     Allergies   Allergen Reactions    Ace Inhibitors Anaphylaxis     \"Throat and Mouth Swelled UP\"  Angioedema   Angioedema       Ibuprofen      Stomach ulcers    Pcn [Penicillins] Itching    Pork (Porcine) Protein      Restoration     Patient Active Problem List   Diagnosis    Alcohol withdrawal syndrome with complication ([address]    Suicidal ideation    Pyelonephritis     Past Medical History:   Diagnosis Date    Gastric ulcer     Hypertension     Suicidal ideation      Past Surgical History:   Procedure Laterality Date     SECTION      ENDOMETRIAL ABLATION      GASTRIC BYPASS SURGERY       Social History     Tobacco Use    Smoking status: Current Every Day Smoker     Packs/day: 0.50     Years: 20.00     Pack years: 10.00     Types: Cigarettes    Smokeless tobacco: Never Used   Substance Use Topics    Alcohol use: Yes     Comment: 5th a day     Drug use: Yes     Types: Cocaine, Marijuana, Opiates      Comment: snorts      Medications  No current facility-administered medications on file prior to encounter. Current Outpatient Medications on File Prior to Encounter   Medication Sig Dispense Refill    buprenorphine-naloxone (SUBOXONE) 8-2 MG SUBL SL tablet 1 tablet daily.        gabapentin (NEURONTIN) 300 MG capsule       hydroCHLOROthiazide (HYDRODIURIL) 12.5 MG tablet       lansoprazole (PREVACID) 30 MG capsule Take 30 mg by mouth daily      ibuprofen (ADVIL;MOTRIN) 600 MG tablet Take 1 tablet by mouth every 6 hours as needed for Pain 30 tablet 0    acetaminophen (TYLENOL) 325 MG tablet Take 2 tablets by mouth every 6 hours as needed for Pain 30 tablet 0  sucralfate (CARAFATE) 1 GM/10ML suspension Take 10 mLs by mouth 4 times daily (before meals and nightly) for 10 days 400 mL 0    oxyCODONE-acetaminophen (PERCOCET) 5-325 MG per tablet Take 1 tablet by mouth every 8 hours as needed for Pain 10 tablet 0    atenolol (TENORMIN) 100 MG tablet Take 100 mg by mouth daily      hydrOXYzine (VISTARIL) 25 MG capsule Take 25 mg by mouth 2 times daily      sertraline (ZOLOFT) 100 MG tablet Take 100 mg by mouth daily      naltrexone (DEPADE) 50 MG tablet Take 50 mg by mouth daily      spironolactone (ALDACTONE) 50 MG tablet Take 50 mg by mouth daily       Current Facility-Administered Medications   Medication Dose Route Frequency Provider Last Rate Last Admin    levoFLOXacin (LEVAQUIN) 750 MG/150ML infusion 750 mg  750 mg Intravenous Q24H Samra Ahuja MD   Stopped at 01/03/21 1830    sucralfate (CARAFATE) tablet 1 g  1 g Oral 4x Daily AC & HS Eulogio Crandall MD   1 g at 01/03/21 2020    pantoprazole (PROTONIX) tablet 40 mg  40 mg Oral BID AC Eulogio Crandall MD   40 mg at 01/03/21 1636    LORazepam (ATIVAN) injection 1 mg  1 mg Intravenous Q4H PRN Verner Butts, MD   1 mg at 01/04/21 0717    hydrOXYzine (ATARAX) tablet 10 mg  10 mg Oral 4x Daily PRN Verner Butts, MD   10 mg at 01/04/21 1053    escitalopram (LEXAPRO) tablet 10 mg  10 mg Oral Daily Monica Bhardwaj MD   10 mg at 01/03/21 0756    nicotine (NICODERM CQ) 21 MG/24HR 1 patch  1 patch Transdermal Daily JERILYN Correa - CNP   1 patch at 01/04/21 1056    potassium chloride (KLOR-CON M) extended release tablet 40 mEq  40 mEq Oral PRN Verner Butts, MD   40 mEq at 12/29/20 1430    Or    potassium bicarb-citric acid (EFFER-K) effervescent tablet 40 mEq  40 mEq Oral PRN Verner Butts, MD        magnesium sulfate 1 g in dextrose 5% 100 mL IVPB  1 g Intravenous PRN Verner Butts, MD MAP (mmHg)  Av  Min: 89   Min taken time: 21 1458  Max: 119   Max taken time: 21  SpO2  Av.5 %  Min: 94 %   Min taken time: 21 1458  Max: 99 %   Max taken time: 21    BP Readings from Last 3 Encounters:   21 130/89   20 122/71   19 (!) 173/117     BMI  Body mass index is 34.16 kg/m². Estimated body mass index is 34.16 kg/m² as calculated from the following:    Height as of this encounter: 5' 10\" (1.778 m). Weight as of this encounter: 238 lb 1.6 oz (108 kg).     CBC   Lab Results   Component Value Date    WBC 6.3 2021    RBC 4.21 2021    HGB 13.0 2021    HCT 39.4 2021    MCV 93.6 2021    RDW 14.9 2021     2021     CMP    Lab Results   Component Value Date     2021    K 3.9 2021     2021    CO2 20 2021    BUN 13 2021    CREATININE 1.1 2021    GFRAA >60 2021    GFRAA >60 2013    AGRATIO 0.9 2021    LABGLOM 53 2021    GLUCOSE 182 2021    PROT 7.2 2021    PROT 7.5 2013    CALCIUM 9.8 2021    BILITOT <0.2 2021    ALKPHOS 86 2021    AST 16 2021    ALT 9 2021     BMP    Lab Results   Component Value Date     2021    K 3.9 2021     2021    CO2 20 2021    BUN 13 2021    CREATININE 1.1 2021    CALCIUM 9.8 2021    GFRAA >60 2021    GFRAA >60 2013    LABGLOM 53 2021    GLUCOSE 182 2021     POCGlucose  Recent Labs     21  0931   GLUCOSE 182*      Coags    Lab Results   Component Value Date    PROTIME 14.4 2020    INR 1.24      HCG (If Applicable)   Lab Results   Component Value Date    PREGTESTUR Negative 2021      ABGs No results found for: PHART, PO2ART, AGB7GWD, FTG2VPF, BEART, F6XLAXQO   Type & Screen (If Applicable)  No results found for: Gemini Medina BMI: Wt Readings from Last 3 Encounters:       NPO Status:   Date of last liquid consumption: 01/04/21   Time of last liquid consumption: 1100(sips with meds)   Date of last solid food consumption: 01/03/21      Time of last solid consumption: 2300       Anesthesia Evaluation  Patient summary reviewed no history of anesthetic complications:   Airway: Mallampati: III  TM distance: >3 FB   Neck ROM: full   Dental:    (+) partials      Pulmonary:Negative Pulmonary ROS and normal exam                               Cardiovascular:  Exercise tolerance: good (>4 METS),   (+) hypertension:,         Rhythm: regular  Rate: normal           Beta Blocker:  Dose within 24 Hrs         Neuro/Psych:   (+) psychiatric history:            GI/Hepatic/Renal:   (+) GERD:, PUD,           Endo/Other: Negative Endo/Other ROS                     ROS comment: ETOH Abdominal:           Vascular: negative vascular ROS. Anesthesia Plan      MAC     ASA 3       Induction: intravenous. Anesthetic plan and risks discussed with patient. Plan discussed with CRNA. This pre-anesthesia assessment may be used as a history and physical.    DOS STAFF ADDENDUM:    Pt seen and examined, chart reviewed (including anesthesia, drug and allergy history). No interval changes to history and physical examination. Anesthetic plan, risks, benefits, alternatives, and personnel involved discussed with patient. Questions and concerns addressed. Patient(family) verbalized an understanding and agrees to proceed.       Sharyle Skiff, MD  January 4, 2021  12:15 PM pacu 311
